# Patient Record
Sex: MALE | Race: WHITE | Employment: OTHER | URBAN - METROPOLITAN AREA
[De-identification: names, ages, dates, MRNs, and addresses within clinical notes are randomized per-mention and may not be internally consistent; named-entity substitution may affect disease eponyms.]

---

## 2020-08-03 ENCOUNTER — HOSPITAL ENCOUNTER (OUTPATIENT)
Dept: RADIATION ONCOLOGY | Age: 80
Discharge: HOME | End: 2020-08-03
Payer: OTHER GOVERNMENT

## 2020-08-03 VITALS
HEART RATE: 84 BPM | OXYGEN SATURATION: 91 % | DIASTOLIC BLOOD PRESSURE: 68 MMHG | RESPIRATION RATE: 18 BRPM | BODY MASS INDEX: 24.58 KG/M2 | TEMPERATURE: 98.3 F | SYSTOLIC BLOOD PRESSURE: 148 MMHG | WEIGHT: 162.2 LBS | HEIGHT: 68 IN

## 2020-08-03 PROCEDURE — 99204 OFFICE O/P NEW MOD 45 MIN: CPT | Performed by: RADIOLOGY

## 2020-08-03 PROCEDURE — 99202 OFFICE O/P NEW SF 15 MIN: CPT | Performed by: RADIOLOGY

## 2020-08-03 RX ORDER — LOSARTAN POTASSIUM 100 MG/1
100 TABLET ORAL DAILY
Status: ON HOLD | COMMUNITY
End: 2020-10-01 | Stop reason: HOSPADM

## 2020-08-03 RX ORDER — LIDOCAINE 50 MG/G
1 PATCH TOPICAL DAILY
COMMUNITY

## 2020-08-03 RX ORDER — SILDENAFIL 100 MG/1
100 TABLET, FILM COATED ORAL PRN
Status: ON HOLD | COMMUNITY
End: 2020-10-01 | Stop reason: HOSPADM

## 2020-08-03 RX ORDER — SPIRONOLACTONE 25 MG/1
25 TABLET ORAL DAILY
Status: ON HOLD | COMMUNITY
End: 2020-10-01 | Stop reason: HOSPADM

## 2020-08-03 RX ORDER — PRAVASTATIN SODIUM 40 MG
20 TABLET ORAL DAILY
Status: ON HOLD | COMMUNITY
End: 2020-10-01 | Stop reason: HOSPADM

## 2020-08-03 RX ORDER — AMLODIPINE BESYLATE 10 MG/1
10 TABLET ORAL DAILY
Status: ON HOLD | COMMUNITY
End: 2020-10-01 | Stop reason: HOSPADM

## 2020-08-03 RX ORDER — CARVEDILOL 6.25 MG/1
6.25 TABLET ORAL 2 TIMES DAILY WITH MEALS
Status: ON HOLD | COMMUNITY
End: 2020-10-01 | Stop reason: HOSPADM

## 2020-08-03 RX ORDER — METHOCARBAMOL 500 MG/1
1000 TABLET, FILM COATED ORAL 3 TIMES DAILY
Status: ON HOLD | COMMUNITY
End: 2020-10-01 | Stop reason: HOSPADM

## 2020-08-03 NOTE — PROGRESS NOTES
Grady Tien  8/3/2020    Chaperone: No    Advanced Directives     Power of [employer] Will    Not on File Not on File      Patient states he has [address]     Temp Readings from Last 2 Encounters:   08/03/20 98.3 °F (36.8 °C) (Infrared)     BP Readings from Last 2 Encounters:   08/03/20 (!) 148/68     Pulse Readings from Last 2 Encounters:   08/03/20 84        Wt Readings from Last 3 Encounters:   08/03/20 162 lb 3.2 oz (73.6 kg)         No results found for: CREATININE  No results found for: BUN    Mediport: no    Pacemaker/ICD: no    Previous XRT: no    Past Medical History:   Diagnosis Date    Arthritis     CAD (coronary artery disease)     [facility] [address] [facility]     COPD [facility]chronic obstructive [facility] disease) [facility] [address] [facility]     Hyperlipidemia     Hypertension      Past Surgical History:   Procedure Laterality Date    EYE SURGERY      Cataracts removed    JOINT REPLACEMENT      Hip    LUNG BIOPSY      VASCULAR SURGERY      Stents in leg       No Known Allergies       Current Outpatient Medications:     amLODIPine (NORVASC) 10 MG tablet, Take 10 mg by mouth daily, Disp: , Rfl:     apixaban (ELIQUIS) 5 MG TABS tablet, Take by mouth 2 times daily, Disp: , Rfl:     carvedilol (COREG) 6.25 MG tablet, Take 6.25 mg by mouth 2 times daily (with meals), Disp: , Rfl:     diclofenac sodium (VOLTAREN) 1 % GEL, Apply 4 g topically 4 times daily as needed for Pain, Disp: , Rfl:     lidocaine (LIDODERM) 5 %, Place 1 patch onto the skin daily 12 hours on, 12 hours off., Disp: , Rfl:     losartan (COZAAR) 100 MG tablet, Take 100 mg by mouth daily, Disp: , Rfl:     methocarbamol (ROBAXIN) 500 MG tablet, Take 1,000 mg by mouth 3 times daily, Disp: , Rfl:     Multiple Vitamins-Minerals (ICAPS AREDS 2 PO), Take 1 capsule by mouth 2 times daily, Disp: , Rfl:     pravastatin (PRAVACHOL) 40 MG tablet, Take 20 mg by mouth daily, Disp: , Rfl:     sildenafil (VIAGRA) 100 MG tablet, Take 100 mg by mouth as needed for Erectile Dysfunction, Disp: , Rfl:     spironolactone (ALDACTONE) 25 MG tablet, Take 25 mg by mouth daily, Disp: , Rfl:       ADDITIONAL COMMENTS: Patient here for consult.       Que Ramirez BSN, RN

## 2020-08-03 NOTE — PROGRESS NOTES
adjacent to the major fissure. However, there is mild increased uptake in the right upper lung posteriorly laterally though with a relatively low SUV of 1.1. There is mild uptake in a pretracheal lymph node and in the right hilum of 1.9 SUV. Biopsy was recommended, and CT guided biopsy was performed in July 2020. The more superior lesion returned positive for non-small cell carcinoma, favoring adenocarcinoma. The lower lesion showed chronic inflammation and fibrosis. Theresa José is considered a poor surgical candidate. He is being seen today 8/3/2024 evaluation and discussion regarding the role of stereotactic ablative radiation therapy in the management of his biopsy-proven right upper lung cancer. Past Medical History:   Diagnosis Date    Arthritis     CAD (coronary artery disease)     [facility] [address]     COPD (chronic obstructive pulmonary disease) ([facility] [address] [facility]     Hyperlipidemia     Hypertension         Past Surgical History:   Procedure Laterality Date    EYE SURGERY      Cataracts removed    JOINT REPLACEMENT      Hip    LUNG BIOPSY      VASCULAR SURGERY      Stents in leg       Family History   Problem Relation Age of Onset    Liver Disease Mother        Social History     Socioeconomic History    Marital status: Single     Spouse name: Not on file    Number of children: 0    Years of education: Not on file    Highest education level: Not on file   Occupational History    Not on file   Social Needs    Financial resource strain: Not on file    Food insecurity     Worry: Not on file     Inability: Not on file   [employer] needs     Medical: Not on file     Non-medical: Not on file   Tobacco Use    Smoking status: Current Every Day Smoker     Packs/day: 0.50     Years: 68.00     Pack years: 34.00     Types: Cigarettes     Start date: 1/1/1950    Smokeless tobacco: Never Used   Substance and Sexual Activity    Alcohol use:  Yes     Alcohol/week: 3.0 standard drinks     Types: 3 Cans of beer per week    Drug use: Never    Sexual activity: Not on file   Lifestyle    Physical activity     Days per week: Not on file     Minutes per session: Not on file    Stress: Not on file   Relationships    Social connections     Talks on phone: Not on file     Gets together: Not on file     Attends [employer] service: Not on file     Active member of club or organization: Not on file     Attends meetings of clubs or organizations: Not on file     Relationship status: Not on file    Intimate partner violence     Fear of current or ex partner: Not on file     Emotionally abused: Not on file     Physically abused: Not on file     Forced sexual activity: Not on file   Other Topics Concern    Not on file   Social History Narrative    Not on file     Exposure to Industrial/ environmental Carcinogens: uncertain: Cannot rule out toxic exposure during [employer]. ALLERGIES: No Known Allergies       Current Outpatient Medications   Medication Sig Dispense Refill    amLODIPine (NORVASC) 10 MG tablet Take 10 mg by mouth daily      apixaban (ELIQUIS) 5 MG TABS tablet Take by mouth 2 times daily      carvedilol (COREG) 6.25 MG tablet Take 6.25 mg by mouth 2 times daily (with meals)      diclofenac sodium (VOLTAREN) 1 % GEL Apply 4 g topically 4 times daily as needed for Pain      lidocaine (LIDODERM) 5 % Place 1 patch onto the skin daily 12 hours on, 12 hours off.  losartan (COZAAR) 100 MG tablet Take 100 mg by mouth daily      methocarbamol (ROBAXIN) 500 MG tablet Take 1,000 mg by mouth 3 times daily      Multiple Vitamins-Minerals (ICAPS AREDS 2 PO) Take 1 capsule by mouth 2 times daily      pravastatin (PRAVACHOL) 40 MG tablet Take 20 mg by mouth daily      sildenafil (VIAGRA) 100 MG tablet Take 100 mg by mouth as needed for Erectile Dysfunction      spironolactone (ALDACTONE) 25 MG tablet Take 25 mg by mouth daily       No current facility-administered medications for this encounter.       No outpatient medications have been marked as taking for the 8/3/20 encounter [facility] Encounter) with Yung Shell MD.          LABORATORY STUDIES:   No current for review       PATHOLOGY:   7/9/2020: Surgical pathology:  A. Right upper lobe of lung, apical, biopsy:  Non-small cell lung carcinoma, favor adenocarcinoma, poorly differentiated. COMMENT: For imaging of stains are obtained with appropriate controls. The tumor cells staining with TTF-1 and CK 7. Focally rare tumor cells stained with an. Tumor cells do not stain with P 40. Results are discussed with Dr. Javi FALL. Interdepartmental consultation is obtained. B.  Right mid lobe of lung, mass, biopsy:  Chronic inflammation and fibrosis. RADIOLOGIC STUDIES:   As described in history of present illness. 6/4/2020: CT chest lung nodule follow-up; apical nodule, biopsy proven non-small cell lung carcinoma. 6/4/2020: CT chest lung nodule follow-up; right mid lung nodule, biopsy showed chronic inflammation without malignancy. REVIEW OF SYSTEMS:    Constitutional: Denies fever, chills, unintentional weight change. H EENT: Denies new hearing or vision change. Denies dysphagia or odynophagia. Respiratory: Chronic dyspnea on exertion. Denies hemoptysis, coughing, wheezing or sputum production. Cardiovascular: Denies chest pain, palpitations or syncope. GI: Denies nausea, vomiting, hematemesis, rectal bleeding or change in bowel habits. : Some urinary hesitancy. Denies dysuria or hematuria. Musculoskeletal: DJD. Extremities: Complains of swelling of ankles. Metabolic/endocrine: Denies complaints. Neurological: Denies seizures, fainting or tremors. Integument: Denies rashes or ulcerations. PHYSICAL EXAMINATION:   VITAL SIGNS: Height: 5 feet 9 inches. Weight: 162 pounds 3.2 ounces. Temperature: 36.8°C.  Pulse: 84.  Respirations: 18.  Blood pressure: 148/68. Pulse oximetry: O2 saturation 91% on room air.   ECOG department for each treatment session. We then discussed the expected and potential short and long-term side effects and risks of the treatment. Short-term side effects mainly are limited to discomfort related to the treatment position and treatment table. Patients generally do not have acute side effects during the course of treatment. Long-term side effects could include costochondritis and/or rib fracture. We reviewed the expected development of intense inflammatory response and dense fibrosis in the treatment area after completion of therapy. I explained that these posttreatment findings are difficult for radiologists to interpret, and they often will report a possible interval progression because the area of density becomes larger due to the posttreatment response. It will be necessary to follow with serial CT scans to monitor the evolving appearance after completion of treatment. 4. Candance Harold had the opportunity to ask questions, and indicated that his questions were satisfactorily addressed. He also indicated that he understood the discussion, and wishes to proceed with the recommended treatment. PLAN:  1. Schedule CT simulation for stereotactic ablative radiation therapy with custom stereotactic body frame and 4-dimensional planning for respiratory motion management. 2. Schedule nursing teaching. 3. Initiate radiation therapy after completion of treatment planning. 4. Continue care with other physicians/providers. 5. Continue surveillance and basic/preventive/supportive health care in accordance with clinical practice guidelines. Thank you for allowing my assistance in [address] [place] [address]. Mayra Verdin MD    Radiation Oncology       ATTESTATION: 90 minutes spent actively reviewing patient data; 60 minutes face-to-face time,  >50% spent in counseling/discussion/education.     CC: Lenin Coburn; [id number] [facility], [address] [place] [address]: Tumor Registry: [address] [place] [address] Tramaine Sanz      This document was created using a voice-recognition program.  Computer generated transcription errors may be present.

## 2020-08-04 ENCOUNTER — HOSPITAL ENCOUNTER (OUTPATIENT)
Dept: CT IMAGING | Age: 80
Discharge: HOME | End: 2020-08-04
Payer: OTHER GOVERNMENT

## 2020-08-04 ENCOUNTER — HOSPITAL ENCOUNTER (OUTPATIENT)
Dept: RADIATION ONCOLOGY | Age: 80
Discharge: HOME | End: 2020-08-04
Attending: RADIOLOGY
Payer: OTHER GOVERNMENT

## 2020-08-04 PROCEDURE — 77334 RADIATION TREATMENT AID(S): CPT | Performed by: RADIOLOGY

## 2020-08-04 PROCEDURE — 77470 SPECIAL RADIATION TREATMENT: CPT | Performed by: RADIOLOGY

## 2020-08-04 PROCEDURE — 77332 RADIATION TREATMENT AID(S): CPT | Performed by: RADIOLOGY

## 2020-08-04 PROCEDURE — 3209999900 CT GUIDE RADIATION THERAPY NO CHARGE

## 2020-08-04 PROCEDURE — 77263 THER RADIOLOGY TX PLNG CPLX: CPT | Performed by: RADIOLOGY

## 2020-08-04 PROCEDURE — 77290 THER RAD SIMULAJ FIELD CPLX: CPT | Performed by: RADIOLOGY

## 2020-08-25 ENCOUNTER — HOSPITAL ENCOUNTER (OUTPATIENT)
Dept: GENERAL RADIOLOGY | Age: 80
Discharge: HOME | End: 2020-08-25
Payer: OTHER GOVERNMENT

## 2020-08-25 ENCOUNTER — HOSPITAL ENCOUNTER (OUTPATIENT)
Dept: ULTRASOUND IMAGING | Age: 80
Discharge: HOME | End: 2020-08-25
Payer: OTHER GOVERNMENT

## 2020-08-25 PROCEDURE — 32555 ASPIRATE PLEURA W/ IMAGING: CPT

## 2020-08-25 PROCEDURE — 71045 X-RAY EXAM CHEST 1 VIEW: CPT

## 2020-08-28 ENCOUNTER — HOSPITAL ENCOUNTER (OUTPATIENT)
Dept: RADIATION ONCOLOGY | Age: 80
Discharge: HOME | End: 2020-08-28
Attending: RADIOLOGY
Payer: OTHER GOVERNMENT

## 2020-08-28 ENCOUNTER — HOSPITAL ENCOUNTER (OUTPATIENT)
Dept: CT IMAGING | Age: 80
Discharge: HOME | End: 2020-08-28
Payer: OTHER GOVERNMENT

## 2020-08-28 PROCEDURE — 76380 CAT SCAN FOLLOW-UP STUDY: CPT | Performed by: RADIOLOGY

## 2020-08-28 PROCEDURE — 3209999900 CT GUIDE RADIATION THERAPY NO CHARGE

## 2020-09-10 PROCEDURE — 77300 RADIATION THERAPY DOSE PLAN: CPT | Performed by: RADIOLOGY

## 2020-09-10 PROCEDURE — 77293 RESPIRATOR MOTION MGMT SIMUL: CPT | Performed by: RADIOLOGY

## 2020-09-10 PROCEDURE — 77338 DESIGN MLC DEVICE FOR IMRT: CPT | Performed by: RADIOLOGY

## 2020-09-10 PROCEDURE — 77301 RADIOTHERAPY DOSE PLAN IMRT: CPT | Performed by: RADIOLOGY

## 2020-09-14 ENCOUNTER — APPOINTMENT (OUTPATIENT)
Dept: RADIATION ONCOLOGY | Age: 80
End: 2020-09-14
Attending: RADIOLOGY
Payer: OTHER GOVERNMENT

## 2020-09-15 ENCOUNTER — HOSPITAL ENCOUNTER (OUTPATIENT)
Dept: RADIATION ONCOLOGY | Age: 80
End: 2020-09-15
Attending: RADIOLOGY
Payer: OTHER GOVERNMENT

## 2020-09-16 ENCOUNTER — APPOINTMENT (OUTPATIENT)
Dept: GENERAL RADIOLOGY | Age: 80
DRG: 208 | End: 2020-09-16
Payer: OTHER GOVERNMENT

## 2020-09-16 ENCOUNTER — APPOINTMENT (OUTPATIENT)
Dept: CT IMAGING | Age: 80
DRG: 208 | End: 2020-09-16
Payer: OTHER GOVERNMENT

## 2020-09-16 ENCOUNTER — HOSPITAL ENCOUNTER (INPATIENT)
Age: 80
LOS: 15 days | Discharge: HOSPICE | DRG: 208 | End: 2020-10-01
Attending: EMERGENCY MEDICINE | Admitting: INTERNAL MEDICINE
Payer: OTHER GOVERNMENT

## 2020-09-16 PROBLEM — J90 PLEURAL EFFUSION: Status: ACTIVE | Noted: 2020-09-16

## 2020-09-16 LAB
ALBUMIN SERPL-MCNC: 3.6 G/DL (ref 3.5–5.1)
ALP BLD-CCNC: 201 U/L (ref 38–126)
ALT SERPL-CCNC: 150 U/L (ref 11–66)
AMPHETAMINE+METHAMPHETAMINE URINE SCREEN: NEGATIVE
ANION GAP SERPL CALCULATED.3IONS-SCNC: 13 MEQ/L (ref 8–16)
APTT: 28.6 SECONDS (ref 22–38)
AST SERPL-CCNC: 236 U/L (ref 5–40)
BACTERIA: ABNORMAL /HPF
BARBITURATE QUANTITATIVE URINE: NEGATIVE
BASOPHILS # BLD: 0 %
BASOPHILS ABSOLUTE: 0 THOU/MM3 (ref 0–0.1)
BENZODIAZEPINE QUANTITATIVE URINE: NEGATIVE
BILIRUB SERPL-MCNC: 1.1 MG/DL (ref 0.3–1.2)
BILIRUBIN DIRECT: 0.7 MG/DL (ref 0–0.3)
BILIRUBIN URINE: NEGATIVE
BLOOD, URINE: NEGATIVE
BUN BLDV-MCNC: 30 MG/DL (ref 7–22)
CALCIUM SERPL-MCNC: 9.3 MG/DL (ref 8.5–10.5)
CANNABINOID QUANTITATIVE URINE: NEGATIVE
CASTS 2: ABNORMAL /LPF
CASTS UA: ABNORMAL /LPF
CHARACTER, URINE: CLEAR
CHLORIDE BLD-SCNC: 100 MEQ/L (ref 98–111)
CO2: 28 MEQ/L (ref 23–33)
COCAINE METABOLITE QUANTITATIVE URINE: NEGATIVE
COLOR: ABNORMAL
CREAT SERPL-MCNC: 1.7 MG/DL (ref 0.4–1.2)
CRYSTALS, UA: ABNORMAL
EOSINOPHIL # BLD: 0 %
EOSINOPHILS ABSOLUTE: 0 THOU/MM3 (ref 0–0.4)
EPITHELIAL CELLS, UA: ABNORMAL /HPF
ERYTHROCYTE [DISTWIDTH] IN BLOOD BY AUTOMATED COUNT: 14.6 % (ref 11.5–14.5)
ERYTHROCYTE [DISTWIDTH] IN BLOOD BY AUTOMATED COUNT: 57.5 FL (ref 35–45)
ETHYL ALCOHOL, SERUM: < 0.01 %
GFR SERPL CREATININE-BSD FRML MDRD: 39 ML/MIN/1.73M2
GLUCOSE BLD-MCNC: 126 MG/DL (ref 70–108)
GLUCOSE URINE: NEGATIVE MG/DL
HCT VFR BLD CALC: 38.8 % (ref 42–52)
HEMOGLOBIN: 11.5 GM/DL (ref 14–18)
IMMATURE GRANS (ABS): 0.04 THOU/MM3 (ref 0–0.07)
IMMATURE GRANULOCYTES: 0.6 %
INR BLD: 1.22 (ref 0.85–1.13)
KETONES, URINE: NEGATIVE
LEUKOCYTE ESTERASE, URINE: NEGATIVE
LIPASE: 11.7 U/L (ref 5.6–51.3)
LYMPHOCYTES # BLD: 7.4 %
LYMPHOCYTES ABSOLUTE: 0.5 THOU/MM3 (ref 1–4.8)
MAGNESIUM: 2.1 MG/DL (ref 1.6–2.4)
MCH RBC QN AUTO: 31.6 PG (ref 26–33)
MCHC RBC AUTO-ENTMCNC: 29.6 GM/DL (ref 32.2–35.5)
MCV RBC AUTO: 106.6 FL (ref 80–94)
MISCELLANEOUS 2: ABNORMAL
MONOCYTES # BLD: 11.4 %
MONOCYTES ABSOLUTE: 0.8 THOU/MM3 (ref 0.4–1.3)
NITRITE, URINE: NEGATIVE
NUCLEATED RED BLOOD CELLS: 0 /100 WBC
OPIATES, URINE: NEGATIVE
OSMOLALITY CALCULATION: 289 MOSMOL/KG (ref 275–300)
OXYCODONE: NEGATIVE
PH UA: 5 (ref 5–9)
PHENCYCLIDINE QUANTITATIVE URINE: NEGATIVE
PLATELET # BLD: 262 THOU/MM3 (ref 130–400)
PMV BLD AUTO: 10.1 FL (ref 9.4–12.4)
POTASSIUM SERPL-SCNC: 4.9 MEQ/L (ref 3.5–5.2)
PROTEIN UA: 30
RBC # BLD: 3.64 MILL/MM3 (ref 4.7–6.1)
RBC URINE: ABNORMAL /HPF
RENAL EPITHELIAL, UA: ABNORMAL
SARS-COV-2, NAAT: NOT DETECTED
SEG NEUTROPHILS: 80.6 %
SEGMENTED NEUTROPHILS ABSOLUTE COUNT: 5.5 THOU/MM3 (ref 1.8–7.7)
SODIUM BLD-SCNC: 141 MEQ/L (ref 135–145)
SPECIFIC GRAVITY, URINE: 1.02 (ref 1–1.03)
TOTAL PROTEIN: 6.5 G/DL (ref 6.1–8)
TROPONIN T: 0.04 NG/ML
TSH SERPL DL<=0.05 MIU/L-ACNC: 0.84 UIU/ML (ref 0.4–4.2)
UROBILINOGEN, URINE: 1 EU/DL (ref 0–1)
WBC # BLD: 6.8 THOU/MM3 (ref 4.8–10.8)
WBC UA: ABNORMAL /HPF
YEAST: ABNORMAL

## 2020-09-16 PROCEDURE — 71045 X-RAY EXAM CHEST 1 VIEW: CPT

## 2020-09-16 PROCEDURE — 80307 DRUG TEST PRSMV CHEM ANLYZR: CPT

## 2020-09-16 PROCEDURE — 1200000003 HC TELEMETRY R&B

## 2020-09-16 PROCEDURE — 82248 BILIRUBIN DIRECT: CPT

## 2020-09-16 PROCEDURE — 83735 ASSAY OF MAGNESIUM: CPT

## 2020-09-16 PROCEDURE — 85610 PROTHROMBIN TIME: CPT

## 2020-09-16 PROCEDURE — 99285 EMERGENCY DEPT VISIT HI MDM: CPT

## 2020-09-16 PROCEDURE — 80053 COMPREHEN METABOLIC PANEL: CPT

## 2020-09-16 PROCEDURE — 84443 ASSAY THYROID STIM HORMONE: CPT

## 2020-09-16 PROCEDURE — U0002 COVID-19 LAB TEST NON-CDC: HCPCS

## 2020-09-16 PROCEDURE — 71250 CT THORAX DX C-: CPT

## 2020-09-16 PROCEDURE — 83690 ASSAY OF LIPASE: CPT

## 2020-09-16 PROCEDURE — 99284 EMERGENCY DEPT VISIT MOD MDM: CPT

## 2020-09-16 PROCEDURE — 36415 COLL VENOUS BLD VENIPUNCTURE: CPT

## 2020-09-16 PROCEDURE — 84484 ASSAY OF TROPONIN QUANT: CPT

## 2020-09-16 PROCEDURE — 85730 THROMBOPLASTIN TIME PARTIAL: CPT

## 2020-09-16 PROCEDURE — 81001 URINALYSIS AUTO W/SCOPE: CPT

## 2020-09-16 PROCEDURE — G0480 DRUG TEST DEF 1-7 CLASSES: HCPCS

## 2020-09-16 PROCEDURE — 74176 CT ABD & PELVIS W/O CONTRAST: CPT

## 2020-09-16 PROCEDURE — 93005 ELECTROCARDIOGRAM TRACING: CPT | Performed by: EMERGENCY MEDICINE

## 2020-09-16 PROCEDURE — 85025 COMPLETE CBC W/AUTO DIFF WBC: CPT

## 2020-09-16 RX ORDER — LIDOCAINE 4 G/G
1 PATCH TOPICAL DAILY
Status: DISCONTINUED | OUTPATIENT
Start: 2020-09-17 | End: 2020-10-01 | Stop reason: HOSPADM

## 2020-09-16 RX ORDER — AMLODIPINE BESYLATE 10 MG/1
10 TABLET ORAL DAILY
Status: DISCONTINUED | OUTPATIENT
Start: 2020-09-17 | End: 2020-09-19

## 2020-09-16 RX ORDER — LOSARTAN POTASSIUM 100 MG/1
100 TABLET ORAL DAILY
Status: DISCONTINUED | OUTPATIENT
Start: 2020-09-17 | End: 2020-09-24

## 2020-09-16 RX ORDER — METHOCARBAMOL 500 MG/1
1000 TABLET, FILM COATED ORAL 3 TIMES DAILY
Status: DISCONTINUED | OUTPATIENT
Start: 2020-09-17 | End: 2020-09-19

## 2020-09-16 RX ORDER — SPIRONOLACTONE 25 MG/1
25 TABLET ORAL DAILY
Status: DISCONTINUED | OUTPATIENT
Start: 2020-09-17 | End: 2020-09-19

## 2020-09-16 RX ORDER — PRAVASTATIN SODIUM 20 MG
20 TABLET ORAL DAILY
Status: DISCONTINUED | OUTPATIENT
Start: 2020-09-17 | End: 2020-09-24

## 2020-09-16 RX ORDER — CARVEDILOL 6.25 MG/1
6.25 TABLET ORAL 2 TIMES DAILY WITH MEALS
Status: DISCONTINUED | OUTPATIENT
Start: 2020-09-17 | End: 2020-09-19

## 2020-09-17 ENCOUNTER — APPOINTMENT (OUTPATIENT)
Dept: GENERAL RADIOLOGY | Age: 80
DRG: 208 | End: 2020-09-17
Payer: OTHER GOVERNMENT

## 2020-09-17 ENCOUNTER — APPOINTMENT (OUTPATIENT)
Dept: ULTRASOUND IMAGING | Age: 80
DRG: 208 | End: 2020-09-17
Payer: OTHER GOVERNMENT

## 2020-09-17 ENCOUNTER — APPOINTMENT (OUTPATIENT)
Dept: INTERVENTIONAL RADIOLOGY/VASCULAR | Age: 80
DRG: 208 | End: 2020-09-17
Payer: OTHER GOVERNMENT

## 2020-09-17 LAB
ALBUMIN SERPL-MCNC: 3.2 G/DL (ref 3.5–5.1)
ALP BLD-CCNC: 183 U/L (ref 38–126)
ALT SERPL-CCNC: 139 U/L (ref 11–66)
ANION GAP SERPL CALCULATED.3IONS-SCNC: 12 MEQ/L (ref 8–16)
AST SERPL-CCNC: 202 U/L (ref 5–40)
BASE EXCESS (CALCULATED): 6.6 MMOL/L (ref -2.5–2.5)
BILIRUB SERPL-MCNC: 0.9 MG/DL (ref 0.3–1.2)
BILIRUBIN DIRECT: 0.5 MG/DL (ref 0–0.3)
BUN BLDV-MCNC: 31 MG/DL (ref 7–22)
CALCIUM SERPL-MCNC: 9 MG/DL (ref 8.5–10.5)
CHLORIDE BLD-SCNC: 102 MEQ/L (ref 98–111)
CO2: 28 MEQ/L (ref 23–33)
COLLECTED BY:: ABNORMAL
CREAT SERPL-MCNC: 1.5 MG/DL (ref 0.4–1.2)
DEVICE: ABNORMAL
EKG ATRIAL RATE: 76 BPM
EKG ATRIAL RATE: 87 BPM
EKG ATRIAL RATE: 92 BPM
EKG P AXIS: -22 DEGREES
EKG P AXIS: 25 DEGREES
EKG P AXIS: 34 DEGREES
EKG P-R INTERVAL: 102 MS
EKG P-R INTERVAL: 148 MS
EKG P-R INTERVAL: 154 MS
EKG Q-T INTERVAL: 348 MS
EKG Q-T INTERVAL: 350 MS
EKG Q-T INTERVAL: 360 MS
EKG QRS DURATION: 86 MS
EKG QRS DURATION: 90 MS
EKG QRS DURATION: 92 MS
EKG QTC CALCULATION (BAZETT): 405 MS
EKG QTC CALCULATION (BAZETT): 418 MS
EKG QTC CALCULATION (BAZETT): 432 MS
EKG R AXIS: -36 DEGREES
EKG R AXIS: -56 DEGREES
EKG R AXIS: -57 DEGREES
EKG T AXIS: -44 DEGREES
EKG T AXIS: 148 DEGREES
EKG T AXIS: 168 DEGREES
EKG VENTRICULAR RATE: 76 BPM
EKG VENTRICULAR RATE: 87 BPM
EKG VENTRICULAR RATE: 92 BPM
ERYTHROCYTE [DISTWIDTH] IN BLOOD BY AUTOMATED COUNT: 14.6 % (ref 11.5–14.5)
ERYTHROCYTE [DISTWIDTH] IN BLOOD BY AUTOMATED COUNT: 56.4 FL (ref 35–45)
GFR SERPL CREATININE-BSD FRML MDRD: 45 ML/MIN/1.73M2
GLUCOSE BLD-MCNC: 96 MG/DL (ref 70–108)
GLUCOSE, FLUID: 87 MG/DL
HCO3: 33 MMOL/L (ref 23–28)
HCT VFR BLD CALC: 35.3 % (ref 42–52)
HEMOGLOBIN: 10.7 GM/DL (ref 14–18)
IFIO2: 6
MCH RBC QN AUTO: 31.8 PG (ref 26–33)
MCHC RBC AUTO-ENTMCNC: 30.3 GM/DL (ref 32.2–35.5)
MCV RBC AUTO: 104.7 FL (ref 80–94)
O2 SATURATION: 82 %
OSMOLALITY CALCULATION: 289.5 MOSMOL/KG (ref 275–300)
PCO2: 55 MMHG (ref 35–45)
PH BLOOD GAS: 7.39 (ref 7.35–7.45)
PLATELET # BLD: 215 THOU/MM3 (ref 130–400)
PMV BLD AUTO: 10.2 FL (ref 9.4–12.4)
PO2: 48 MMHG (ref 71–104)
POTASSIUM REFLEX MAGNESIUM: 4.6 MEQ/L (ref 3.5–5.2)
PROTEIN FLUID: 1.2 GM/DL
RBC # BLD: 3.37 MILL/MM3 (ref 4.7–6.1)
SODIUM BLD-SCNC: 142 MEQ/L (ref 135–145)
SOURCE, BLOOD GAS: ABNORMAL
TOTAL PROTEIN: 5.9 G/DL (ref 6.1–8)
TROPONIN T: 0.03 NG/ML
TROPONIN T: 0.04 NG/ML
TROPONIN T: 0.04 NG/ML
VANCOMYCIN RESISTANT ENTEROCOCCUS: NEGATIVE
WBC # BLD: 6.3 THOU/MM3 (ref 4.8–10.8)

## 2020-09-17 PROCEDURE — 84484 ASSAY OF TROPONIN QUANT: CPT

## 2020-09-17 PROCEDURE — 6370000000 HC RX 637 (ALT 250 FOR IP): Performed by: STUDENT IN AN ORGANIZED HEALTH CARE EDUCATION/TRAINING PROGRAM

## 2020-09-17 PROCEDURE — 80048 BASIC METABOLIC PNL TOTAL CA: CPT

## 2020-09-17 PROCEDURE — 0W993ZZ DRAINAGE OF RIGHT PLEURAL CAVITY, PERCUTANEOUS APPROACH: ICD-10-PCS | Performed by: RADIOLOGY

## 2020-09-17 PROCEDURE — 32555 ASPIRATE PLEURA W/ IMAGING: CPT

## 2020-09-17 PROCEDURE — 93010 ELECTROCARDIOGRAM REPORT: CPT | Performed by: NUCLEAR MEDICINE

## 2020-09-17 PROCEDURE — 82803 BLOOD GASES ANY COMBINATION: CPT

## 2020-09-17 PROCEDURE — 2060000000 HC ICU INTERMEDIATE R&B

## 2020-09-17 PROCEDURE — 84157 ASSAY OF PROTEIN OTHER: CPT

## 2020-09-17 PROCEDURE — 87500 VANOMYCIN DNA AMP PROBE: CPT

## 2020-09-17 PROCEDURE — 36415 COLL VENOUS BLD VENIPUNCTURE: CPT

## 2020-09-17 PROCEDURE — 2580000003 HC RX 258: Performed by: STUDENT IN AN ORGANIZED HEALTH CARE EDUCATION/TRAINING PROGRAM

## 2020-09-17 PROCEDURE — 88305 TISSUE EXAM BY PATHOLOGIST: CPT

## 2020-09-17 PROCEDURE — 89050 BODY FLUID CELL COUNT: CPT

## 2020-09-17 PROCEDURE — 85027 COMPLETE CBC AUTOMATED: CPT

## 2020-09-17 PROCEDURE — 93005 ELECTROCARDIOGRAM TRACING: CPT | Performed by: STUDENT IN AN ORGANIZED HEALTH CARE EDUCATION/TRAINING PROGRAM

## 2020-09-17 PROCEDURE — 87075 CULTR BACTERIA EXCEPT BLOOD: CPT

## 2020-09-17 PROCEDURE — 80076 HEPATIC FUNCTION PANEL: CPT

## 2020-09-17 PROCEDURE — 87081 CULTURE SCREEN ONLY: CPT

## 2020-09-17 PROCEDURE — 87205 SMEAR GRAM STAIN: CPT

## 2020-09-17 PROCEDURE — 94760 N-INVAS EAR/PLS OXIMETRY 1: CPT

## 2020-09-17 PROCEDURE — 87641 MR-STAPH DNA AMP PROBE: CPT

## 2020-09-17 PROCEDURE — 6360000002 HC RX W HCPCS: Performed by: STUDENT IN AN ORGANIZED HEALTH CARE EDUCATION/TRAINING PROGRAM

## 2020-09-17 PROCEDURE — 71045 X-RAY EXAM CHEST 1 VIEW: CPT

## 2020-09-17 PROCEDURE — 82945 GLUCOSE OTHER FLUID: CPT

## 2020-09-17 PROCEDURE — 99233 SBSQ HOSP IP/OBS HIGH 50: CPT | Performed by: FAMILY MEDICINE

## 2020-09-17 PROCEDURE — 87070 CULTURE OTHR SPECIMN AEROBIC: CPT

## 2020-09-17 PROCEDURE — 94640 AIRWAY INHALATION TREATMENT: CPT

## 2020-09-17 PROCEDURE — 88112 CYTOPATH CELL ENHANCE TECH: CPT

## 2020-09-17 PROCEDURE — 99222 1ST HOSP IP/OBS MODERATE 55: CPT | Performed by: FAMILY MEDICINE

## 2020-09-17 RX ORDER — ACETAMINOPHEN 650 MG/1
650 SUPPOSITORY RECTAL EVERY 6 HOURS PRN
Status: DISCONTINUED | OUTPATIENT
Start: 2020-09-16 | End: 2020-10-01 | Stop reason: HOSPADM

## 2020-09-17 RX ORDER — POLYETHYLENE GLYCOL 3350 17 G/17G
17 POWDER, FOR SOLUTION ORAL DAILY PRN
Status: DISCONTINUED | OUTPATIENT
Start: 2020-09-16 | End: 2020-10-01 | Stop reason: HOSPADM

## 2020-09-17 RX ORDER — ACETAMINOPHEN 325 MG/1
650 TABLET ORAL EVERY 6 HOURS PRN
Status: DISCONTINUED | OUTPATIENT
Start: 2020-09-16 | End: 2020-10-01 | Stop reason: HOSPADM

## 2020-09-17 RX ORDER — SODIUM CHLORIDE 0.9 % (FLUSH) 0.9 %
10 SYRINGE (ML) INJECTION EVERY 12 HOURS SCHEDULED
Status: DISCONTINUED | OUTPATIENT
Start: 2020-09-17 | End: 2020-09-24

## 2020-09-17 RX ORDER — SODIUM CHLORIDE 9 MG/ML
INJECTION, SOLUTION INTRAVENOUS CONTINUOUS
Status: DISCONTINUED | OUTPATIENT
Start: 2020-09-17 | End: 2020-09-17

## 2020-09-17 RX ORDER — PROMETHAZINE HYDROCHLORIDE 25 MG/1
12.5 TABLET ORAL EVERY 6 HOURS PRN
Status: DISCONTINUED | OUTPATIENT
Start: 2020-09-16 | End: 2020-10-01 | Stop reason: HOSPADM

## 2020-09-17 RX ORDER — SODIUM CHLORIDE 0.9 % (FLUSH) 0.9 %
10 SYRINGE (ML) INJECTION PRN
Status: DISCONTINUED | OUTPATIENT
Start: 2020-09-16 | End: 2020-09-24

## 2020-09-17 RX ORDER — FUROSEMIDE 10 MG/ML
40 INJECTION INTRAMUSCULAR; INTRAVENOUS ONCE
Status: COMPLETED | OUTPATIENT
Start: 2020-09-17 | End: 2020-09-17

## 2020-09-17 RX ORDER — FUROSEMIDE 10 MG/ML
20 INJECTION INTRAMUSCULAR; INTRAVENOUS ONCE
Status: COMPLETED | OUTPATIENT
Start: 2020-09-17 | End: 2020-09-17

## 2020-09-17 RX ORDER — ONDANSETRON 2 MG/ML
4 INJECTION INTRAMUSCULAR; INTRAVENOUS EVERY 6 HOURS PRN
Status: DISCONTINUED | OUTPATIENT
Start: 2020-09-16 | End: 2020-10-01 | Stop reason: HOSPADM

## 2020-09-17 RX ORDER — IPRATROPIUM BROMIDE AND ALBUTEROL SULFATE 2.5; .5 MG/3ML; MG/3ML
1 SOLUTION RESPIRATORY (INHALATION)
Status: DISCONTINUED | OUTPATIENT
Start: 2020-09-17 | End: 2020-09-19

## 2020-09-17 RX ADMIN — Medication 10 ML: at 11:17

## 2020-09-17 RX ADMIN — CARVEDILOL 6.25 MG: 6.25 TABLET, FILM COATED ORAL at 17:52

## 2020-09-17 RX ADMIN — CARVEDILOL 6.25 MG: 6.25 TABLET, FILM COATED ORAL at 11:17

## 2020-09-17 RX ADMIN — METHOCARBAMOL TABLETS 1000 MG: 500 TABLET, COATED ORAL at 11:17

## 2020-09-17 RX ADMIN — AMLODIPINE BESYLATE 10 MG: 10 TABLET ORAL at 11:17

## 2020-09-17 RX ADMIN — METHOCARBAMOL TABLETS 1000 MG: 500 TABLET, COATED ORAL at 22:29

## 2020-09-17 RX ADMIN — Medication 10 ML: at 22:30

## 2020-09-17 RX ADMIN — IPRATROPIUM BROMIDE AND ALBUTEROL SULFATE 1 AMPULE: .5; 3 SOLUTION RESPIRATORY (INHALATION) at 17:51

## 2020-09-17 RX ADMIN — IPRATROPIUM BROMIDE AND ALBUTEROL SULFATE 1 AMPULE: .5; 3 SOLUTION RESPIRATORY (INHALATION) at 12:06

## 2020-09-17 RX ADMIN — FUROSEMIDE 20 MG: 10 INJECTION, SOLUTION INTRAMUSCULAR; INTRAVENOUS at 05:03

## 2020-09-17 RX ADMIN — IPRATROPIUM BROMIDE AND ALBUTEROL SULFATE 1 AMPULE: .5; 3 SOLUTION RESPIRATORY (INHALATION) at 07:44

## 2020-09-17 RX ADMIN — METHOCARBAMOL TABLETS 1000 MG: 500 TABLET, COATED ORAL at 17:52

## 2020-09-17 RX ADMIN — FUROSEMIDE 40 MG: 10 INJECTION, SOLUTION INTRAMUSCULAR; INTRAVENOUS at 17:45

## 2020-09-17 RX ADMIN — SPIRONOLACTONE 25 MG: 25 TABLET ORAL at 11:17

## 2020-09-17 RX ADMIN — IPRATROPIUM BROMIDE AND ALBUTEROL SULFATE 1 AMPULE: .5; 3 SOLUTION RESPIRATORY (INHALATION) at 21:04

## 2020-09-17 ASSESSMENT — ENCOUNTER SYMPTOMS
EYE REDNESS: 0
CONSTIPATION: 0
SHORTNESS OF BREATH: 1
TROUBLE SWALLOWING: 0
EYE DISCHARGE: 0
SINUS PRESSURE: 0
COUGH: 1
ABDOMINAL DISTENTION: 0
VOMITING: 0
SORE THROAT: 0
PHOTOPHOBIA: 0
CHOKING: 0
BLOOD IN STOOL: 0
RHINORRHEA: 0
DIARRHEA: 0
NAUSEA: 0
EYE PAIN: 0
ABDOMINAL PAIN: 0
WHEEZING: 1
CHEST TIGHTNESS: 0
EYE ITCHING: 0
BACK PAIN: 0
VOICE CHANGE: 0

## 2020-09-17 ASSESSMENT — PAIN SCALES - GENERAL
PAINLEVEL_OUTOF10: 0

## 2020-09-17 ASSESSMENT — PAIN DESCRIPTION - FREQUENCY: FREQUENCY: OTHER (COMMENT)

## 2020-09-17 ASSESSMENT — PAIN DESCRIPTION - ONSET: ONSET: OTHER (COMMENT)

## 2020-09-17 ASSESSMENT — PAIN - FUNCTIONAL ASSESSMENT: PAIN_FUNCTIONAL_ASSESSMENT: ACTIVITIES ARE NOT PREVENTED

## 2020-09-17 ASSESSMENT — PAIN DESCRIPTION - LOCATION: LOCATION: OTHER (COMMENT)

## 2020-09-17 ASSESSMENT — PAIN DESCRIPTION - PAIN TYPE: TYPE: OTHER (COMMENT)

## 2020-09-17 ASSESSMENT — PAIN DESCRIPTION - PROGRESSION: CLINICAL_PROGRESSION: OTHER (COMMENT)

## 2020-09-17 ASSESSMENT — PAIN DESCRIPTION - DESCRIPTORS: DESCRIPTORS: OTHER (COMMENT)

## 2020-09-17 ASSESSMENT — PAIN DESCRIPTION - ORIENTATION: ORIENTATION: OTHER (COMMENT)

## 2020-09-17 NOTE — ED TRIAGE NOTES
Patient presents to ED with chief complaint of Fatigue. Patient arrives by private vehicle. Patient brought in by Ex-wife because she went to his home to check on him and he was unable to get up from the couch. Patient has a history of COPD and Stage 1 lung cancer. Ex-wife states that over the past couple of weeks patient has had a decreased appetite and has been unable to move around on his own. Ex-wife states that he has no family and she is the last person he has, so she felt the need to check up with him. Patient AOx4. Upon arrival, patient O2 saturation 90% on room air, but now 98% on 2L. Respirations unlabored, and after coughing and deep breathing, lungs sound a little better. Patient had fluid take on of right lung on 8/24/20. Now, patient is scheduled to have fluid taken off of both lungs 9/17/20 at 0930, and scheduled to have his first radiation treatment the same day. Call light in reach. Ex-wife at bedside.

## 2020-09-17 NOTE — H&P
Hospitalist - History & Physical      Patient: Dave Ray    Unit/Bed:06/006A  YOB: 1940  MRN: [id number]   Acct: [de-identified]   PCP: Jose Cortes DO    Date of Service: Pt seen/examined on 09/16/20  and Admitted to Inpatient with expected LOS greater than two midnights due to medical therapy. Chief Complaint: Generalized fatigue and weakness    Assessment and Plan:-  Bilateral pleural effusion  Unclear if related to malignancy or congestion from volume overload. Patient has no reported history of heart failure. No echo found per chart review. No dyspnea at rest at this time, on 3 L of nasal cannula. IR consulted for pleural effusion tap tomorrow, he was already scheduled with them as outpatient for 0930. Kept NPO past midnight. Will order pleural fluids analysis. FERNANDA stage II (Cr 1.7 on admission, baseline 0.5 on 6/29/2020 per care everywhere)   Likely prerenal in the setting of reduced p.o. intake and generalized weakness vs possible cardiorenal syndrome in the setting of clinical volume overload. Will give one-time dose of Lasix IV 20 mg for diuresis. Monitor I/O's. Trend BMP. Generalized weakness/Fatigue  Likely multifactorial in the setting of underlying lung cancer and reduced PO intake. Encourage PO intake after procedure is done. Transaminitis:  (46 on 6/29/2020), ,  (171 on 6/29/2020), Bilirubin 1, direct bili elevated at 0.7. Other LFTs WNL on 6/29/2020. Unclear etiology at this time. Trend hepatic panel. Statin held. May consider liver US. Stage I lung cancer: planned for radiation therapy today after thoracentesis of bilateral pleural effusion. Follows with Dr. Cline Husbands. Hx of COPD: supposed to be oxygen, has the home equipment but does not use, continues to smoke. Home meds did not include any treatment for COPD. No other signs of exacerbation at this time.  Started on Duonebs q4h while awake given mild expiratory wheezing noted on exam. Hx of HTN: uncontrolled. /77 on admission. On Norvasc and Losartan at home. Losartan held due to FERNANDA at this time. Hx of Afib: per chart review. currently sinus rhythm. On Eliquis but reports he is on it due to history of \"clots\". Unable to find history of DVT on chart review. On Coreg. Hx of HLD: on ASA and pravastatin (held at this time due to transaminitis)    Medical deconditioning: patient lives alone and has not been doing well on his own. Ex-wife lives 45 mins away from him. She expressed concern about his care, she is unable provide care for him at home. May consider social work consult. Tobacco use disorder: 2-3 packs per day for 70 years. Patient not willing to quit. DVT ppx: on Eliquis (held at this time for planned procedure tomorrow per IR)      History Of Present Illness:    68 y.o. with PMH of HTN, CAD, HLD, COPD, PAF, brought in by his ex-wife due to ongoing generalized fatigue for the past 2-3 weeks. Patient lives alone but his wife checked on him today, found patient unable to get up from the couch which prompted the ED visit. Patient was recently diagnosed with lung cancer stage I and was scheduled to initiate radiation therapy when he was found to have bilateral pleural effusions which lead to cancellation of his therapy. Patient is scheduled to get pleural effusions tapped at 0930 today (9/17/2020) and then is scheduled to get radiation therapy afterwards. ED: presented afebrile and hemodynamically stable, hypertensive to 144/77. Placed on 2L NC, saturating >95%. Labs revealed Cr 1.7 (baseline 0.70), elevated LFTs, elevated trop at 0.038, anemia to 11.5, UA negative for infection. CXR showed congestion with complete opacification of the LLL likely due to combination of consolidation and pleural fluid. CT chest showed bilateral pleural effusions, left upper and lower lobe consolidations, nodular densities in the right lung, cardiomegaly with small pericardial effusion.  CT A/P revealed small amount of ascites in the pelvis, hepatomegaly, cholelithiasis, and bilateral non-obstructive nephrolithiasis. COVID negative. Patient follows with Dr. Josh Waller, radiation oncology. He has an extensive smoking history, 2-3 ppd since he was 8years old. Patient is supposed to be on oxygen at home but does not use it given that he continues to smoke. Per patient and his ex-wife, he has been feeling increasingly fatigued, limiting his ability to move around. He reportedly had a fall out of his couch the morning prior to admission, he could not get up on his own so he had to call 911 who helped him get back up. He has not been able to eat or drink much in the past 2 to 3 weeks due to weakness. he reports having dyspnea at baseline which is recently progressively worsening. Patient reports having no chest pain, dizziness, diaphoresis, nausea vomiting, abdominal pain, dysuria, headaches, subjective fevers or chills. Per his ex-wife he always has some edema in his legs which she attributes to his venous problems. Past Medical History:        Diagnosis Date    Arthritis     CAD (coronary artery disease)     Cancer ([facility] [address]     COPD (chronic obstructive pulmonary disease) ([facility] [address]     Hyperlipidemia     Hypertension        Past Surgical History:        Procedure Laterality Date    EYE SURGERY      Cataracts removed    JOINT REPLACEMENT      Hip    LUNG BIOPSY      VASCULAR SURGERY      Stents in leg       Home Medications:   No current facility-administered medications on file prior to encounter.       Current Outpatient Medications on File Prior to Encounter   Medication Sig Dispense Refill    amLODIPine (NORVASC) 10 MG tablet Take 10 mg by mouth daily      apixaban (ELIQUIS) 5 MG TABS tablet Take by mouth 2 times daily      carvedilol (COREG) 6.25 MG tablet Take 6.25 mg by mouth 2 times daily (with meals)      diclofenac sodium (VOLTAREN) 1 % GEL Apply 4 g topically 4 times daily as needed for Pain      lidocaine (LIDODERM) 5 % Place 1 patch onto the skin daily 12 hours on, 12 hours off.  losartan (COZAAR) 100 MG tablet Take 100 mg by mouth daily      methocarbamol (ROBAXIN) 500 MG tablet Take 1,000 mg by mouth 3 times daily      Multiple Vitamins-Minerals (ICAPS AREDS 2 PO) Take 1 capsule by mouth 2 times daily      pravastatin (PRAVACHOL) 40 MG tablet Take 20 mg by mouth daily      sildenafil (VIAGRA) 100 MG tablet Take 100 mg by mouth as needed for Erectile Dysfunction      spironolactone (ALDACTONE) 25 MG tablet Take 25 mg by mouth daily         Allergies:    Patient has no known allergies. Social History:    reports that he has been smoking cigarettes. He started smoking about 70 years ago. He has a 34.00 pack-year smoking history. He has never used smokeless tobacco. He reports current alcohol use of about 3.0 standard drinks of alcohol per week. He reports that he does not use drugs. Family History:       Problem Relation Age of Onset    Liver Disease Mother        Diet:  No diet orders on file    Review of systems:   Pertinent positives as noted in the HPI. All other systems reviewed and negative. PHYSICAL EXAM:  /66   Pulse 81   Temp 97.3 °F (36.3 °C)   Resp 20   Ht 5' 11\" (1.803 m)   Wt 150 lb (68 kg)   SpO2 100%   BMI 20.92 kg/m²   General appearance: No apparent distress, appears stated age and cooperative. Disheveled. HEENT: Normal cephalic, atraumatic without obvious deformity. Pupils equal, round, and reactive to light. Extra ocular muscles intact. Conjunctivae/corneas clear. Neck: Supple, with full range of motion. No jugular venous distention. Trachea midline. Respiratory: Normal respiratory effort. Diminished lung sounds bilaterally, diffuse rales with mild expiratory wheezing noted, without Rhonchi. On 3 L of nasal cannula. Cardiovascular: Regular rate and rhythm with normal S1/S2 without murmurs, rubs or gallops.   Abdomen: Soft, technology. **      Final report electronically signed by Dr. Caridad Hess on 9/16/2020 11:16 PM      XR CHEST PORTABLE   Final Result   Congestive failure with complete opacification of the left lower lobe likely due to combination of consolidation and pleural fluid. Probable posteriorly layered right effusion versus developing infiltrate            **This report has been created using voice recognition software. It may contain minor errors which are inherent in voice recognition technology. **      Final report electronically signed by Dr. Caridad Hess on 9/16/2020 10:33 PM        Ct Abdomen Pelvis Wo Contrast Additional Contrast? None    Result Date: 9/16/2020  PROCEDURE: CT ABDOMEN PELVIS WO CONTRAST CLINICAL INFORMATION: Abdominal pain recently diagnosed with lung cancer. . COMPARISON: No prior study. TECHNIQUE: 2-D multiplanar noncontrast images of the abdomen and pelvis All CT scans at this facility use dose modulation, iterative reconstruction, and/or weight-based dosing when appropriate to reduce radiation dose to as low as reasonably achievable. FINDINGS: Limitations: Solid organ or hollow viscera evaluation limited without contrast. Lung bases Please see the same-day dedicated exam Abdomen pelvis Hepatomegaly. Distention of the IVC and hepatic veins may indicate right heart failure. Spleen is normal. Fullness of the left adrenal gland could be related to metastatic disease. Right adrenal is not definitively identified. Nonobstructing right intrarenal calculi. Nonobstructing left intrarenal calculi. Gallstones. Pancreas is atrophic. Aorta is atherosclerotic. Pelvis There is ascites within the pelvis. Urinary bladder is unremarkable. There is no bowel obstruction. Degradation of images by right hip replacement. Subcutaneous edema throughout the abdomen and pelvis which is greater on the left suggesting third spacing. No suspicious bone lesions     1. Small amount of ascites in the pelvis 2. Hepatomegaly 3. Cholelithiasis 4. Bilateral nonobstructive nephrolithiasis. **This report has been created using voice recognition software. It may contain minor errors which are inherent in voice recognition technology. ** Final report electronically signed by Dr. Tad Monae on 9/16/2020 11:16 PM    Ct Chest Wo Contrast    Result Date: 9/16/2020  PROCEDURE: CT CHEST WO CONTRAST CLINICAL INFORMATION: Hypoxia shortness of breath, recently diagnosed with bilateral pleural effusions and lung cancer. . COMPARISON: No prior study. TECHNIQUE: 2-D multiplanar noncontrast images of the chest All CT scans at this facility use dose modulation, iterative reconstruction, and/or weight-based dosing when appropriate to reduce radiation dose to as low as reasonably achievable. FINDINGS: Large bilateral pleural effusions left greater than right. Left lower lobe compressive atelectasis or consolidation. Left upper lobe consolidation at the major fissure. Centrilobular emphysematous changes. Spiculated nodular density posterior right upper lung image 18 series 2 measuring 10 mm. Nodule versus focal atelectasis posterior right upper lung 6 mm. Image 31 Left hilar masslike consolidation. A shallow adenopathy with a single 10 mm short axis pretracheal node and a 1.3 cm subcarinal node. Aorta is moderately atherosclerotic with calcific atherosclerosis. Heart size is enlarged. Minimal apical pericardial effusion 1 cm. No suspicious bone lesions. ABDOMEN: Please see the same-day dedicated exam     Bilateral pleural effusions. Left upper and lower lobe consolidations. Nodular densities right lung. Cardiomegaly with small pericardial effusion. **This report has been created using voice recognition software. It may contain minor errors which are inherent in voice recognition technology. ** Final report electronically signed by Dr. Tad Monae on 9/16/2020 11:13 PM    Xr Chest Portable    Result Date: 9/16/2020  PROCEDURE: XR CHEST PORTABLE CLINICAL INFORMATION: Fatigue cough shortness of breath . COMPARISON: 8/25/2020 TECHNIQUE: Portable upright FINDINGS: COMPLETE opacification of the left lower lobe airspace consolidation and pleural fluid. Cardiomegaly. Pulmonary vascular congestion. Haziness right lower lobe probably posterior pleural fluid. EKG leads overlie the chest.     Congestive failure with complete opacification of the left lower lobe likely due to combination of consolidation and pleural fluid. Probable posteriorly layered right effusion versus developing infiltrate **This report has been created using voice recognition software. It may contain minor errors which are inherent in voice recognition technology. ** Final report electronically signed by Dr. William Rios on 9/16/2020 10:33 PM        EKG:       Electronically signed by   Titi Feng MD   PGY1, Internal Medicine  9/16/2020 at 11:53 PM

## 2020-09-17 NOTE — PROGRESS NOTES
0230 - Patient arrived to unit from ED via cart into room 6K25. Patient admitted for pleural effusion. Patient confused. Vitals obtained. See flowsheets. Patient's IV access includes INT to LAC, flushed and patent. Assessment completed. Two nurse skin assessment completed by this RN and Zonia RN. Scattered bruising, scabbed areas, redness to buttock/groin area (blanchable). See flowsheets for assessment details. Generalized non-pitting edema, 2+ edema to left arm. Policies and procedures of  explained to patient at this time. Patient rights explained to patient, as able. Patien Declined to have physician notified of their admission. All questions posed by patient answered at this time. Home medication bottles were bought up to floor with the patient from ED. Reviewed, patient could not say when he last took any medications or if all the medications were brought. No family present. Patient unable to answer all admission questions. Bed alarm on and side rails up x3. Call light within reach.

## 2020-09-17 NOTE — ED NOTES
Patient resting in bed. Respirations easy and unlabored. No distress noted. Denies pain. Call light within reach.         Yossi Kirk RN  09/16/20 0330

## 2020-09-17 NOTE — PROGRESS NOTES
Transferring to 4K for high flow nasal cannula. Report called and given to Kaylee Plunkett RN on 4K prior to transfer.

## 2020-09-17 NOTE — FLOWSHEET NOTE
09/17/20 1802   Provider Notification   Reason for Communication New orders  (Oncology consult)   Provider Name Dr. Petty Vallecillo   Provider Notification Physician   Method of Communication Page   Notification Time [phone]: Message left with Dr. Mike Escobedo regarding new consult for stage 1 lung cancer. Patient does see Dr. Thierno Roberson.

## 2020-09-17 NOTE — ED NOTES
Patient's ex-wife states that she is very worried about him staying at home alone, since she is not able to be with him that often.      Eliane Capellan RN  09/16/20 9319

## 2020-09-17 NOTE — PROGRESS NOTES
PROGRESS NOTE      Patient:  Rajesh Wood      Unit/Bed:6K-25/025-A    YOB: 1940    MRN: [id number]       Acct: [de-identified]     PCP: Andria Tate DO    Date of Admission: 9/16/2020      Assessment/Plan:    Acute on chronic hypoxic respiratory failure  Secondary to right upper lobe lung cancer and potential COPD exacerbation  Initially presented to the ED requiring 2 L nasal cannula, has oxygen at home but does not use it-desaturation to 87 to 88% 9/17 requiring 6 L nasal cannula  Decreased breath sounds bilaterally on exam with crackles bilateral lower lung fields  CT scan of the chest with large bilateral pleural effusions left greater than right, centrilobular emphysematous changes, spiculated nodular density posterior right upper lung, left masslike consolidation  Followed by Dr. Luis Enrique Johnson, oncology  Plan for thoracocentesis and future radiation treatment  Continue to monitor wean O2 as tolerated    Bilateral pleural effusions  Likely due to lung cancer, however pulmonary congestion due to fluid overload cannot be excluded, no echo on file  Seen on CT scan of chest  IV fluids held, a dose of Lasix given  Thoracocentesis planned and ordered right today, left tomorrow  Pleural fluid analysis      Stage I lung cancer  States he has had it for about 2 weeks. Seen on CT.,  Followed by Dr. Luis Enrique Johnson, oncology  Was scheduled for thoracocentesis of the right lung, with potential radiation therapy following  Thoracocentesis ordered  Robaxin ordered     FERNANDA  Creatinine 1.7 on arrival trending down to 1.5 on 9/17  Baseline unclear     Transaminitis, improving  On arrival alk phos 201, , , bilirubin 1.1, direct bilirubin 0.7  09/17 improved with alk phos now 183, , , bilirubin 0.9, direct bilirubin 0.5  Potentially due to statin. Continue to hold.   Continue to trend    COPD Potential exacerbation  -required 2 L in the ED, desatted down to 87-88 overnight, transitioned from 4 L to 6 L  Supposed to be on home oxygen, but does not use, continues to smoke  Does not take anything for COPD per home med list  Duo nebs q. 4    Generalized weakness  Multifactorial: Lung cancer, decreased p.o. intake    Hypertension  /77 on admission. On Norvasc and losartan at home. Losartan held for FERNANDA    A. fib controlled  On Eliquis, held for thoracocentesis planned for today  Continue Coreg    HLD  On pravastatin, held for transaminitis    Medical deconditioning/malnutrition  patient lives alone and has not been doing well on his own. Ex-wife lives 45 mins away from him. She expressed concern about his care, she is unable provide care for him at home. Hypoalbuminemia at 3.2 and total protein of 5.9  Dietitian consulted  Social work consulted  PT OT consulted    Tobacco use disorder  Has smoked 2 to 3 packs/day for 70 years. Unwilling to quit      Chief Complaint:     Hospital Course:   Per HPI     \"72 y.o. with PMH of HTN, CAD, HLD, COPD, PAF, brought in by his ex-wife due to ongoing generalized fatigue for the past 2-3 weeks. Patient lives alone but his wife checked on him today, found patient unable to get up from the couch which prompted the ED visit. Patient was recently diagnosed with lung cancer stage I and was scheduled to initiate radiation therapy when he was found to have bilateral pleural effusions which lead to cancellation of his therapy. Patient is scheduled to get pleural effusions tapped at 0930 today (9/17/2020) and then is scheduled to get radiation therapy afterwards.     ED: presented afebrile and hemodynamically stable, hypertensive to 144/77. Placed on 2L NC, saturating >95%. Labs revealed Cr 1.7 (baseline 0.70), elevated LFTs, elevated trop at 0.038, anemia to 11.5, UA negative for infection. CXR showed congestion with complete opacification of the LLL likely due to combination of consolidation and pleural fluid.  CT chest showed bilateral pleural effusions, left upper and lower lobe consolidations, nodular densities in the right lung, cardiomegaly with small pericardial effusion. CT A/P revealed small amount of ascites in the pelvis, hepatomegaly, cholelithiasis, and bilateral non-obstructive nephrolithiasis. COVID negative.     Patient follows with Dr. Michelle Reynolds, radiation oncology. He has an extensive smoking history, 2-3 ppd since he was 8years old. Patient is supposed to be on oxygen at home but does not use it given that he continues to smoke.     Per patient and his ex-wife, he has been feeling increasingly fatigued, limiting his ability to move around. He reportedly had a fall out of his couch the morning prior to admission, he could not get up on his own so he had to call 911 who helped him get back up. He has not been able to eat or drink much in the past 2 to 3 weeks due to weakness. he reports having dyspnea at baseline which is recently progressively worsening. Patient reports having no chest pain, dizziness, diaphoresis, nausea vomiting, abdominal pain, dysuria, headaches, subjective fevers or chills. Per his ex-wife he always has some edema in his legs which she attributes to his venous problems. \"    Subjective: The patient was seen and evaluated this morning. He is still complaining of shortness of breath. He did require increase in his supplemental oxygen from 3 L to 6 L overnight. Currently on 6 L nasal cannula. Otherwise denies chest pain, nausea, vomiting, abdominal pain, dizziness. Currently n.p.o. for thoracocentesis.     Medications:  Reviewed    Infusion Medications   Scheduled Medications    sodium chloride flush  10 mL Intravenous 2 times per day    ipratropium-albuterol  1 ampule Inhalation Q4H WA    amLODIPine  10 mg Oral Daily    [Held by provider] apixaban  5 mg Oral BID    carvedilol  6.25 mg Oral BID WC    lidocaine  1 patch Transdermal Daily    [Held by provider] losartan  100 mg Oral Daily    methocarbamol  1,000 mg Oral TID    [Held by provider] pravastatin  20 mg Oral Daily    spironolactone  25 mg Oral Daily     PRN Meds: sodium chloride flush, acetaminophen **OR** acetaminophen, polyethylene glycol, promethazine **OR** ondansetron      Intake/Output Summary (Last 24 hours) at 9/17/2020 1054  Last data filed at 9/17/2020 0932  Gross per 24 hour   Intake 10 ml   Output --   Net 10 ml       Diet:  Diet NPO Effective Now    Exam:  BP (!) 147/68   Pulse 78   Temp 98.7 °F (37.1 °C) (Oral)   Resp 18   Ht 6' (1.829 m)   Wt 164 lb 14.5 oz (74.8 kg)   SpO2 90%   BMI 22.37 kg/m²     General appearance: Not in acute distress. Currently on [facility]. Appears disheveled. HEENT: Pupils equal, round, and reactive to light. Conjunctivae/corneas clear. Neck: Supple, with full range of motion. No jugular venous distention. Trachea midline. Respiratory:  Requiring 6L NC. Decreased breath sound bilaterally. Crackles in bilateral lung bases. No wheezing. Cardiovascular: Regular rate and rhythm with normal S1/S2 without murmurs, rubs or gallops. Abdomen: Soft, non-tender, non-distended with normal bowel sounds. Musculoskeletal: Bilateral lower extremity edema. passive and active ROM x 4 extremities. Skin: No rashes or lesions. Neurologic:  Neurovascularly intact without any focal sensory/motor deficits.  Cranial nerves: II-XII intact, grossly non-focal.  Psychiatric: Alert and oriented, thought content appropriate, normal insight  Capillary Refill: Brisk,< 3 seconds   Peripheral Pulses: +2 palpable, equal bilaterally       Labs:   Recent Labs     09/16/20 2130 09/17/20 0317   WBC 6.8 6.3   HGB 11.5* 10.7*   HCT 38.8* 35.3*    215     Recent Labs     09/16/20 2130 09/17/20 0317    142   K 4.9 4.6    102   CO2 28 28   BUN 30* 31*   CREATININE 1.7* 1.5*   CALCIUM 9.3 9.0     Recent Labs     09/16/20 2130 09/17/20 0317   * 202*   * 139*   BILIDIR 0.7* 0.5*   BILITOT 1.1 0.9 ALKPHOS 201* 183*     Recent Labs     09/16/20  2130   INR 1.22*     No results for input(s): Yg Rosario in the last 72 hours. Urinalysis:      Lab Results   Component Value Date    NITRU NEGATIVE 09/16/2020    WBCUA 2-4 09/16/2020    BACTERIA NONE SEEN 09/16/2020    RBCUA 3-5 09/16/2020    BLOODU NEGATIVE 09/16/2020    GLUCOSEU NEGATIVE 09/16/2020       Radiology:  CT CHEST WO CONTRAST   Final Result   Bilateral pleural effusions. Left upper and lower lobe consolidations. Nodular densities right lung. Cardiomegaly with small pericardial effusion. **This report has been created using voice recognition software. It may contain minor errors which are inherent in voice recognition technology. **      Final report electronically signed by Dr. Deborah Haney on 9/16/2020 11:13 PM      CT ABDOMEN PELVIS WO CONTRAST Additional Contrast? None   Final Result   1. Small amount of ascites in the pelvis   2. Hepatomegaly   3. Cholelithiasis   4. Bilateral nonobstructive nephrolithiasis. **This report has been created using voice recognition software. It may contain minor errors which are inherent in voice recognition technology. **      Final report electronically signed by Dr. Deborah Haney on 9/16/2020 11:16 PM      XR CHEST PORTABLE   Final Result   Congestive failure with complete opacification of the left lower lobe likely due to combination of consolidation and pleural fluid. Probable posteriorly layered right effusion versus developing infiltrate            **This report has been created using voice recognition software. It may contain minor errors which are inherent in voice recognition technology. **      Final report electronically signed by Dr. Deborah Haney on 9/16/2020 10:33 PM      US THORACENTESIS    (Results Pending)       Diet: Diet NPO Effective Now    DVT prophylaxis: [] Lovenox                                 [] SCDs                                 [] SQ Heparin [] Encourage ambulation           [x] Already on Anticoagulation     Disposition:    [] Home       [] TCU       [] Rehab       [] Psych       [] SNF       [] [place]       [x] Other- Pending     Code Status: Full Code    PT/OT Eval Status:       Electronically signed by Gabriela Rdz MD on 9/17/2020 at 10:54 AM

## 2020-09-17 NOTE — ED PROVIDER NOTES
abdominal pain, blood in stool, constipation, diarrhea, nausea and vomiting. Endocrine: Negative for polydipsia, polyphagia and polyuria. Genitourinary: Negative for decreased urine volume, difficulty urinating, dysuria, enuresis, frequency, hematuria, penile pain, penile swelling, scrotal swelling and urgency. Musculoskeletal: Negative for arthralgias, back pain, gait problem, myalgias, neck pain and neck stiffness. Skin: Negative for pallor and rash. Allergic/Immunologic: Negative for immunocompromised state. Neurological: Negative for dizziness, tremors, seizures, syncope, facial asymmetry, weakness, light-headedness, numbness and headaches. Hematological: Negative for adenopathy. Does not bruise/bleed easily. Psychiatric/Behavioral: Negative for agitation, confusion, hallucinations and suicidal ideas. The patient is not nervous/anxious. PAST MEDICAL HISTORY    has a past medical history of Arthritis, CAD (coronary artery disease), Cancer ([facility] [address] COPD (chronic obstructive pulmonary disease) ([facility] [address] Hyperlipidemia, and Hypertension. SURGICAL HISTORY      has a past surgical history that includes joint replacement; vascular surgery; eye surgery; and Lung biopsy.     CURRENT MEDICATIONS       Current Discharge Medication List      CONTINUE these medications which have NOT CHANGED    Details   amLODIPine (NORVASC) 10 MG tablet Take 10 mg by mouth daily      apixaban (ELIQUIS) 5 MG TABS tablet Take by mouth 2 times daily      carvedilol (COREG) 6.25 MG tablet Take 6.25 mg by mouth 2 times daily (with meals)      diclofenac sodium (VOLTAREN) 1 % GEL Apply 4 g topically 4 times daily as needed for Pain      lidocaine (LIDODERM) 5 % Place 1 patch onto the skin daily 12 hours on, 12 hours off.      losartan (COZAAR) 100 MG tablet Take 100 mg by mouth daily      methocarbamol (ROBAXIN) 500 MG tablet Take 1,000 mg by mouth 3 times daily      Multiple Vitamins-Minerals (ICAPS AREDS 2 PO) Take 1 lung cancer. DIAGNOSTIC RESULTS     EKG: All EKG's are interpreted by the Emergency Department Physician who either signs or Co-signs this chart in the absence of a cardiologist.  EKG was sinus rhythm, ventricular rate of 87, NV interval 154, QT interval 348, QRS duration of 86, QTC of 418, frequent PVCs. RADIOLOGY: non-plain film images(s) such as CT, Ultrasound and MRI are read by the radiologist.  CT CHEST WO CONTRAST   Final Result   Bilateral pleural effusions. Left upper and lower lobe consolidations. Nodular densities right lung. Cardiomegaly with small pericardial effusion. **This report has been created using voice recognition software. It may contain minor errors which are inherent in voice recognition technology. **      Final report electronically signed by Dr. Rigo Khalil on 9/16/2020 11:13 PM      CT ABDOMEN PELVIS WO CONTRAST Additional Contrast? None   Final Result   1. Small amount of ascites in the pelvis   2. Hepatomegaly   3. Cholelithiasis   4. Bilateral nonobstructive nephrolithiasis. **This report has been created using voice recognition software. It may contain minor errors which are inherent in voice recognition technology. **      Final report electronically signed by Dr. Rigo Khalil on 9/16/2020 11:16 PM      XR CHEST PORTABLE   Final Result   Congestive failure with complete opacification of the left lower lobe likely due to combination of consolidation and pleural fluid. Probable posteriorly layered right effusion versus developing infiltrate            **This report has been created using voice recognition software. It may contain minor errors which are inherent in voice recognition technology. **      Final report electronically signed by Dr. Rigo Khalil on 9/16/2020 10:33 PM      IR  [address]    (Results Pending)         LABS:   Labs Reviewed   CBC WITH AUTO DIFFERENTIAL - Abnormal; Notable for the following components:

## 2020-09-17 NOTE — ED NOTES
Patient resting in bed. Respirations easy and unlabored. No distress noted. Covid swab collected and sent to lab. Call light within reach.         Seda Fischer RN  09/16/20 7582

## 2020-09-17 NOTE — ED NOTES
ED to inpatient nurses report    Chief Complaint   Patient presents with    Fatigue      Present to ED from home  LOC: alert and orientated to name, place, date  Vital signs   Vitals:    09/16/20 2201 09/16/20 2207 09/16/20 2240 09/17/20 0008   BP:  (!) 142/70 126/66 (!) 140/68   Pulse: 85  81 83   Resp: 18  20 18   Temp:       SpO2: 95%  100% 99%   Weight:       Height:          Oxygen Baseline 0L    Current needs required 3L Bipap/Cpap No  LDAs:  20 gauge right AC  Mobility: Requires assistance * 2  Pending ED orders: none  Present condition: Stable    Electronically signed by Micaela Esquivel RN on 9/17/2020 at 12:10 AM       Micaela Esquivel RN  09/17/20 0011

## 2020-09-17 NOTE — PROGRESS NOTES
Pt is alert and oriented x 4. Pt resting with eyes closed. Respirations normal depth, symmetrical. Expiratory wheezing present bilaterally. Pt 88% SpO2 at 6 L with continuous O2. Notified Primary nurse about low SpO2. Pt denies any other concerns at this time. Pt transferred to Ultrasound for R sided thoracentesis.      [facility] Nursing Student

## 2020-09-18 ENCOUNTER — APPOINTMENT (OUTPATIENT)
Dept: GENERAL RADIOLOGY | Age: 80
DRG: 208 | End: 2020-09-18
Payer: OTHER GOVERNMENT

## 2020-09-18 ENCOUNTER — APPOINTMENT (OUTPATIENT)
Dept: ULTRASOUND IMAGING | Age: 80
DRG: 208 | End: 2020-09-18
Payer: OTHER GOVERNMENT

## 2020-09-18 PROBLEM — E44.0 MODERATE MALNUTRITION (HCC): Status: ACTIVE | Noted: 2020-09-18

## 2020-09-18 LAB
ABSOLUTE RETIC #: 102 THOU/MM3 (ref 20–115)
ALBUMIN SERPL-MCNC: 2.8 G/DL (ref 3.5–5.1)
ALLEN TEST: POSITIVE
ALP BLD-CCNC: 149 U/L (ref 38–126)
ALT SERPL-CCNC: 109 U/L (ref 11–66)
ANION GAP SERPL CALCULATED.3IONS-SCNC: 13 MEQ/L (ref 8–16)
AST SERPL-CCNC: 106 U/L (ref 5–40)
BASE EXCESS (CALCULATED): 4.9 MMOL/L (ref -2.5–2.5)
BILIRUB SERPL-MCNC: 0.6 MG/DL (ref 0.3–1.2)
BODY FLUID RBC: < 2000 /CUMM
BODY FLUID RBC: < 2000 /CUMM
BUN BLDV-MCNC: 31 MG/DL (ref 7–22)
CALCIUM SERPL-MCNC: 8.7 MG/DL (ref 8.5–10.5)
CHARACTER, BODY FLUID: CLEAR
CHARACTER, BODY FLUID: CLEAR
CHLORIDE BLD-SCNC: 103 MEQ/L (ref 98–111)
CO2: 30 MEQ/L (ref 23–33)
COLLECTED BY:: ABNORMAL
COLOR: NORMAL
COLOR: NORMAL
CREAT SERPL-MCNC: 1.2 MG/DL (ref 0.4–1.2)
DEVICE: ABNORMAL
ERYTHROCYTE [DISTWIDTH] IN BLOOD BY AUTOMATED COUNT: 14.6 % (ref 11.5–14.5)
ERYTHROCYTE [DISTWIDTH] IN BLOOD BY AUTOMATED COUNT: 55.6 FL (ref 35–45)
FOLATE: 2.6 NG/ML (ref 4.8–24.2)
GFR SERPL CREATININE-BSD FRML MDRD: 59 ML/MIN/1.73M2
GLUCOSE BLD-MCNC: 69 MG/DL (ref 70–108)
GLUCOSE, FLUID: 78 MG/DL
HCO3: 30 MMOL/L (ref 23–28)
HCT VFR BLD CALC: 34.3 % (ref 42–52)
HEMOGLOBIN: 10.6 GM/DL (ref 14–18)
IFIO2: 15
IMMATURE RETIC FRACT: 25.4 % (ref 2.3–13.4)
LD, FLUID: 54 U/L
LD: 199 U/L (ref 100–190)
MCH RBC QN AUTO: 32 PG (ref 26–33)
MCHC RBC AUTO-ENTMCNC: 30.9 GM/DL (ref 32.2–35.5)
MCV RBC AUTO: 103.6 FL (ref 80–94)
MONONUCLEAR CELLS BODY FLUID: 76.7 %
MONONUCLEAR CELLS BODY FLUID: 79.7 %
MRSA SCREEN RT-PCR: NEGATIVE
O2 SATURATION: 92 %
PATHOLOGIST REVIEW: NORMAL
PATHOLOGIST REVIEW: NORMAL
PCO2: 48 MMHG (ref 35–45)
PH BLOOD GAS: 7.41 (ref 7.35–7.45)
PHOSPHORUS: 3.4 MG/DL (ref 2.4–4.7)
PLATELET # BLD: 210 THOU/MM3 (ref 130–400)
PMV BLD AUTO: 10.3 FL (ref 9.4–12.4)
PO2: 64 MMHG (ref 71–104)
POLYMORPHONUCLEAR CELLS BODY FLUID: 20.3 %
POLYMORPHONUCLEAR CELLS BODY FLUID: 23.3 %
POTASSIUM SERPL-SCNC: 4.1 MEQ/L (ref 3.5–5.2)
PROTEIN FLUID: 1.4 GM/DL
RBC # BLD: 3.31 MILL/MM3 (ref 4.7–6.1)
RETIC HEMOGLOBIN: 29.7 PG (ref 28.2–35.7)
RETICULOCYTE ABSOLUTE COUNT: 3.1 % (ref 0.5–2)
SODIUM BLD-SCNC: 146 MEQ/L (ref 135–145)
SOURCE, BLOOD GAS: ABNORMAL
SPECIMEN: NORMAL
SPECIMEN: NORMAL
TOTAL NUCLEATED CELLS BODY FLUID: 108 /CUMM (ref 0–500)
TOTAL NUCLEATED CELLS BODY FLUID: 184 /CUMM (ref 0–500)
TOTAL PROTEIN: 5.2 G/DL (ref 6.1–8)
TOTAL VOLUME RECEIVED BODY FLUID: 70 ML
TOTAL VOLUME RECEIVED BODY FLUID: 70 ML
TROPONIN T: 0.04 NG/ML
VITAMIN B-12: 960 PG/ML (ref 211–911)
VITAMIN D 25-HYDROXY: 74 NG/ML (ref 30–100)
WBC # BLD: 7 THOU/MM3 (ref 4.8–10.8)

## 2020-09-18 PROCEDURE — 99233 SBSQ HOSP IP/OBS HIGH 50: CPT | Performed by: INTERNAL MEDICINE

## 2020-09-18 PROCEDURE — 36600 WITHDRAWAL OF ARTERIAL BLOOD: CPT

## 2020-09-18 PROCEDURE — 6370000000 HC RX 637 (ALT 250 FOR IP): Performed by: INTERNAL MEDICINE

## 2020-09-18 PROCEDURE — 94761 N-INVAS EAR/PLS OXIMETRY MLT: CPT

## 2020-09-18 PROCEDURE — 97110 THERAPEUTIC EXERCISES: CPT

## 2020-09-18 PROCEDURE — 82306 VITAMIN D 25 HYDROXY: CPT

## 2020-09-18 PROCEDURE — 84484 ASSAY OF TROPONIN QUANT: CPT

## 2020-09-18 PROCEDURE — 85027 COMPLETE CBC AUTOMATED: CPT

## 2020-09-18 PROCEDURE — 32555 ASPIRATE PLEURA W/ IMAGING: CPT

## 2020-09-18 PROCEDURE — 84238 ASSAY NONENDOCRINE RECEPTOR: CPT

## 2020-09-18 PROCEDURE — 80053 COMPREHEN METABOLIC PANEL: CPT

## 2020-09-18 PROCEDURE — 6370000000 HC RX 637 (ALT 250 FOR IP): Performed by: STUDENT IN AN ORGANIZED HEALTH CARE EDUCATION/TRAINING PROGRAM

## 2020-09-18 PROCEDURE — 89050 BODY FLUID CELL COUNT: CPT

## 2020-09-18 PROCEDURE — 97166 OT EVAL MOD COMPLEX 45 MIN: CPT

## 2020-09-18 PROCEDURE — 2060000000 HC ICU INTERMEDIATE R&B

## 2020-09-18 PROCEDURE — 82746 ASSAY OF FOLIC ACID SERUM: CPT

## 2020-09-18 PROCEDURE — 2580000003 HC RX 258: Performed by: STUDENT IN AN ORGANIZED HEALTH CARE EDUCATION/TRAINING PROGRAM

## 2020-09-18 PROCEDURE — 71045 X-RAY EXAM CHEST 1 VIEW: CPT

## 2020-09-18 PROCEDURE — 0W9B3ZZ DRAINAGE OF LEFT PLEURAL CAVITY, PERCUTANEOUS APPROACH: ICD-10-PCS | Performed by: RADIOLOGY

## 2020-09-18 PROCEDURE — 82945 GLUCOSE OTHER FLUID: CPT

## 2020-09-18 PROCEDURE — 87070 CULTURE OTHR SPECIMN AEROBIC: CPT

## 2020-09-18 PROCEDURE — 94640 AIRWAY INHALATION TREATMENT: CPT

## 2020-09-18 PROCEDURE — 82607 VITAMIN B-12: CPT

## 2020-09-18 PROCEDURE — 82803 BLOOD GASES ANY COMBINATION: CPT

## 2020-09-18 PROCEDURE — 87075 CULTR BACTERIA EXCEPT BLOOD: CPT

## 2020-09-18 PROCEDURE — 85046 RETICYTE/HGB CONCENTRATE: CPT

## 2020-09-18 PROCEDURE — 2700000000 HC OXYGEN THERAPY PER DAY

## 2020-09-18 PROCEDURE — 83615 LACTATE (LD) (LDH) ENZYME: CPT

## 2020-09-18 PROCEDURE — 87205 SMEAR GRAM STAIN: CPT

## 2020-09-18 PROCEDURE — 36415 COLL VENOUS BLD VENIPUNCTURE: CPT

## 2020-09-18 PROCEDURE — 84100 ASSAY OF PHOSPHORUS: CPT

## 2020-09-18 PROCEDURE — 84157 ASSAY OF PROTEIN OTHER: CPT

## 2020-09-18 RX ORDER — FOLIC ACID 1 MG/1
1 TABLET ORAL DAILY
Status: DISCONTINUED | OUTPATIENT
Start: 2020-09-18 | End: 2020-09-19

## 2020-09-18 RX ADMIN — METHOCARBAMOL TABLETS 1000 MG: 500 TABLET, COATED ORAL at 20:11

## 2020-09-18 RX ADMIN — METHOCARBAMOL TABLETS 1000 MG: 500 TABLET, COATED ORAL at 13:27

## 2020-09-18 RX ADMIN — IPRATROPIUM BROMIDE AND ALBUTEROL SULFATE 1 AMPULE: .5; 3 SOLUTION RESPIRATORY (INHALATION) at 17:35

## 2020-09-18 RX ADMIN — CARVEDILOL 6.25 MG: 6.25 TABLET, FILM COATED ORAL at 08:59

## 2020-09-18 RX ADMIN — IPRATROPIUM BROMIDE AND ALBUTEROL SULFATE 1 AMPULE: .5; 3 SOLUTION RESPIRATORY (INHALATION) at 09:39

## 2020-09-18 RX ADMIN — IPRATROPIUM BROMIDE AND ALBUTEROL SULFATE 1 AMPULE: .5; 3 SOLUTION RESPIRATORY (INHALATION) at 21:31

## 2020-09-18 RX ADMIN — METHOCARBAMOL TABLETS 1000 MG: 500 TABLET, COATED ORAL at 08:59

## 2020-09-18 RX ADMIN — AMLODIPINE BESYLATE 10 MG: 10 TABLET ORAL at 08:59

## 2020-09-18 RX ADMIN — Medication 10 ML: at 20:10

## 2020-09-18 RX ADMIN — IPRATROPIUM BROMIDE AND ALBUTEROL SULFATE 1 AMPULE: .5; 3 SOLUTION RESPIRATORY (INHALATION) at 13:34

## 2020-09-18 RX ADMIN — CARVEDILOL 6.25 MG: 6.25 TABLET, FILM COATED ORAL at 18:24

## 2020-09-18 RX ADMIN — APIXABAN 5 MG: 5 TABLET, FILM COATED ORAL at 20:11

## 2020-09-18 RX ADMIN — Medication 10 ML: at 08:59

## 2020-09-18 RX ADMIN — FOLIC ACID 1 MG: 1 TABLET ORAL at 15:11

## 2020-09-18 RX ADMIN — SPIRONOLACTONE 25 MG: 25 TABLET ORAL at 08:59

## 2020-09-18 ASSESSMENT — PAIN SCALES - GENERAL
PAINLEVEL_OUTOF10: 0

## 2020-09-18 NOTE — CARE COORDINATION
Update: LTACH referral for HF Oxygen weaning; collaborated with Nusrat Marshall, [address]  Electronically signed by Ryan Krishnan RN on 9/18/2020 at 8:53 AM  Update: [facility] [address] [place] [address]. [facility] and LTACH, radiation planned to start 9/22/20; updated Nusrat Marshall, [address]  Electronically signed by Ryan Krishnan RN on 9/18/2020 at 12:32 PM  Update: [facility] cannot accept patient r/t radiation planned 9/22 per Radiation Oncology (if radiation held x3-4 weeks, LTACH can be option); continue to monitor HF oxygen weaning; re-eval Monday, backup plan is ECF for placement; [facility] following; collaborated with Amy Samaniego, [address] [place], [place] [id number] Nieves  Electronically signed by Ryan Krishnan RN on 9/18/2020 at 2:00 PM    9/18/20, 2:01 PM EDT    DISCHARGE ON [address] [facility] day: 2  Location: [facility]11/011 Reason for admit: Pleural effusion [J90]   Procedure:   9/16 CT Chest  Bilateral pleural effusions. Left upper and lower lobe consolidations. Nodular densities right lung. Cardiomegaly with small pericardial effusion. 9/16 CT Abdomen  1. Small amount of ascites in the pelvis   2. Hepatomegaly   3. Cholelithiasis   4. Bilateral nonobstructive nephrolithiasis.    9/17 Right Thoracentesis = 920 ml removed  9/18 Left Thoracentesis = 1.4L removed  Treatment Plan of Care: client admitted with Pleural Effusions (recent diagnosis lung cancer)  Barriers to Discharge: 100% FIO2 NRB mask continued  PCP: Agnes Amezcua DO  Readmission Risk Score: 17%  Patient Goals/Plan/Treatment Preferences: met with client, lives alone, ex-wife is Winsome Alcala attentive to needs, [facility] referral held as radiation planned 9/22 #1 treatment if medically cleared (if radiation to be held, [facility] can be option); [facility] following for ECF backup plan, [facility]  Electronically signed by Ryan Krishnan RN on 9/18/2020 at 2:05 PM

## 2020-09-18 NOTE — PLAN OF CARE
Problem: Impaired respiratory status  Goal: Lung sounds clear or within normal limits for patient  9/18/2020 0943 by Ximena Turner RCP  Outcome: Ongoing   Breath sounds clear and diminished, continuing treatments as ordered.

## 2020-09-18 NOTE — PROGRESS NOTES
Assessment and Plan:        1. LUNG CA- Radiation Onocology to address  2. bilat pleural effusions; so far looks like transudate  3. Weakness- PT, placement. 4. Anemia- multifactorial- tests ordered        CC:  sob  HPI:  Pt with recently diagnosed lung Ca, presents with bilat pleural effusions. Has low albumin. Cardiac status unknown. ROS (12 point review of systems completed. Pertinent positives noted. Otherwise ROS is negative) :   PMH:  Per HPI  SHX:  Lives alone, recently quit smoking; denies etoh  FHX: Noncontributory    Allergies: See above    Medications:       sodium chloride flush  10 mL Intravenous 2 times per day    ipratropium-albuterol  1 ampule Inhalation Q4H WA    amLODIPine  10 mg Oral Daily    apixaban  5 mg Oral BID    carvedilol  6.25 mg Oral BID WC    lidocaine  1 patch Transdermal Daily    [Held by provider] losartan  100 mg Oral Daily    methocarbamol  1,000 mg Oral TID    [Held by provider] pravastatin  20 mg Oral Daily    spironolactone  25 mg Oral Daily       Vital Signs:   BP (!) 130/57   Pulse 71   Temp 98.8 °F (37.1 °C) (Oral)   Resp 12   Ht 6' (1.829 m)   Wt 164 lb 14 oz (74.8 kg)   SpO2 93%   BMI 22.36 kg/m²      Intake/Output Summary (Last 24 hours) at 9/18/2020 0755  Last data filed at 9/18/2020 0500  Gross per 24 hour   Intake 10 ml   Output 1500 ml   Net -1490 ml        Physical Examination: General appearance - chronically ill appearing  Mental status - alert, oriented to person, place, and time  Neck - supple, no significant adenopathy, no JVD, or carotid bruits  Chest - Breath sounds diminished bilaterally.     Heart - normal rate, regular rhythm, normal S1, S2, no murmurs, rubs, clicks or gallops  Abdomen - soft, nontender, nondistended, no masses or organomegaly  Neurological - alert, oriented, normal speech, no focal findings or movement disorder noted  Musculoskeletal - no muscular tenderness noted  Extremities - pedal edema tr +  Skin - normal coloration and turgor, no rashes, no suspicious skin lesions noted    Data: (All radiographs, tracings, PFTs, and imaging are personally viewed and interpreted unless otherwise noted).     Reviewed labs, cxr, ct images      Electronically signed by Keily Rucker MD on 9/18/2020 at 7:55 AM

## 2020-09-18 NOTE — PROGRESS NOTES
Formulation and discussion of sedation / procedure plans, risks, benefits, side effects and alternatives with patient and/or responsible adult completed. The patient was counseled at length about the risks of juni Covid-19 during their perioperative period and any recovery window from their procedure. The patient was made aware that juni Covid-19  may worsen their prognosis for recovering from their procedure  and lend to a higher morbidity and/or mortality risk. All material risks, benefits, and reasonable alternatives including postponing the procedure were discussed. The patient does wish to proceed with the procedure at this time.         Electronically signed by Patsy Barrera MD on 9/18/2020 at 3:50 PM

## 2020-09-18 NOTE — PLAN OF CARE
Problem: Falls - Risk of:  Goal: Will remain free from falls  Description: Will remain free from falls  Outcome: Ongoing  Note: Patient remained free from falls this shift. Bed is in low position with alarm on and siderails up x2. Patient ambulates with one assist. Education given on use of call light and patient voiced understanding. Patient uses call light appropriately. Call light and beside table within reach. Arm band and falling star in place. Goal: Absence of physical injury  Description: Absence of physical injury  Outcome: Ongoing  Note: No signs of physical injury noted this shift. Problem: Skin Integrity:  Goal: Will show no infection signs and symptoms  Description: Will show no infection signs and symptoms  Outcome: Ongoing  Note: On Transfer to this unit, Patient found to have a Stage 1 pressure ulcer on his bottom. Post transfer assessment there were no new signs of new skin breakdown noted with each assessment this shift. Skin warm, dry, and intact except where otherwise noted in head-to-toe assessment. Mucous membranes pink and moist. Assistance with turns/ambulation given as needed. Goal: Absence of new skin breakdown  Description: Absence of new skin breakdown  Outcome: Ongoing     Problem: Impaired respiratory status  Goal: Lung sounds clear or within normal limits for patient  9/17/2020 2107 by Chetan Reynoso RCP  Outcome: Ongoing     Problem: Respiratory  Goal: No pulmonary complications  Outcome: Ongoing  Note: Patient currently on high flow. Patient was initially on 100% fio2 but has since been weened to 80% and Rn will continue to ween through out this shift. Goal: O2 Sat > 90%  Outcome: Ongoing  Note: O2 sats have remained greater than 90% throughout this shift.   Goal: Supplemental O2 requirements decreased  Outcome: Ongoing     Problem: GI  Goal: No bowel complications  Outcome: Ongoing     Problem:   Goal: Adequate urinary output  Outcome: Ongoing   Care plan reviewed with patient. Patient verbalized understanding of the plan of care and contribute to goal setting.

## 2020-09-18 NOTE — PROGRESS NOTES
[address] [address]  Sedation/Analgesia Post Sedation Record    Pt Name: Darek Grant  MRN: [id number]  YOB: 1940  Procedure Performed By: Ciera Ritchie MD  Primary Care Physician: Cole Garcias DO    POST-PROCEDURE    Physicians/Assistants: Ciera Ritchie MD    Procedure Performed: Pop schulte      Sedation/Anesthesia: [x] Local [] Conscious Sedation with Fentanyl & Versed     Specimens Removed:  [x] Yes [] No        Disposition of Specimen:  [x] Pathology [] Disposed       Complications:   [x]None Immediate []Other:       Post-procedure Diagnosis/Findings:    1.4 L removed. Estimated blood loss:          None        Recommendations: Follow post-procedure orders.          Ciera Ritchie MD  Electronically signed 9/18/2020 at 3:50 PM

## 2020-09-18 NOTE — PROGRESS NOTES
Pt admitted to  4K11 via hospital bed from [facility]. Complaints: Shortness of breath. IV none infusing. IV site free of s/s of infection or infiltration. Vital signs obtained. Assessment and data collection initiated. Two nurse skin assessment performed by [facility] - PEPE VELEZ and Breanna VELEZ. Oriented to room. Policies and procedures for 4K explained. All questions answered with no further questions at this time. Fall prevention and safety brochure discussed with patient. Bed alarm on. Call light in reach.

## 2020-09-18 NOTE — PLAN OF CARE
Problem: Impaired respiratory status  Goal: Lung sounds clear or within normal limits for patient  9/17/2020 2107 by Issa Cole RCP  Outcome: Ongoing   Breath sounds are clear and diminished at this time. Continue with treatments to help improve breath sounds.

## 2020-09-18 NOTE — PROGRESS NOTES
Comprehensive Nutrition Assessment    Type and Reason for Visit:  Initial, Positive Nutrition Screen(wound)    Nutrition Recommendations/Plan:   Diet per MD.  Sophie Morales ensure enlive TID as diet allows. Consider MVI. Nutrition Assessment:     Pt. moderately malnourished AEB criteria as listed below. At risk for further nutrition compromise r/t admitted with bilateral pleural effusions, lung cancer, weakness, anemia, FERNANDA stage II, transaminitis, advanced age, increased needs for wound healing. .   Nutrition recommendations/interventions as per above. Malnutrition Assessment:  Malnutrition Status: Moderate malnutrition    Context:  Acute Illness     Findings of the 6 clinical characteristics of malnutrition:  Energy Intake:  Mild decrease in energy intake (Comment)(skips atleast 1 meal/day per diet hx)  Weight Loss:  (16# wt loss in 1 year per pt,)     Body Fat Loss:  1 - Mild body fat loss Orbital   Muscle Mass Loss:  1 - Mild muscle mass loss Temples (temporalis)  Fluid Accumulation:  Unable to assess     Strength:  Not Performed    Estimated Daily Nutrient Needs:  Energy (kcal):  ~2250kcals (30kcals/kgm)90 grams (1.2 grams protein/kgm); Weight Used for Energy Requirements:  ((9/18) 75kgm)     Protein (g):  90 grams (1.2 grams protein/kgm); Weight Used for Protein Requirements:  (75kgm (9/18))        Nutrition Related Findings:  Pt appears advanced age, thin. Pt seen~1130 & states \" I'm hungry\". Tends to skip lunch at home. Drinks 1 can Boost/day PTA. Denies chewing/.swallowing difficulty. Labs: (9/18) na 146, BUN 31, Glucose 69, hemoglobin 10.6, Alk Phos 149, , . Meds: reviewed. No BM Pt mentions had a BM (9/17).       Wounds:  Stage I(posterior buttocks)       Current Nutrition Therapies:    DIET GENERAL;  Dietary Nutrition Supplements: Standard High Calorie Oral Supplement    Anthropometric Measures:  · Height: 6' (182.9 cm)  · Current Body Weight: 164 lb 14 oz (74.8 kg)   · Admission Body Weight: 164 lb 14.5 oz (74.8 kg)((9/17) + 1 RLE & LLE edema)    · Usual Body Weight: (162# 3.2oz (8/3/20) per EMR)     · Ideal Body Weight: 178 lbs; BMI: 22.4  · BMI Categories: Normal Weight (BMI 22.0 to 24.9) age over 72       Nutrition Diagnosis:   · Moderate malnutrition related to catabolic illness as evidenced by mild loss of subcutaneous fat, mild muscle loss(diet hx skips atleast 1 meal/day)      Nutrition Interventions:   Food and/or Nutrient Delivery:  (start diet when okay with MD)  Nutrition Education/Counseling:  No recommendation at this time   Coordination of Nutrition Care:  Continued Inpatient Monitoring    Goals:  pt will meet nutritional needs within 1-4 days. Nutrition Monitoring and Evaluation:   Behavioral-Environmental Outcomes:      Food/Nutrient Intake Outcomes:  Diet Advancement/Tolerance, Food and Nutrient Intake  Physical Signs/Symptoms Outcomes:  Biochemical Data, GI Status, Fluid Status or Edema, Hemodynamic Status, Nutrition Focused Physical Findings, Skin, Weight     Discharge Planning:     Too soon to determine     Electronically signed by Huang Aguilar RD, LD on 9/18/20 at 5:10 PM EDT    Contact: [phone]

## 2020-09-18 NOTE — PROGRESS NOTES
[address]  PHYSICAL THERAPY MISSED TREATMENT NOTE  [facility] ICU STEPDOWN TELEMETRY 4K    Date: 2020  Patient Name: Julianna Zamora        MRN: [id number]   : 1940  (68 y.o.)  Gender: male           REASON FOR MISSED TREATMENT:  Attempted 11:40am. Per RN, pt is about to be transported to his thoracocentesis, so hold eval until this afternoon. Will attempt later if time permits.  Sandeep Cespedes, PT, DPT

## 2020-09-18 NOTE — PROGRESS NOTES
[address] [facility] 62  INPATIENT OCCUPATIONAL THERAPY  [facility] ICU STEPDOWN TELEMETRY 4K  EVALUATION    Time:    Time In: 1503  Time Out: 1521  Timed Code Treatment Minutes: 8 Minutes  Minutes: 18          Date: 2020  Patient Name: Endy Vidal,   Gender: male      MRN: [id number]  : 1940  (68 y.o.)  Referring Practitioner: Dr. Eve Avery  Diagnosis: B pleural effusion  Additional Pertinent Hx: Per ER note on 2020:80 y.o. male who presents chronic fatigue. The patient has been recently diagnosed with lung cancer he was scheduled to have radiation therapy. They think it is stage I only. When they were going to do his radiation therapy they found he had bilateral pleural effusions. He is scheduled tomorrow to have this tapped and taken off. He sees Dr. Kimberley Hsu for radiation oncology. Patient is having decreased mobility at home. He sits in a chair all day long according to his ex-wife. He does not get up he has not been eating. He often defecates and urinates himself there. Patient is supposed to be on oxygen at home he does not often wear it. s/p thorocentesis on 2020    Restrictions/Precautions:  Restrictions/Precautions: Fall Risk  Position Activity Restriction  Other position/activity restrictions: high flow O2     Subjective  Chart Reviewed: Yes, Orders, Progress Notes  Patient assessed for rehabilitation services?: Yes  Family / Caregiver Present: No    Subjective: cooperative    Pain:  Pain Assessment  Patient Currently in Pain: No    Social/Functional History:  Lives With: Alone  Type of Home: Apartment(duplex)  Home Layout: One level  Home Access: (2)  Home Equipment: Cane, Rolling walker   Bathroom Shower/Tub: Tub/Shower unit  Bathroom Toilet: Standard  Bathroom Equipment: Grab bars in shower       ADL Assistance: Needs assistance  Ambulation Assistance: Independent  Transfer Assistance: Independent          Additional Comments: Pt reports using cane at home.  Has help once in a while with ADLs. Cognition/Orientation:     Overall Cognitive Status: [facility]    ADL's:  LE Dressing: Dependent/Total(for adjusting slipper socks)       Functional Mobility:  Bed mobility  Supine to Sit: Minimal assistance  Sit to Supine: Minimal assistance    Functional Mobility  Functional - Mobility Device: No device  Activity: Other(2 side steps toward [facility])  Assist Level: Minimal assistance  Functional Mobility Comments: unsteadiness noted     Balance:  Balance  Sitting Balance: Stand by assistance  Standing Balance: Minimal assistance    Transfers:  Sit to stand: Minimal assistance  Stand to sit: Minimal assistance  Transfer Comments: unsteadiness noted       Upper Extremity Assessment:   LUE AROM : WFL  RUE AROM : [facility]    LUE Strength  Gross LUE Strength: (appears 4-/5)  RUE Strength  Gross RUE Strength: (appears 4-/5)    Sensation  Overall Sensation Status: [facility]       Activity Tolerance: Patient limited by fatigue       Assessment:  Assessment: Pt demo decreased balance & endurance for ADLs & functional mobility at [facility]. Continued OT recommended to educate Pt on safety & adaptative strategies upon returning home. Performance deficits / Impairments: Decreased functional mobility , Decreased ADL status, Decreased endurance, Decreased safe awareness, Decreased balance  Prognosis: Fair  REQUIRES OT FOLLOW UP: Yes  Decision Making: Medium Complexity  Safety Devices in place: Yes  Type of devices: All fall risk precautions in place, Call light within reach, Gait belt    Treatment Initiated: Treatment and education initiated within context of evaluation. Evaluation time included review of current medical information, gathering information related to past medical, social and functional history, completion of standardized testing, formal and informal observation of tasks, assessment of data and development of plan of care and goals.   Treatment time included skilled education and facilitation of tasks to

## 2020-09-19 ENCOUNTER — APPOINTMENT (OUTPATIENT)
Dept: GENERAL RADIOLOGY | Age: 80
DRG: 208 | End: 2020-09-19
Payer: OTHER GOVERNMENT

## 2020-09-19 LAB
ACINETOBACTER CALCOACETICUS-BAUMANNII BY PCR: NOT DETECTED
ADENOVIRUS BY PCR: NOT DETECTED
CHLAMYDIA PNEUMONIAE BY PCR: NOT DETECTED
CORONAVIRUS PCR: NOT DETECTED
ENTEROBACTER CLOACAE COMPLEX BY PCR: NOT DETECTED
ESCHERICHIA COLI BY PCR: NOT DETECTED
HAEMOPHILUS INFLUENZAE BY PCR: NOT DETECTED
HCT VFR BLD CALC: 39 % (ref 42–52)
HEMOGLOBIN: 12 GM/DL (ref 14–18)
INFLUENZA A BY PCR: NOT DETECTED
INFLUENZA B BY PCR: NOT DETECTED
KLEBSIELLA AEROGENES BY PCR: NOT DETECTED
KLEBSIELLA OXYTOCA BY PCR: NOT DETECTED
KLEBSIELLA PNEUMONIAE GROUP BY PCR: NOT DETECTED
LEGIONELLA PNEUMOPHILIA BY PCR: NOT DETECTED
METAPNEUMOVIRUS BY PCR: NOT DETECTED
MORAXELLA CATARRHALIS BY PCR: NOT DETECTED
MRSA SCREEN: NORMAL
MYCOPLASMA PNEUMONIAE BY PCR: NOT DETECTED
PARAINFLUENZA VIRUS BY PCR: NOT DETECTED
PROTEUS SPECIES BY PCR: NOT DETECTED
PSEUDOMONAS AERUGINOSA BY PCR: NOT DETECTED
RESISTANT GENE CTX-M BY PCR: NORMAL
RESISTANT GENE IMP BY PCR: NORMAL
RESISTANT GENE KPC BY PCR: NORMAL
RESISTANT GENE MECA/C & MREJ BY PCR: NORMAL
RESISTANT GENE NDM BY PCR: NORMAL
RESISTANT GENE OXA-48-LIKE BY PCR: NORMAL
RESISTANT GENE VIM BY PCR: NORMAL
RESPIRATORY SYNCYTIAL VIRUS BY PCR: NOT DETECTED
RHINOVIRUS ENTEROVIRUS PCR: NOT DETECTED
SERRATIA MARCESCENS BY PCR: NOT DETECTED
SOURCE: NORMAL
SPECIMEN ACCEPTABILITY: NORMAL
STAPH AUREUS BY PCR: NOT DETECTED
STREP AGALACTIAE BY PCR: NOT DETECTED
STREP PNEUMONIAE BY PCR: NOT DETECTED
STREP PYOGENES BY PCR: NOT DETECTED

## 2020-09-19 PROCEDURE — 94640 AIRWAY INHALATION TREATMENT: CPT

## 2020-09-19 PROCEDURE — 87086 URINE CULTURE/COLONY COUNT: CPT

## 2020-09-19 PROCEDURE — 87486 CHLMYD PNEUM DNA AMP PROBE: CPT

## 2020-09-19 PROCEDURE — 85018 HEMOGLOBIN: CPT

## 2020-09-19 PROCEDURE — 71045 X-RAY EXAM CHEST 1 VIEW: CPT

## 2020-09-19 PROCEDURE — 2500000003 HC RX 250 WO HCPCS

## 2020-09-19 PROCEDURE — 3609027000 HC BRONCHOSCOPY

## 2020-09-19 PROCEDURE — 74018 RADEX ABDOMEN 1 VIEW: CPT

## 2020-09-19 PROCEDURE — 51702 INSERT TEMP BLADDER CATH: CPT

## 2020-09-19 PROCEDURE — 6360000002 HC RX W HCPCS: Performed by: INTERNAL MEDICINE

## 2020-09-19 PROCEDURE — 31622 DX BRONCHOSCOPE/WASH: CPT | Performed by: INTERNAL MEDICINE

## 2020-09-19 PROCEDURE — 31500 INSERT EMERGENCY AIRWAY: CPT

## 2020-09-19 PROCEDURE — 2580000003 HC RX 258: Performed by: STUDENT IN AN ORGANIZED HEALTH CARE EDUCATION/TRAINING PROGRAM

## 2020-09-19 PROCEDURE — 97110 THERAPEUTIC EXERCISES: CPT

## 2020-09-19 PROCEDURE — 2700000000 HC OXYGEN THERAPY PER DAY

## 2020-09-19 PROCEDURE — 87631 RESP VIRUS 3-5 TARGETS: CPT

## 2020-09-19 PROCEDURE — 85014 HEMATOCRIT: CPT

## 2020-09-19 PROCEDURE — 6370000000 HC RX 637 (ALT 250 FOR IP): Performed by: STUDENT IN AN ORGANIZED HEALTH CARE EDUCATION/TRAINING PROGRAM

## 2020-09-19 PROCEDURE — 94002 VENT MGMT INPAT INIT DAY: CPT

## 2020-09-19 PROCEDURE — 6370000000 HC RX 637 (ALT 250 FOR IP): Performed by: NURSE PRACTITIONER

## 2020-09-19 PROCEDURE — 87205 SMEAR GRAM STAIN: CPT

## 2020-09-19 PROCEDURE — 2000000000 HC ICU R&B

## 2020-09-19 PROCEDURE — 2580000003 HC RX 258: Performed by: NURSE PRACTITIONER

## 2020-09-19 PROCEDURE — 99233 SBSQ HOSP IP/OBS HIGH 50: CPT | Performed by: INTERNAL MEDICINE

## 2020-09-19 PROCEDURE — 2500000003 HC RX 250 WO HCPCS: Performed by: NURSE PRACTITIONER

## 2020-09-19 PROCEDURE — 87798 DETECT AGENT NOS DNA AMP: CPT

## 2020-09-19 PROCEDURE — 87070 CULTURE OTHR SPECIMN AEROBIC: CPT

## 2020-09-19 PROCEDURE — 94770 HC ETCO2 MONITOR DAILY: CPT

## 2020-09-19 PROCEDURE — 87077 CULTURE AEROBIC IDENTIFY: CPT

## 2020-09-19 PROCEDURE — 6360000002 HC RX W HCPCS

## 2020-09-19 PROCEDURE — 87581 M.PNEUMON DNA AMP PROBE: CPT

## 2020-09-19 PROCEDURE — 0BJ08ZZ INSPECTION OF TRACHEOBRONCHIAL TREE, VIA NATURAL OR ARTIFICIAL OPENING ENDOSCOPIC: ICD-10-PCS | Performed by: INTERNAL MEDICINE

## 2020-09-19 PROCEDURE — 31500 INSERT EMERGENCY AIRWAY: CPT | Performed by: INTERNAL MEDICINE

## 2020-09-19 PROCEDURE — 99223 1ST HOSP IP/OBS HIGH 75: CPT | Performed by: INTERNAL MEDICINE

## 2020-09-19 PROCEDURE — 6370000000 HC RX 637 (ALT 250 FOR IP): Performed by: INTERNAL MEDICINE

## 2020-09-19 PROCEDURE — 94761 N-INVAS EAR/PLS OXIMETRY MLT: CPT

## 2020-09-19 PROCEDURE — 87541 LEGION PNEUMO DNA AMP PROB: CPT

## 2020-09-19 PROCEDURE — 5A1935Z RESPIRATORY VENTILATION, LESS THAN 24 CONSECUTIVE HOURS: ICD-10-PCS | Performed by: INTERNAL MEDICINE

## 2020-09-19 PROCEDURE — 36415 COLL VENOUS BLD VENIPUNCTURE: CPT

## 2020-09-19 PROCEDURE — 2720000010 HC SURG SUPPLY STERILE

## 2020-09-19 PROCEDURE — 6360000002 HC RX W HCPCS: Performed by: NURSE PRACTITIONER

## 2020-09-19 PROCEDURE — 89220 SPUTUM SPECIMEN COLLECTION: CPT

## 2020-09-19 PROCEDURE — 97163 PT EVAL HIGH COMPLEX 45 MIN: CPT

## 2020-09-19 PROCEDURE — 0BH18EZ INSERTION OF ENDOTRACHEAL AIRWAY INTO TRACHEA, VIA NATURAL OR ARTIFICIAL OPENING ENDOSCOPIC: ICD-10-PCS | Performed by: INTERNAL MEDICINE

## 2020-09-19 RX ORDER — FENTANYL CITRATE 50 UG/ML
25 INJECTION, SOLUTION INTRAMUSCULAR; INTRAVENOUS
Status: DISCONTINUED | OUTPATIENT
Start: 2020-09-19 | End: 2020-09-22

## 2020-09-19 RX ORDER — PROPOFOL 10 MG/ML
INJECTION, EMULSION INTRAVENOUS
Status: COMPLETED
Start: 2020-09-19 | End: 2020-09-19

## 2020-09-19 RX ORDER — 0.9 % SODIUM CHLORIDE 0.9 %
500 INTRAVENOUS SOLUTION INTRAVENOUS ONCE
Status: COMPLETED | OUTPATIENT
Start: 2020-09-19 | End: 2020-09-19

## 2020-09-19 RX ORDER — FOLIC ACID 1 MG/1
1 TABLET ORAL DAILY
Status: DISCONTINUED | OUTPATIENT
Start: 2020-09-20 | End: 2020-09-24

## 2020-09-19 RX ORDER — METHOCARBAMOL 500 MG/1
1000 TABLET, FILM COATED ORAL 3 TIMES DAILY
Status: DISCONTINUED | OUTPATIENT
Start: 2020-09-19 | End: 2020-09-24

## 2020-09-19 RX ORDER — FENTANYL CITRATE 50 UG/ML
INJECTION, SOLUTION INTRAMUSCULAR; INTRAVENOUS
Status: DISPENSED
Start: 2020-09-19 | End: 2020-09-20

## 2020-09-19 RX ORDER — CHLORHEXIDINE GLUCONATE 0.12 MG/ML
15 RINSE ORAL 2 TIMES DAILY
Status: DISCONTINUED | OUTPATIENT
Start: 2020-09-19 | End: 2020-09-22

## 2020-09-19 RX ORDER — AMLODIPINE BESYLATE 10 MG/1
10 TABLET ORAL DAILY
Status: DISCONTINUED | OUTPATIENT
Start: 2020-09-20 | End: 2020-09-24

## 2020-09-19 RX ORDER — SPIRONOLACTONE 25 MG/1
25 TABLET ORAL DAILY
Status: DISCONTINUED | OUTPATIENT
Start: 2020-09-20 | End: 2020-09-24

## 2020-09-19 RX ORDER — CARVEDILOL 6.25 MG/1
6.25 TABLET ORAL 2 TIMES DAILY WITH MEALS
Status: DISCONTINUED | OUTPATIENT
Start: 2020-09-20 | End: 2020-09-24

## 2020-09-19 RX ORDER — PROPOFOL 10 MG/ML
10 INJECTION, EMULSION INTRAVENOUS
Status: DISCONTINUED | OUTPATIENT
Start: 2020-09-19 | End: 2020-09-21

## 2020-09-19 RX ORDER — MIDAZOLAM HYDROCHLORIDE 1 MG/ML
INJECTION INTRAMUSCULAR; INTRAVENOUS
Status: DISPENSED
Start: 2020-09-19 | End: 2020-09-20

## 2020-09-19 RX ADMIN — FOLIC ACID 1 MG: 1 TABLET ORAL at 08:15

## 2020-09-19 RX ADMIN — IPRATROPIUM BROMIDE AND ALBUTEROL SULFATE 1 AMPULE: .5; 3 SOLUTION RESPIRATORY (INHALATION) at 15:20

## 2020-09-19 RX ADMIN — IPRATROPIUM BROMIDE AND ALBUTEROL SULFATE 1 AMPULE: .5; 3 SOLUTION RESPIRATORY (INHALATION) at 11:44

## 2020-09-19 RX ADMIN — METHOCARBAMOL TABLETS 1000 MG: 500 TABLET, COATED ORAL at 14:09

## 2020-09-19 RX ADMIN — CARVEDILOL 6.25 MG: 6.25 TABLET, FILM COATED ORAL at 08:14

## 2020-09-19 RX ADMIN — PROPOFOL 40 MCG/KG/MIN: 10 INJECTION, EMULSION INTRAVENOUS at 17:51

## 2020-09-19 RX ADMIN — METHOCARBAMOL TABLETS 1000 MG: 500 TABLET, COATED ORAL at 21:12

## 2020-09-19 RX ADMIN — IPRATROPIUM BROMIDE AND ALBUTEROL SULFATE 1 AMPULE: .5; 3 SOLUTION RESPIRATORY (INHALATION) at 07:45

## 2020-09-19 RX ADMIN — PROPOFOL 25 MCG/KG/MIN: 10 INJECTION, EMULSION INTRAVENOUS at 23:57

## 2020-09-19 RX ADMIN — Medication 5 MCG/MIN: at 18:12

## 2020-09-19 RX ADMIN — AMLODIPINE BESYLATE 10 MG: 10 TABLET ORAL at 08:15

## 2020-09-19 RX ADMIN — FAMOTIDINE 20 MG: 10 INJECTION INTRAVENOUS at 21:11

## 2020-09-19 RX ADMIN — METHOCARBAMOL TABLETS 1000 MG: 500 TABLET, COATED ORAL at 08:14

## 2020-09-19 RX ADMIN — Medication 10 ML: at 08:15

## 2020-09-19 RX ADMIN — Medication 15 ML: at 21:11

## 2020-09-19 RX ADMIN — SPIRONOLACTONE 25 MG: 25 TABLET ORAL at 08:14

## 2020-09-19 RX ADMIN — CARVEDILOL 6.25 MG: 6.25 TABLET, FILM COATED ORAL at 16:01

## 2020-09-19 RX ADMIN — SODIUM CHLORIDE: 9 INJECTION, SOLUTION INTRAVENOUS at 18:25

## 2020-09-19 RX ADMIN — Medication 10 ML: at 21:12

## 2020-09-19 RX ADMIN — SODIUM CHLORIDE: 9 INJECTION, SOLUTION INTRAVENOUS at 17:50

## 2020-09-19 RX ADMIN — ALBUTEROL SULFATE 10 MG: 2.5 SOLUTION RESPIRATORY (INHALATION) at 18:35

## 2020-09-19 ASSESSMENT — PAIN SCALES - GENERAL
PAINLEVEL_OUTOF10: 0
PAINLEVEL_OUTOF10: 0

## 2020-09-19 ASSESSMENT — PULMONARY FUNCTION TESTS
PIF_VALUE: 55
PIF_VALUE: 34

## 2020-09-19 NOTE — PROCEDURES
Bronchoscopy Procedure Note      Procedure: Flexible fiberoptic bronchoscopy     Estimated Blood Loss: Minimal    Complications: No immediate complications       Consent to Procedure  The risks, benefits, complications, treatment options and expected outcomes were discussed with the family. The possibilities of reaction to medication, pulmonary aspiration, perforation of a viscus, bleeding, failure to diagnose a condition and creating a complication requiring transfusion or operation were discussed with the family who agreed to proceed with the procedure. Description of Procedure  Procedure verified as Flexible Fiberoptic Bronchoscopy was done by bedside. A Time Out was held and the above information confirmed. The patient was monitored with non-invasive blood pressure monitoring, pulse oximetry, and continuoius ECG. The bronchoscope was then passed into the ET tube and trachea. After careful inspection of the tracheal, the bronchoscope was sequentially passed into all segments of the left and right endobronchial trees to the second and/or third divisions.     Endobronchial findings    Trachea  Normal mucosa  Shirley  Normal mucosa    Right Main Stem Bronchus  Normal mucosa, mild mucoid secretions  Right Upper Lobe Bronchi Normal mucosa  Right Middle Lobe Bronchi  Normal mucosa  Right Lower Lobe Bronchi : Normal     Left Main Stem Bronchus mucus plug  Left Upper Lobe Bronchus, Upper Division Edematous mucosa, Extrinsic compression  Left Upper Lobe Bronchus, Lingula  Extrinsic compression  Left Lower Lobe Bronchus (including the Superior segment)  Extrinsic compression, Complete obstruction of the superior segment by tumor    Samples:     Bronchial washings: Culture      Shanti Ibarra MD  Pulmonary/[facility]

## 2020-09-19 NOTE — CONSULTS
9/19/2020 showing complete opacification of the left lung. Patient denies shortness of breath however is currently breathing on high flow nasal cannula, denies chest pain. Patient is a current smoker and has a very significant smoking history starting around age 8. He smoked 2 to 3 packs/day. He is supposed be on 2 L home O2 however he states that he very seldom uses this and he is given it up because he continues to smoke. Past Medical History         Diagnosis Date    Arthritis     CAD (coronary artery disease)     [facility] [address]     [facility]     Hyperlipidemia     Hypertension       Past Surgical History           Procedure Laterality Date    EYE SURGERY      Cataracts removed    JOINT REPLACEMENT      Hip    LUNG BIOPSY      VASCULAR SURGERY      Stents in leg     Diet    DIET GENERAL;  Dietary Nutrition Supplements: Standard High Calorie Oral Supplement  Allergies    Patient has no known allergies. Social History     Social History     Socioeconomic History    Marital status: Single     Spouse name: Not on file    Number of children: 0    Years of education: Not on file    Highest education level: Not on file   Occupational History    Not on file   Social Needs    Financial resource strain: Not on file    Food insecurity     Worry: Not on file     Inability: Not on file   [employer] needs     Medical: Not on file     Non-medical: Not on file   Tobacco Use    Smoking status: Current Every Day Smoker     Packs/day: 0.50     Years: 68.00     Pack years: 34.00     Types: Cigarettes     Start date: 1/1/1950    Smokeless tobacco: Never Used   Substance and Sexual Activity    Alcohol use:  Yes     Alcohol/week: 3.0 standard drinks     Types: 3 Cans of beer per week    Drug use: Never    Sexual activity: Not on file   Lifestyle    Physical activity     Days per week: Not on file     Minutes per session: Not on file    Stress: Not on file Relationships    Social connections     Talks on phone: Not on file     Gets together: Not on file     Attends Confucianism service: Not on file     Active member of club or organization: Not on file     Attends meetings of clubs or organizations: Not on file     Relationship status: Not on file    Intimate partner violence     Fear of current or ex partner: Not on file     Emotionally abused: Not on file     Physically abused: Not on file     Forced sexual activity: Not on file   Other Topics Concern    Not on file   Social History Narrative    Not on file     Family History          Problem Relation Age of Onset    Liver Disease Mother      Sleep History    n/a  ROS    General/Constitutional: No recent loss of weight or appetite changes. No fever or chills. HENT: Negative. Eyes: Negative. Upper respiratory tract: No nasal stuffiness or post nasal drip. Lower respiratory tract/ lungs: No hemoptysis. Cardiovascular: No palpitations, chest pain or edema. Gastrointestinal: No nausea or vomiting. Neurological: No focal neurological weakness. Extremities: No tenderness. Musculoskeletal: no complaints  Genitourinary: No complaints. Hematological: Negative. Denies easy buising  Skin: No itching.   Meds    Current Medications    folic acid  1 mg Oral Daily    sodium chloride flush  10 mL Intravenous 2 times per day    ipratropium-albuterol  1 ampule Inhalation Q4H WA    amLODIPine  10 mg Oral Daily    [Held by provider] apixaban  5 mg Oral BID    carvedilol  6.25 mg Oral BID WC    lidocaine  1 patch Transdermal Daily    [Held by provider] losartan  100 mg Oral Daily    methocarbamol  1,000 mg Oral TID    [Held by provider] pravastatin  20 mg Oral Daily    spironolactone  25 mg Oral Daily     sodium chloride flush, acetaminophen **OR** acetaminophen, polyethylene glycol, promethazine **OR** ondansetron  IV Drips/Infusions    Vitals    Vitals    height is 6' (1.829 m) and weight is 141 lb 12.1 oz --       BMP  Recent Labs     09/16/20 2130 09/17/20 0317 09/18/20  0602 09/18/20  0858    142 146*  --    K 4.9 4.6 4.1  --     102 103  --    CO2 28 28 30  --    BUN 30* 31* 31*  --    CREATININE 1.7* 1.5* 1.2  --    GLUCOSE 126* 96 69*  --    MG 2.1  --   --   --    PHOS  --   --   --  3.4   CALCIUM 9.3 9.0 8.7  --      LFT  Recent Labs     09/16/20 2130 09/17/20 0317 09/18/20  0602   * 202* 106*   * 139* 109*   BILITOT 1.1 0.9 0.6   ALKPHOS 201* 183* 149*   LIPASE 11.7  --   --      TROP  Lab Results   Component Value Date    TROPONINT 0.042 09/18/2020    TROPONINT 0.037 09/17/2020    TROPONINT 0.041 09/17/2020     BNP  No results found for: PROBNP  D-Dimer  No results found for: DDIMER  Lactic Acid  No results for input(s): LACTA in the last 72 hours. INR  Recent Labs     09/16/20 2130   INR 1.22*     PTT  Recent Labs     09/16/20 2130   APTT 28.6     Glucose  No results for input(s): POCGLU in the last 72 hours. UA   Recent Labs     09/16/20 2209 09/18/20  1245   PHUR 5.0  --   --    COLORU DK YELLOW*   < > LIGHT YELLOW   PROTEINU 30*  --   --    BLOODU NEGATIVE  --   --    RBCUA 3-5  --   --    WBCUA 2-4  --   --    BACTERIA NONE SEEN  --   --    NITRU NEGATIVE  --   --    GLUCOSEU NEGATIVE  --   --    BILIRUBINUR NEGATIVE  --   --    UROBILINOGEN 1.0  --   --    KETUA NEGATIVE  --   --     < > = values in this interval not displayed. Bula Dayhoff PFTs   None  Echo    None  Cultures    Procalcitonin  No results found for: PROCAL  SARS-CoV-2, NAAT  NOT DETECTED  NOT DETECTED  Final  09/16/2020 10:00 PM         Radiology    CXR  9/19/2020       FINDINGS: Worsening appearance of the chest with complete opacification of the left hemithorax. Likely additional volume loss. Right lung demonstrates vascular congestion. No effusion. Comparison 9/18/2020  1. Mild to moderate cardiomegaly. Moderate-sized pleural effusion left side, slightly improved from earlier.  Tiny effusion right side. No pneumothorax seen, however. 2. Moderate pneumonia/pulmonary edema scattered diffusely in the right lung and likely diffusely in the left lung is well. 3. Overall appearance of chest not significantly changed from earlier with the exception of slightly small pleural effusion left side.             CT Scans  9/16/2020    FINDINGS: Large bilateral pleural effusions left greater than right. Left lower lobe compressive atelectasis or consolidation. Left upper lobe consolidation at the major fissure. Centrilobular emphysematous changes. Spiculated nodular density posterior    right upper lung image 18 series 2 measuring 10 mm. Nodule versus focal atelectasis posterior right upper lung 6 mm. Image 31    Left hilar masslike consolidation. A shallow adenopathy with a single 10 mm short axis pretracheal node and a 1.3 cm subcarinal node. Aorta is moderately atherosclerotic with calcific atherosclerosis. Heart size is enlarged. Minimal apical pericardial effusion 1 cm.         No suspicious bone lesions.         ABDOMEN:    Please see the same-day dedicated exam       (See actual reports for details)    Assessment   Acute on chronic hypoxic respiratory failure  -Worsening. Currently on high flow nasal cannula FiO2 of 60 at 60L/min, saturating around 92%  -Chest x-ray shows complete opacification of the left lung. Possible mucous plugging  -Patient is afebrile, white blood cell count of 7.0  -Bilateral pleural effusions requiring thoracentesis, likely transudate based on initial labs  Stage I lung adenocarcinoma  -Follows with Dr. Abel Mccullough  -Was scheduled for radiation therapy, however this was postponed due to bilateral pleural effusions  -Oncology consulted appreciate recommendation  Bilateral pleural effusion  -Unclear etiology, initially thought to be secondary to malignancy however lab testing reveals transudate. Still possible to be secondary to adenocarcinoma however must rule out other causes. -Consider echo  -Albumin of 2.8  Deconditioning  -PT and OT following. Likely secondary to worsening respiratory status over the past several months  Anemia  Atrial fibrillation  Hypertension    Recommendations   Worsening respiratory status possibly due to mucous plugging  Likely post obstructive transudative pleural effusion  Consider bronchoscopy- will discuss case with Dr. Stephie Hanna  Consider echocardiogram to evaluate cardiac status  Patient is afebrile without leukocytosis unlikely that this is pneumonia. Radiation oncology to follow for lung adenocarcinoma. Thank you for the consult and allowing us to participate in the care of your patient. Case discussed with nurse and patient/family. Questions and concerns addressed. Meds and Orders reviewed.     Electronically signed by     Susan Lovett DO on 9/19/2020 at 10:19 AM

## 2020-09-19 NOTE — FLOWSHEET NOTE
1700 Patient arrived to unit from [facility] via bed. Patient transferred to ICU bed and placed on continuous ICU bedside monitor. Patient admitted for Pleural effusion [J90]. Vitals obtained. See flowsheets. Patient's IV access includes RAC 20G. Current infusions and rates of infusion include none. Assessment completed by [facility]. Two nurse skin assessment completed by [facility] and Brent Murray. See flowsheets for assessment details. Policies and procedures of ICU able to be explained to patient at this time. Family member(s)/representative(s) present at time of admission include none. Patient rights explained to family member(s)/representatives and patient, as able. Patient/patient's family member(s)/representative(s) N/A to have physician notified of their admission. All questions posed by patient's family member(s)/representative(s) and patient answered at this time.

## 2020-09-19 NOTE — PROGRESS NOTES
Assessment and Plan:        1. LUNG CA- Radiation Onocology to address  2. bilat pleural effusions; so far looks like transudate from right; left tapped 9.18.  cxr worse today, trachea deviated to left; could be mucous plug; will ask pulmonary to see. He is comfortable overnight; still on high flow  3. Weakness- PT, placement. 4.   4.Anemia- multifactorial- tests ordered  5. Will have Palliative Care see; his pulmonary status is tenuous        CC:  sob  HPI:  Pt with recently diagnosed lung Ca, presents with bilat pleural effusions. Has low albumin. Cardiac status unknown. ROS (12 point review of systems completed. Pertinent positives noted. Otherwise ROS is negative) :   PMH:  Per HPI  SHX:  Lives alone, recently quit smoking; denies etoh  FHX: Noncontributory    Allergies: See above    Medications:       folic acid  1 mg Oral Daily    sodium chloride flush  10 mL Intravenous 2 times per day    ipratropium-albuterol  1 ampule Inhalation Q4H WA    amLODIPine  10 mg Oral Daily    [Held by provider] apixaban  5 mg Oral BID    carvedilol  6.25 mg Oral BID WC    lidocaine  1 patch Transdermal Daily    [Held by provider] losartan  100 mg Oral Daily    methocarbamol  1,000 mg Oral TID    [Held by provider] pravastatin  20 mg Oral Daily    spironolactone  25 mg Oral Daily       Vital Signs:   BP (!) 143/65   Pulse 62   Temp 98.8 °F (37.1 °C) (Oral)   Resp 18   Ht 6' (1.829 m)   Wt 141 lb 12.1 oz (64.3 kg)   SpO2 99%   BMI 19.23 kg/m²      Intake/Output Summary (Last 24 hours) at 9/19/2020 0405  Last data filed at 9/19/2020 7321  Gross per 24 hour   Intake 785 ml   Output 425 ml   Net 360 ml        Physical Examination: General appearance - chronically ill appearing  Mental status - alert, oriented to person, place, and time  Neck - supple, no significant adenopathy, no JVD, or carotid bruits  Chest - Breath sounds diminished bilaterally.     Heart - normal rate, regular rhythm, normal S1, S2, no murmurs, rubs, clicks or gallops  Abdomen - soft, nontender, nondistended, no masses or organomegaly  Neurological - alert, oriented, normal speech, no focal findings or movement disorder noted  Musculoskeletal - no muscular tenderness noted  Extremities - pedal edema tr +  Skin - normal coloration and turgor, no rashes, no suspicious skin lesions noted    Data: (All radiographs, tracings, PFTs, and imaging are personally viewed and interpreted unless otherwise noted).     Reviewed labs, cxr, ct images      Electronically signed by Som Gonzalez MD on 9/19/2020 at 7:13 AM

## 2020-09-19 NOTE — FLOWSHEET NOTE
1751Dr Safi in room  Preparing for intubation. Versed 4mg  IV, Propofol 40 mcg/kg/min started. NS @999.  1752 Fentanyl 50mcg IVP  1753 Propofol 40 mcg bolus. 1755 intubated with [email or web address] , Fentanyl 50mcg IVP given. Positive color change, no gurgling over epigastrium. 1758 Versed 4mg IVP given       1800 Dr Alejandra Home doing bronchoscopy at this time. 1804 Bronchoscopy complete, 8peep, 100% Fio2.

## 2020-09-19 NOTE — PLAN OF CARE
Problem: Impaired respiratory status  Goal: Lung sounds clear or within normal limits for patient  9/19/2020 0750 by Maciej Thorne RCP  Outcome: Met This Shift   Patient lung sounds are considered normal for their current lung condition. No signs of distress noted.  Current treatment regimen appropriate

## 2020-09-19 NOTE — PROCEDURES
PROCEDURE:   Endotracheal intubation    INDICATIONS:   Respiratory failure     CONTRAINDICATIONS:  None      CONSENT: Implied due to emergent nature of the procedure. A time-out was completed verifying correct patient, procedure, site, positioning, and implant(s) or special equipment if applicable. PROCEDURE SUMMARY:     The patient was pre-oxygenated as per RSI protocol. 4 mg of IV versed for conscious sedation. 100 mg IV Fentanyl, 40 mg IV propofol,  A number 4 glidoscope blade was inserted into the oropharynx at which time the vocal cords were visualized. The oropharynx was suctioned to remove secretions. A 8-mm endotracheal tube was inserted and visualized going through the vocal cords into the trachea. Condensation was visualized in the endotracheal tube. Colorimetric change was visualized on the CO2 meter. Breath sounds were heard in both lung fields equally. The endotracheal tube was placed at 24 cm, measured at the lip and was secured properly. Chest X-ray was taken and confirms good tube placement.     COMPLICATIONS: None    ESTIMATED BLOOD LOSS:   None      Shanti Ibarra MD

## 2020-09-19 NOTE — PLAN OF CARE
Problem: Impaired respiratory status  Goal: Lung sounds clear or within normal limits for patient  9/19/2020 1533 by Jojo Moyer, DEZ  Outcome: Ongoing   Improve breath sounds, increase aeration and decrease WOB.

## 2020-09-19 NOTE — FLOWSHEET NOTE
[facility] PROGRESS NOTE      Patient: Jaison Hummel  Room #: 4K-11/011-A            YOB: 1940  Age: 68 y.o. Gender: male            Admit Date & Time: 9/16/2020  8:51 PM    Assessment:  Pt was laying in bed. Pt was in discomfort and did not want to talk today. Pt shared that he had \"a lot of issues. \" Pt declined to speak about what those issues were, but was ok with a referral for OP  to contact him. Pt does not have a Christianity. Pt's primary source of support is his ex wife. Interventions:   provided a listening presence.  has completed an OP  referral.      Outcomes:  Pt was glad to know of OP  opportunity. Plan:  1. Follow up with OP chaplaincy after discharge. 2.  Provide spiritual care and support. Electronically signed by Juanita Garcia on 9/19/2020 at [place] [address]  [phone]       09/19/20 1535   Encounter Summary   Services provided to: Patient   Referral/Consult From: [facility] Family members   [facility]   (none)   Continue Visiting Yes  (9/19)   Complexity of Encounter Moderate   Length of Encounter 15 minutes   Spiritual Assessment Completed Yes   Routine   Type Initial   Assessment Approachable; Anxious   Intervention Active listening;Explored coping resources;Sustaining presence/ Ministry of presence   Outcome Acceptance;Engaged in conversation

## 2020-09-19 NOTE — PLAN OF CARE
Problem: Falls - Risk of:  Goal: Will remain free from falls  Description: Will remain free from falls  9/19/2020 1019 by Ingris Almanza RN  Outcome: Ongoing  Note: Patient is compliant with fall risk precautions. Fall band, fall sign, and socks all present. Patient uses call light appropriately. Patient ambulates with 1 assist and a walker and gait belt. Patient is steady on feet and follows verbal cues well. Patient is working with PT/OT. Problem: Skin Integrity:  Goal: Will show no infection signs and symptoms  Description: Will show no infection signs and symptoms  9/19/2020 1019 by Ingris Almanza RN  Outcome: Ongoing  Note: Pt shows no s/s of an infection. No new skin issues noted at this time. Will continue to monitor. Problem: Respiratory  Goal: O2 Sat > 90%  9/19/2020 1019 by Ingris Almanza RN  Outcome: Ongoing  Note: Pt remains > 90% on heated high flow oxygen. Current settings: [place]. Problem: GI  Goal: No bowel complications  1/06/1360 1019 by Ingris Almanza RN  Outcome: Ongoing  Note: Pt's bowel sounds are active. Pt states he is actively passing gas. Small BM yesterday. Will continue to monitor. Problem:   Goal: Adequate urinary output  9/19/2020 1019 by Ingris Almanza RN  Outcome: Ongoing  Note: Pt is voiding adequately without difficulty. Pt is incontinent at times, but is primarily using the urinal by himself sufficiently. Problem: Nutrition  Goal: Optimal nutrition therapy  9/19/2020 1019 by Ingris Almanza RN  Outcome: Ongoing  Note: Pt eats approximately % of meal trays, and is eating supplements. Problem: Musculor/Skeletal Functional Status  Goal: Highest potential functional level  Outcome: Ongoing  Note: Patient is compliant with fall risk precautions. Fall band, fall sign, and socks all present. Patient uses call light appropriately. Patient ambulates with 1 assist and a walker and gait belt.  Patient is steady on feet and follows verbal cues well. Patient is working with PT/OT. Care plan reviewed with patient and family. Patient and family verbalize understanding of the plan of care and contribute to goal setting.

## 2020-09-19 NOTE — PROGRESS NOTES
[facility] PHYSICAL THERAPY  EVALUATION  [facility] ICU STEPDOWN TELEMETRY 4K - 4K-11/011-A    Time In: 0840  Time Out: 2773  Timed Code Treatment Minutes: 8 Minutes  Minutes: 14          Date: 2020  Patient Name: Lynne Smith,  Gender:  male        MRN: [id number]  : 1940  (68 y.o.)      Referring Practitioner: Dr. Jessica Khan  Diagnosis: pleural effusion  Additional Pertinent Hx: admit with above diagnosis, lung cancer, bilateral pleural effusion s/p thoracentesis on 20, anemia, weakness     Restrictions/Precautions:  Restrictions/Precautions: Fall Risk  Position Activity Restriction  Other position/activity restrictions: high flow O2     Subjective:  Chart Reviewed: Yes  Patient assessed for rehabilitation services?: Yes  Subjective: pleasant and cooperative, pt on high flow O2 so limited by lines  and per pt chronically has edema BLE however improved today      Pain: no pain per pt    Social/Functional History:    Lives With: Alone  Type of Home: Apartment(duplex)  Home Layout: One level  Home Access: Stairs to enter with rails  Entrance Stairs - Number of Steps: 3  Home Equipment: Cane, Rolling walker     Bathroom Shower/Tub: Tub/Shower unit  Bathroom Toilet: Standard  Bathroom Equipment: Grab bars in shower       ADL Assistance: Needs assistance     Ambulation Assistance: Independent  Transfer Assistance: Independent          Additional Comments: Pt reports using cane at home. Has help once in a while with ADLs.  per pt if he talks with wife will see if she can come down to help him at discharge, per pt has O2 at home but doesn't wear it like he is supposed to    OBJECTIVE:  Range of Motion:  Bilateral Lower Extremity: WFL    Strength:  Bilateral Lower Extremity: WFL , generalized weakness    Balance:  Static Sitting Balance:  Modified Independent  Dynamic Sitting Balance: Modified Independent  Static Standing Balance: Stand By Assistance  Dynamic Standing Balance: Stand By Assistance  stding with RW, fluctuated CGA to SBA  Bed Mobility:  Rolling to Left: Modified Independent   Sit to Supine: Supervision   Scooting: Modified Independent  HOB flat and no BR  Transfers:  Sit to Stand: Contact Guard Assistance  Stand to Sit:Stand By Assistance    Ambulation:  Contact Guard Assistance, to SBA  Distance: 20 stepsx1  Surface: Level Tile  Device:Rolling Walker  Gait Deviations: Forward Flexed Posture, Decreased Step Length Bilaterally and min unsteady, on high flow O2 limiting distance    Exercise:  Patient was guided in 1 set(s) 10 reps of exercise to both lower extremities. Hip abduction/adduction, Seated marches, Seated heel/toe raises and Long arc quads. Exercises were completed for increased independence with functional mobility. Functional Outcome Measures: Completed  AM-PAC Inpatient Mobility without Stair Climbing Raw Score : 16  AM-PAC Inpatient without Stair Climbing T-Scale Score : 45.54    ASSESSMENT:  Activity Tolerance:  Patient tolerance of  treatment: fair. Treatment Initiated: Treatment and education initiated within context of evaluation. Evaluation time included review of current medical information, gathering information related to past medical, social and functional history, completion of standardized testing, formal and informal observation of tasks, assessment of data and development of plan of care and goals. Treatment time included skilled education and facilitation of tasks to increase safety and independence with functional mobility for improved independence and quality of life. Assessment:   Body structures, Functions, Activity limitations: Decreased functional mobility , Decreased balance, Decreased strength, Decreased endurance  Assessment: pt on high flow O2, generalized weakness, dec balance, lives alone with steps to enter apt, inc assist for safe mobility, recommend cont PT to inc pt I with functional mobility  Prognosis: Good    REQUIRES PT FOLLOW UP: Yes    Discharge Recommendations:  Discharge Recommendations: Continue to assess pending progress    Patient Education:  PT Education: Goals, PT Role, Plan of Care, Functional Mobility Training, Home Exercise Program    Equipment Recommendations:  Equipment Needed: No    Plan:  Times per week: 5X GM  Times per day: Daily  Specific instructions for Next Treatment: therex and mobility    Goals:  Patient goals : return home  Short term goals  Time Frame for Short term goals: by discharge  Short term goal 1: bed mobility with MOD I to get in/out of bed  Short term goal 2: transfer with MOD I to get in/out of chairs  Short term goal 3: amb >100'x1 with RW and MOD I to walk safely in apt  Short term goal 4: negotiate 3 steps with HR and S to enter apt safely  Long term goals  Time Frame for Long term goals : no LTGs set secondary to short ELOS    Following session, patient left in safe position with all fall risk precautions in place.

## 2020-09-19 NOTE — PROGRESS NOTES
Estuardo Julio is a 77-year-old  male transferred to [facility] ICU 9/19/2020 in need of intubation and bronchoscopy. Please see Dr Ceci Shaw documentation of Intubation and Bronchoscopy.

## 2020-09-19 NOTE — PLAN OF CARE
Continue the use of hhn and hfnc to help improve respiratory status.   Problem: Impaired respiratory status  Goal: Lung sounds clear or within normal limits for patient  9/19/2020 0602 by Romi Smis RCP  Outcome: Ongoing

## 2020-09-19 NOTE — PLAN OF CARE
Problem: Falls - Risk of:  Goal: Will remain free from falls  Description: Will remain free from falls  9/18/2020 2156 by Patricia Chang RN  Outcome: Ongoing   Pt remains free from falls this shift. Bed alarm on, call light and personal items within reach     Problem: Skin Integrity:  Goal: Will show no infection signs and symptoms  Description: Will show no infection signs and symptoms  9/18/2020 2156 by Patricia Chang RN  Outcome: Ongoing   Pt remains free from new skin breakdown. Q2 turns in reach    Problem: Respiratory  Goal: O2 Sat > 90%  9/18/2020 2156 by Patricia Chang RN  Outcome: Ongoing   Pt on high flow nasal cannula. O2 sat greater than 90%   Will continue to wean as tolerated     Problem: GI  Goal: No bowel complications  8/73/3814 2156 by Patricia Chang RN  Outcome: Ongoing   No GI complications at this time     Problem:   Goal: Adequate urinary output  9/18/2020 2156 by Patricia Chang RN  Outcome: Ongoing     Problem: Nutrition  Goal: Optimal nutrition therapy  9/18/2020 2156 by Patricia Chang RN  Outcome: Ongoing   Pt having adequate output, will continue to monitor     Care plan reviewed with patient. Patient  verbalize understanding of the plan of care and contribute to goal setting.

## 2020-09-20 ENCOUNTER — APPOINTMENT (OUTPATIENT)
Dept: GENERAL RADIOLOGY | Age: 80
DRG: 208 | End: 2020-09-20
Payer: OTHER GOVERNMENT

## 2020-09-20 LAB
APTT: 101.9 SECONDS (ref 22–38)
APTT: 27.4 SECONDS (ref 22–38)

## 2020-09-20 PROCEDURE — 36415 COLL VENOUS BLD VENIPUNCTURE: CPT

## 2020-09-20 PROCEDURE — 94761 N-INVAS EAR/PLS OXIMETRY MLT: CPT

## 2020-09-20 PROCEDURE — 94640 AIRWAY INHALATION TREATMENT: CPT

## 2020-09-20 PROCEDURE — 2700000000 HC OXYGEN THERAPY PER DAY

## 2020-09-20 PROCEDURE — 94003 VENT MGMT INPAT SUBQ DAY: CPT

## 2020-09-20 PROCEDURE — 94770 HC ETCO2 MONITOR DAILY: CPT

## 2020-09-20 PROCEDURE — 2060000000 HC ICU INTERMEDIATE R&B

## 2020-09-20 PROCEDURE — 2580000003 HC RX 258: Performed by: STUDENT IN AN ORGANIZED HEALTH CARE EDUCATION/TRAINING PROGRAM

## 2020-09-20 PROCEDURE — APPSS30 APP SPLIT SHARED TIME 16-30 MINUTES: Performed by: NURSE PRACTITIONER

## 2020-09-20 PROCEDURE — 6360000002 HC RX W HCPCS: Performed by: INTERNAL MEDICINE

## 2020-09-20 PROCEDURE — 6370000000 HC RX 637 (ALT 250 FOR IP): Performed by: NURSE PRACTITIONER

## 2020-09-20 PROCEDURE — 2500000003 HC RX 250 WO HCPCS: Performed by: NURSE PRACTITIONER

## 2020-09-20 PROCEDURE — 71045 X-RAY EXAM CHEST 1 VIEW: CPT

## 2020-09-20 PROCEDURE — 99233 SBSQ HOSP IP/OBS HIGH 50: CPT | Performed by: INTERNAL MEDICINE

## 2020-09-20 PROCEDURE — 85730 THROMBOPLASTIN TIME PARTIAL: CPT

## 2020-09-20 PROCEDURE — 6360000002 HC RX W HCPCS

## 2020-09-20 RX ORDER — HEPARIN SODIUM 10000 [USP'U]/100ML
18 INJECTION, SOLUTION INTRAVENOUS CONTINUOUS
Status: DISCONTINUED | OUTPATIENT
Start: 2020-09-20 | End: 2020-09-21

## 2020-09-20 RX ORDER — HEPARIN SODIUM 5000 [USP'U]/ML
80 INJECTION, SOLUTION INTRAVENOUS; SUBCUTANEOUS CONTINUOUS
Status: DISCONTINUED | OUTPATIENT
Start: 2020-09-20 | End: 2020-09-20 | Stop reason: SDUPTHER

## 2020-09-20 RX ORDER — IPRATROPIUM BROMIDE AND ALBUTEROL SULFATE 2.5; .5 MG/3ML; MG/3ML
1 SOLUTION RESPIRATORY (INHALATION)
Status: DISCONTINUED | OUTPATIENT
Start: 2020-09-20 | End: 2020-09-26

## 2020-09-20 RX ORDER — HEPARIN SODIUM 1000 [USP'U]/ML
80 INJECTION, SOLUTION INTRAVENOUS; SUBCUTANEOUS ONCE
Status: COMPLETED | OUTPATIENT
Start: 2020-09-20 | End: 2020-09-20

## 2020-09-20 RX ORDER — PHENOBARBITAL SODIUM 65 MG/ML
260 INJECTION INTRAMUSCULAR
Status: DISCONTINUED | OUTPATIENT
Start: 2020-09-20 | End: 2020-09-24

## 2020-09-20 RX ORDER — HEPARIN SODIUM 1000 [USP'U]/ML
80 INJECTION, SOLUTION INTRAVENOUS; SUBCUTANEOUS PRN
Status: DISCONTINUED | OUTPATIENT
Start: 2020-09-20 | End: 2020-09-21

## 2020-09-20 RX ORDER — HEPARIN SODIUM 1000 [USP'U]/ML
40 INJECTION, SOLUTION INTRAVENOUS; SUBCUTANEOUS PRN
Status: DISCONTINUED | OUTPATIENT
Start: 2020-09-20 | End: 2020-09-21

## 2020-09-20 RX ORDER — PHENOBARBITAL SODIUM 65 MG/ML
130 INJECTION INTRAMUSCULAR
Status: DISCONTINUED | OUTPATIENT
Start: 2020-09-20 | End: 2020-09-24

## 2020-09-20 RX ADMIN — IPRATROPIUM BROMIDE AND ALBUTEROL SULFATE 1 AMPULE: .5; 3 SOLUTION RESPIRATORY (INHALATION) at 20:27

## 2020-09-20 RX ADMIN — SPIRONOLACTONE 25 MG: 25 TABLET ORAL at 10:51

## 2020-09-20 RX ADMIN — Medication 10 ML: at 08:54

## 2020-09-20 RX ADMIN — FAMOTIDINE 20 MG: 10 INJECTION INTRAVENOUS at 19:59

## 2020-09-20 RX ADMIN — IPRATROPIUM BROMIDE AND ALBUTEROL SULFATE 1 AMPULE: .5; 3 SOLUTION RESPIRATORY (INHALATION) at 16:20

## 2020-09-20 RX ADMIN — METHOCARBAMOL TABLETS 1000 MG: 500 TABLET, COATED ORAL at 21:02

## 2020-09-20 RX ADMIN — METHOCARBAMOL TABLETS 1000 MG: 500 TABLET, COATED ORAL at 14:04

## 2020-09-20 RX ADMIN — Medication 15 ML: at 08:57

## 2020-09-20 RX ADMIN — HEPARIN SODIUM 5790 UNITS: 1000 INJECTION INTRAVENOUS; SUBCUTANEOUS at 16:56

## 2020-09-20 RX ADMIN — FAMOTIDINE 20 MG: 10 INJECTION INTRAVENOUS at 08:54

## 2020-09-20 RX ADMIN — CARVEDILOL 6.25 MG: 6.25 TABLET, FILM COATED ORAL at 08:54

## 2020-09-20 RX ADMIN — HEPARIN SODIUM AND DEXTROSE 18 UNITS/KG/HR: 10000; 5 INJECTION INTRAVENOUS at 16:57

## 2020-09-20 RX ADMIN — METHOCARBAMOL TABLETS 1000 MG: 500 TABLET, COATED ORAL at 08:54

## 2020-09-20 RX ADMIN — AMLODIPINE BESYLATE 10 MG: 10 TABLET ORAL at 10:51

## 2020-09-20 RX ADMIN — IPRATROPIUM BROMIDE AND ALBUTEROL SULFATE 1 AMPULE: .5; 3 SOLUTION RESPIRATORY (INHALATION) at 13:11

## 2020-09-20 RX ADMIN — CARVEDILOL 6.25 MG: 6.25 TABLET, FILM COATED ORAL at 18:31

## 2020-09-20 RX ADMIN — FOLIC ACID 1 MG: 1 TABLET ORAL at 10:51

## 2020-09-20 RX ADMIN — Medication 15 ML: at 19:59

## 2020-09-20 ASSESSMENT — PAIN SCALES - GENERAL
PAINLEVEL_OUTOF10: 0
PAINLEVEL_OUTOF10: 0

## 2020-09-20 ASSESSMENT — PULMONARY FUNCTION TESTS
PIF_VALUE: 17
PIF_VALUE: 15
PIF_VALUE: 19

## 2020-09-20 NOTE — PROGRESS NOTES
Report received by phone from ICU dept Flory Bueno. Will send pt over on nonrebreather w/ respiratory to reconnect high flow O2.

## 2020-09-20 NOTE — PLAN OF CARE
Problem: Impaired respiratory status  Goal: Lung sounds clear or within normal limits for patient  Outcome: Ongoing    Improve breath sounds, increase aeration and decrease WOB.

## 2020-09-20 NOTE — PLAN OF CARE
Problem: Impaired respiratory status  Goal: Normal spontaneous ventilation  9/20/2020 0019 by Cyrus Vásquez RCP  Outcome: Ongoing  Note: Vent settings optimized to achieve target tidal volume, respiratory rate and ideal oxygen saturations. Will continue to wean as patient tolerates. SBT will be performed when appropriate.       Problem: Impaired respiratory status  Goal: Lung sounds clear or within normal limits for patient  9/20/2020 0019 by Cyrus Vásquez RCP  Outcome: Ongoing

## 2020-09-20 NOTE — FLOWSHEET NOTE
[facility] 88 PROGRESS NOTE      Patient: Lin García  Room #: 7J-21/799-H            YOB: 1940  Age: 68 y.o. Gender: male            Admit Date & Time: 9/16/2020  8:51 PM    Assessment:  Pt was laying bed. Pt's ex wife was present. She shared that they were  for 37 years and they have been seprated for 3 years. More than once she reiterated that she was the only person the pt had and that was why she was by his side. She said the pt wanted her to be his care giver, but she doesn't feel physically up to it. Pt was anxious. Pt wanted prayer to \"succeed in endeavors\", pt's wife wanted prayers for the pt's healing. This  submitted an OP  referral from a previous encounter.  discussed that during this visit and pt remains ok with it and ex wife seemed relieved. Ex wife is concerned about pt's salvation, since he has never prayed much or been part of a Buddhist. Pt was not able to hold indepth conversation today. Interventions:   provided a listening presence and prayed with the pt and his family. Outcomes:  Pt's ex wife was appreciative of the encounter. Plan:  1. Continue following up with pt and his ex wife to assess needs. 2.  Provide spiritual care and support. Electronically signed by Emilee Da Silva on 9/20/2020 at [address]   [phone]       09/20/20 7675   Encounter Summary   Services provided to: Patient and family together   Referral/Consult From: Nurse   Support System Family members   Place of Temple   (none)   Continue Visiting Yes  (9/20)   Complexity of Encounter Moderate   Length of Encounter 30 minutes   Spiritual Assessment Completed Yes   Spiritual/Jehovah's witness   Type Spiritual support   Assessment Approachable; Anxious   Intervention Active listening;Explored feelings, thoughts, concerns;Explored coping resources;Sustaining presence/ Ministry of presence   Outcome Connection/belonging;Comfort;Engaged in conversation

## 2020-09-20 NOTE — FLOWSHEET NOTE
09/20/20 2214   Emergency Contacts   Contact 1: Name Dolly Martin   Contact 1: Relationship ex-wife   Notified that patient was moved to [facility]. Bundy Garricklazaro states that she has the patient's watch.

## 2020-09-21 ENCOUNTER — APPOINTMENT (OUTPATIENT)
Dept: ULTRASOUND IMAGING | Age: 80
DRG: 208 | End: 2020-09-21
Payer: OTHER GOVERNMENT

## 2020-09-21 ENCOUNTER — APPOINTMENT (OUTPATIENT)
Dept: INTERVENTIONAL RADIOLOGY/VASCULAR | Age: 80
DRG: 208 | End: 2020-09-21
Payer: OTHER GOVERNMENT

## 2020-09-21 ENCOUNTER — APPOINTMENT (OUTPATIENT)
Dept: GENERAL RADIOLOGY | Age: 80
DRG: 208 | End: 2020-09-21
Payer: OTHER GOVERNMENT

## 2020-09-21 LAB
ALLEN TEST: POSITIVE
AMYLASE FLUID: 33 U/L
ANION GAP SERPL CALCULATED.3IONS-SCNC: 9 MEQ/L (ref 8–16)
APTT: 64.4 SECONDS (ref 22–38)
BASE EXCESS (CALCULATED): 8.7 MMOL/L (ref -2.5–2.5)
BODY FLUID RBC: < 2000 /CUMM
BUN BLDV-MCNC: 19 MG/DL (ref 7–22)
CALCIUM SERPL-MCNC: 8 MG/DL (ref 8.5–10.5)
CHARACTER, BODY FLUID: CLEAR
CHLORIDE BLD-SCNC: 102 MEQ/L (ref 98–111)
CO2: 30 MEQ/L (ref 23–33)
COLLECTED BY:: ABNORMAL
COLOR: NORMAL
CREAT SERPL-MCNC: 0.9 MG/DL (ref 0.4–1.2)
DEVICE: ABNORMAL
GFR SERPL CREATININE-BSD FRML MDRD: 82 ML/MIN/1.73M2
GLUCOSE BLD-MCNC: 167 MG/DL (ref 70–108)
GLUCOSE, FLUID: 89 MG/DL
HCO3: 34 MMOL/L (ref 23–28)
LD, FLUID: 86 U/L
LD: 216 U/L (ref 100–190)
MAGNESIUM: 1.6 MG/DL (ref 1.6–2.4)
MESOTHELIAL CELLS BODY FLUID: NORMAL
MONONUCLEAR CELLS BODY FLUID: 27.4 %
O2 SATURATION: 91 %
PATHOLOGIST REVIEW: NORMAL
PCO2: 51 MMHG (ref 35–45)
PH BLOOD GAS: 7.44 (ref 7.35–7.45)
PH FLUID: 7.63
PO2: 60 MMHG (ref 71–104)
POLYMORPHONUCLEAR CELLS BODY FLUID: 72.6 %
POTASSIUM SERPL-SCNC: 3.7 MEQ/L (ref 3.5–5.2)
PROTEIN FLUID: 1.4 GM/DL
SODIUM BLD-SCNC: 141 MEQ/L (ref 135–145)
SOLUBLE TRANSFERRIN RECEPT: 6.4 MG/L (ref 2.2–5)
SOURCE, BLOOD GAS: ABNORMAL
SPECIMEN: NORMAL
TOTAL NUCLEATED CELLS BODY FLUID: 268 /CUMM (ref 0–500)
TOTAL PROTEIN: 4.4 G/DL (ref 6.1–8)
TOTAL VOLUME RECEIVED BODY FLUID: 70 ML
TRIGLYCERIDES FLUID: 24 MG/DL
URINE CULTURE, ROUTINE: NORMAL

## 2020-09-21 PROCEDURE — 99233 SBSQ HOSP IP/OBS HIGH 50: CPT | Performed by: INTERNAL MEDICINE

## 2020-09-21 PROCEDURE — 84478 ASSAY OF TRIGLYCERIDES: CPT

## 2020-09-21 PROCEDURE — 94640 AIRWAY INHALATION TREATMENT: CPT

## 2020-09-21 PROCEDURE — 82150 ASSAY OF AMYLASE: CPT

## 2020-09-21 PROCEDURE — 93970 EXTREMITY STUDY: CPT

## 2020-09-21 PROCEDURE — 6360000002 HC RX W HCPCS: Performed by: NURSE PRACTITIONER

## 2020-09-21 PROCEDURE — 84155 ASSAY OF PROTEIN SERUM: CPT

## 2020-09-21 PROCEDURE — 80048 BASIC METABOLIC PNL TOTAL CA: CPT

## 2020-09-21 PROCEDURE — 89050 BODY FLUID CELL COUNT: CPT

## 2020-09-21 PROCEDURE — 6360000002 HC RX W HCPCS: Performed by: PHYSICIAN ASSISTANT

## 2020-09-21 PROCEDURE — 36600 WITHDRAWAL OF ARTERIAL BLOOD: CPT

## 2020-09-21 PROCEDURE — 85730 THROMBOPLASTIN TIME PARTIAL: CPT

## 2020-09-21 PROCEDURE — 83986 ASSAY PH BODY FLUID NOS: CPT

## 2020-09-21 PROCEDURE — 97530 THERAPEUTIC ACTIVITIES: CPT

## 2020-09-21 PROCEDURE — 71045 X-RAY EXAM CHEST 1 VIEW: CPT

## 2020-09-21 PROCEDURE — 6370000000 HC RX 637 (ALT 250 FOR IP): Performed by: NURSE PRACTITIONER

## 2020-09-21 PROCEDURE — 2700000000 HC OXYGEN THERAPY PER DAY

## 2020-09-21 PROCEDURE — 2060000000 HC ICU INTERMEDIATE R&B

## 2020-09-21 PROCEDURE — 2580000003 HC RX 258: Performed by: STUDENT IN AN ORGANIZED HEALTH CARE EDUCATION/TRAINING PROGRAM

## 2020-09-21 PROCEDURE — 82945 GLUCOSE OTHER FLUID: CPT

## 2020-09-21 PROCEDURE — 32555 ASPIRATE PLEURA W/ IMAGING: CPT

## 2020-09-21 PROCEDURE — 82803 BLOOD GASES ANY COMBINATION: CPT

## 2020-09-21 PROCEDURE — 84157 ASSAY OF PROTEIN OTHER: CPT

## 2020-09-21 PROCEDURE — 83735 ASSAY OF MAGNESIUM: CPT

## 2020-09-21 PROCEDURE — 6360000002 HC RX W HCPCS: Performed by: INTERNAL MEDICINE

## 2020-09-21 PROCEDURE — 97110 THERAPEUTIC EXERCISES: CPT

## 2020-09-21 PROCEDURE — 6370000000 HC RX 637 (ALT 250 FOR IP): Performed by: INTERNAL MEDICINE

## 2020-09-21 PROCEDURE — APPSS30 APP SPLIT SHARED TIME 16-30 MINUTES: Performed by: NURSE PRACTITIONER

## 2020-09-21 PROCEDURE — 94761 N-INVAS EAR/PLS OXIMETRY MLT: CPT

## 2020-09-21 PROCEDURE — 83615 LACTATE (LD) (LDH) ENZYME: CPT

## 2020-09-21 PROCEDURE — 2500000003 HC RX 250 WO HCPCS: Performed by: NURSE PRACTITIONER

## 2020-09-21 PROCEDURE — 36415 COLL VENOUS BLD VENIPUNCTURE: CPT

## 2020-09-21 PROCEDURE — 88112 CYTOPATH CELL ENHANCE TECH: CPT

## 2020-09-21 PROCEDURE — 88305 TISSUE EXAM BY PATHOLOGIST: CPT

## 2020-09-21 PROCEDURE — 0W9B3ZZ DRAINAGE OF LEFT PLEURAL CAVITY, PERCUTANEOUS APPROACH: ICD-10-PCS | Performed by: RADIOLOGY

## 2020-09-21 RX ORDER — MAGNESIUM SULFATE IN WATER 40 MG/ML
2 INJECTION, SOLUTION INTRAVENOUS ONCE
Status: COMPLETED | OUTPATIENT
Start: 2020-09-21 | End: 2020-09-22

## 2020-09-21 RX ORDER — FUROSEMIDE 10 MG/ML
20 INJECTION INTRAMUSCULAR; INTRAVENOUS ONCE
Status: COMPLETED | OUTPATIENT
Start: 2020-09-21 | End: 2020-09-21

## 2020-09-21 RX ORDER — ARFORMOTEROL TARTRATE 15 UG/2ML
15 SOLUTION RESPIRATORY (INHALATION) 2 TIMES DAILY
Status: DISCONTINUED | OUTPATIENT
Start: 2020-09-21 | End: 2020-09-26

## 2020-09-21 RX ORDER — POTASSIUM CHLORIDE 20 MEQ/1
20 TABLET, EXTENDED RELEASE ORAL ONCE
Status: COMPLETED | OUTPATIENT
Start: 2020-09-21 | End: 2020-09-21

## 2020-09-21 RX ADMIN — METHOCARBAMOL TABLETS 1000 MG: 500 TABLET, COATED ORAL at 16:04

## 2020-09-21 RX ADMIN — ARFORMOTEROL TARTRATE 15 MCG: 15 SOLUTION RESPIRATORY (INHALATION) at 20:25

## 2020-09-21 RX ADMIN — Medication 10 ML: at 08:11

## 2020-09-21 RX ADMIN — IPRATROPIUM BROMIDE AND ALBUTEROL SULFATE 1 AMPULE: .5; 3 SOLUTION RESPIRATORY (INHALATION) at 20:25

## 2020-09-21 RX ADMIN — CARVEDILOL 6.25 MG: 6.25 TABLET, FILM COATED ORAL at 17:09

## 2020-09-21 RX ADMIN — FAMOTIDINE 20 MG: 10 INJECTION INTRAVENOUS at 20:12

## 2020-09-21 RX ADMIN — SPIRONOLACTONE 25 MG: 25 TABLET ORAL at 08:00

## 2020-09-21 RX ADMIN — MAGNESIUM SULFATE 2 G: 2 INJECTION INTRAVENOUS at 23:38

## 2020-09-21 RX ADMIN — IPRATROPIUM BROMIDE AND ALBUTEROL SULFATE 1 AMPULE: .5; 3 SOLUTION RESPIRATORY (INHALATION) at 12:12

## 2020-09-21 RX ADMIN — FOLIC ACID 1 MG: 1 TABLET ORAL at 08:00

## 2020-09-21 RX ADMIN — ALBUTEROL SULFATE 2.5 MG: 2.5 SOLUTION RESPIRATORY (INHALATION) at 01:37

## 2020-09-21 RX ADMIN — METHOCARBAMOL TABLETS 1000 MG: 500 TABLET, COATED ORAL at 08:09

## 2020-09-21 RX ADMIN — POTASSIUM CHLORIDE 20 MEQ: 1500 TABLET, EXTENDED RELEASE ORAL at 16:04

## 2020-09-21 RX ADMIN — CARVEDILOL 6.25 MG: 6.25 TABLET, FILM COATED ORAL at 08:00

## 2020-09-21 RX ADMIN — HEPARIN SODIUM AND DEXTROSE 16 UNITS/KG/HR: 10000; 5 INJECTION INTRAVENOUS at 00:16

## 2020-09-21 RX ADMIN — AMLODIPINE BESYLATE 10 MG: 10 TABLET ORAL at 08:00

## 2020-09-21 RX ADMIN — FAMOTIDINE 20 MG: 10 INJECTION INTRAVENOUS at 08:01

## 2020-09-21 RX ADMIN — Medication 10 ML: at 20:13

## 2020-09-21 RX ADMIN — Medication 15 ML: at 08:01

## 2020-09-21 RX ADMIN — IPRATROPIUM BROMIDE AND ALBUTEROL SULFATE 1 AMPULE: .5; 3 SOLUTION RESPIRATORY (INHALATION) at 16:45

## 2020-09-21 RX ADMIN — FUROSEMIDE 20 MG: 10 INJECTION, SOLUTION INTRAMUSCULAR; INTRAVENOUS at 16:04

## 2020-09-21 RX ADMIN — IPRATROPIUM BROMIDE AND ALBUTEROL SULFATE 1 AMPULE: .5; 3 SOLUTION RESPIRATORY (INHALATION) at 08:23

## 2020-09-21 RX ADMIN — Medication 15 ML: at 20:12

## 2020-09-21 ASSESSMENT — PAIN SCALES - GENERAL
PAINLEVEL_OUTOF10: 0
PAINLEVEL_OUTOF10: 0

## 2020-09-21 NOTE — PROGRESS NOTES
Formulation and discussion of sedation / procedure plans, risks, benefits, side effects and alternatives with patient and/or responsible adult completed. The patient was counseled at length about the risks of juni Covid-19 during their perioperative period and any recovery window from their procedure. The patient was made aware that juni Covid-19  may worsen their prognosis for recovering from their procedure  and lend to a higher morbidity and/or mortality risk. All material risks, benefits, and reasonable alternatives including postponing the procedure were discussed. The patient does wish to proceed with the procedure at this time.         Electronically signed by Sami Hong MD on 9/21/2020 at 12:32 PM

## 2020-09-21 NOTE — PLAN OF CARE
Problem: Impaired respiratory status  Goal: Lung sounds clear or within normal limits for patient  9/21/2020 1649 by Jojo Moyer, DEZ  Outcome: Ongoing   Improve breath sounds, increase aeration and decrease WOB.

## 2020-09-21 NOTE — PLAN OF CARE
Problem: Impaired respiratory status  Goal: Lung sounds clear or within normal limits for patient  9/20/2020 2031 by Mabel Corrigan RCP  Outcome: Met This Shift  Note:  Patient lung sounds are considered normal for their current lung condition. No signs of distress noted. Current treatment regimen appropriate   Patient remains on HFNC 60L 85%, will continue to wean as patient tolerates.

## 2020-09-21 NOTE — PLAN OF CARE
Problem: Impaired respiratory status  Goal: Lung sounds clear or within normal limits for patient  9/21/2020 6926 by Maddy Yost RCP  Outcome: Ongoing  Continue therapy as ordered to improve breath sounds/aeration.

## 2020-09-21 NOTE — PROGRESS NOTES
[address] [place] [address]  INPATIENT PHYSICAL THERAPY  DAILY NOTE  [facility] ICU STEPDOWN TELEMETRY 4K - 4K-13/013-A    Time In: 1000  Time Out: 1032  Timed Code Treatment Minutes: 28 Minutes  Minutes: 32          Date: 2020  Patient Name: Latisha Dalton,  Gender:  male        MRN: [id number]  : 1940  (68 y.o.)     Referring Practitioner: Dr. Maldonado All  Diagnosis: pleural effusion  Additional Pertinent Hx: admit with above diagnosis, lung cancer, bilateral pleural effusion s/p thoracentesis on 20, anemia, weakness     Prior Level of Function:  Lives With: Alone  Type of Home: Apartment(duplex)  Home Layout: One level  Home Access: Stairs to enter with rails  Entrance Stairs - Number of Steps: 3  Home Equipment: Cane, Rolling walker   Bathroom Shower/Tub: Tub/Shower unit  Bathroom Toilet: Standard  Bathroom Equipment: Grab bars in shower    ADL Assistance: Needs assistance  Ambulation Assistance: Independent  Transfer Assistance: Independent  Additional Comments: Pt reports using cane at home. Has help once in a while with ADLs. per pt if he talks with wife will see if she can come down to help him at discharge, per pt has O2 at home but doesn't wear it like he is supposed to    Restrictions/Precautions:  Restrictions/Precautions: Fall Risk  Position Activity Restriction  Other position/activity restrictions: high flow O2     SUBJECTIVE: RN approved session. Pt in bed upon arrival and agrees to therapy. Pt pleasant and cooperative. PAIN: no c/o pain    SpO2 ranged from 84-93% during session, pt on cont monitor, mult freq rest breaks needed due to SOB and hypoxia.  Pt needing cues for deep breathing through nose    OBJECTIVE:  Bed Mobility:  Rolling to Left: Stand By Assistance   Supine to Sit: Stand By Assistance  Sit to Supine: Stand By Assistance   Scooting: Stand By Assistance    Transfers:  Not Tested  Due to SOB and low O2    Ambulation:  Not Tested    Balance:  Static Sitting Balance:

## 2020-09-21 NOTE — PROGRESS NOTES
Assessment and Plan:        1. Mucous plug left lung- taken to icu 9.19 and had bronch; mucous plug removed. Has extrinsic compression bronchus.  today's cxr suggests recurrence or large effusion. German Dsouza to re evaluate. On high flow, comfortable. May need a diuretic. 2. Lung ca- to begin xrt 9.21 if stable  3. Need palliative care. 4. PT/OT      CC:  Weak, sob   HPI: pt with recent dx lung ca through [facility], scheduled for xrt; adm with bilateral pleural effusions, both sides tapped. Had bronch with removal mucous plug 9.19. Extubated 9.20. ROS (12 point review of systems completed. Pertinent positives noted.  Otherwise ROS is negative) :   PMH:  Per HPI  SHX:  Single, exsmoker  FHX: Noncontributory    Allergies: See above    Medications:       ipratropium-albuterol  1 ampule Inhalation Q4H WA    amLODIPine  10 mg Per NG tube Daily    carvedilol  6.25 mg Per NG tube BID WC    folic acid  1 mg Per NG tube Daily    methocarbamol  1,000 mg Per NG tube TID    spironolactone  25 mg Per NG tube Daily    chlorhexidine  15 mL Mouth/Throat BID    famotidine (PEPCID) injection  20 mg Intravenous BID    sodium chloride flush  10 mL Intravenous 2 times per day    [Held by provider] apixaban  5 mg Oral BID    lidocaine  1 patch Transdermal Daily    [Held by provider] losartan  100 mg Oral Daily    [Held by provider] pravastatin  20 mg Oral Daily       Vital Signs:   BP (!) 117/59   Pulse 58   Temp 98.8 °F (37.1 °C) (Oral)   Resp 20   Ht 6' (1.829 m)   Wt 159 lb 9.8 oz (72.4 kg)   SpO2 92%   BMI 21.65 kg/m²      Intake/Output Summary (Last 24 hours) at 9/21/2020 6489  Last data filed at 9/21/2020 0315  Gross per 24 hour   Intake 788.12 ml   Output 775 ml   Net 13.12 ml        Physical Examination: General appearance - chronically ill appearing  Mental status - alert, oriented to person, place, and time  Neck - supple, no significant adenopathy, no JVD, or carotid bruits  Chest - Breath sounds diminished bilaterally. Heart - normal rate, regular rhythm, normal S1, S2, no murmurs, rubs, clicks or gallops  Abdomen - soft, nontender, nondistended, no masses or organomegaly  Neurological - alert, oriented, normal speech, no focal findings or movement disorder noted  Musculoskeletal - no muscular tenderness noted  Extremities - no pedal edema noted  Skin - normal coloration and turgor, no rashes, no suspicious skin lesions noted    Data: (All radiographs, tracings, PFTs, and imaging are personally viewed and interpreted unless otherwise noted).     Reviewed labs, cxrs      Electronically signed by Candida Whiteside MD on 9/21/2020 at 7:07 AM

## 2020-09-21 NOTE — CARE COORDINATION
[address] [facility] day: 5  Location: [facility]13/013-A Reason for admit: Pleural effusion [J90]   Procedure:   9/16 CT Chest  Bilateral pleural effusions. Left upper and lower lobe consolidations. Nodular densities right lung. Cardiomegaly with small pericardial effusion. 9/16 CT Abdomen  1. Small amount of ascites in the pelvis   2. Hepatomegaly   3. Cholelithiasis   4. Bilateral nonobstructive nephrolithiasis. 9/17 Right Thoracentesis = 920 ml removed  9/18 Left Thoracentesis = 1.4L removed  9/19 Bronchoscopy in ICU; mucus plug removed  9/21 CXR  complete opacification of the left hemithorax which may be due to a large pleural effusion. Appearance of the chest is worse   9/21 Left Thoracentesis = 1.4L removed  Treatment Plan of Care: client admitted with Pleural Effusions (recent diagnosis lung cancer); Palliative Care/Pulmonary/Radiation Oncology following  Barriers to Discharge: 51 CO2; monitor.  Airvo HF Oxygen 100% FIO2/60L continued;   PCP: Juliette Wilcox DO  Readmission Risk Score: 16%  Patient Goals/Plan/Treatment Preferences: met with client, has cane, lives alone, ex-wife is Cely Rich attentive to needs and is only support person, [facility], [facility] referral held as radiation planned 9/22 #1 treatment if medically cleared (if radiation to be held, [facility] can be option); SW following for ECF backup plan, has [facility] home oxygen 2L thru [address] [facility]; monitor for home oxygen eval if not weaned to baseline; will ask physician; await therapy input when stable, monitor for possible Left Pleuryx cather

## 2020-09-21 NOTE — FLOWSHEET NOTE
09/21/20 0325   Provider Notification   Reason for Communication Evaluate   Provider Name Dr. Matilda Goldmann   Provider Notification Physician   Method of Communication Secure Message   Response See orders   Notification Time [phone]- This RN sent a secure message to Dr. Matilda Goldmann to evaluate patient admitted for bilateral pleural effusion. Patient is currently at 100% on 60L on hi flow. O2 sats 86-89%    0328- Dr. Matilda Goldmann placed new orders.  See new orders

## 2020-09-21 NOTE — CARE COORDINATION
9/21/20, 10:25 AM EDT    DISCHARGE PLANNING EVALUATION      SW received an order for \"medical deconditioning, needs placement upon discharge\". Patient is very ill and being considered for [facility]. At this time [facility]ERYL will not follow, please re-consult if needed.

## 2020-09-21 NOTE — PROGRESS NOTES
Comprehensive Nutrition Assessment    Type and Reason for Visit:  Reassess    Nutrition Recommendations/Plan: Will continue Ensure Enlive TID. Recommend MVI. Encouraged po, ONS intake at best efforts. Nutrition Assessment:    Pt improving from a nutritional standpoint AEB tolerating po diet, acceptance of ONS. Remains at risk for further nutritional compromise r/t moderate malnutrition, increased nutrient needs for wound heailng and underlying medical condition (hx lung cancer, COPD). Nutrition recommendations/interventions as per above. Malnutrition Assessment:  Malnutrition Status: Moderate malnutrition    Context:  Acute Illness     Findings of the 6 clinical characteristics of malnutrition:  Energy Intake:  Mild decrease in energy intake (Comment)(skips atleast 1 meal/day per diet hx)  Weight Loss:  (16# wt loss in 1 year per pt,)     Body Fat Loss:  1 - Mild body fat loss Orbital   Muscle Mass Loss:  1 - Mild muscle mass loss Temples (temporalis)  Fluid Accumulation:  Unable to assess     Strength:  Not Performed    Estimated Daily Nutrient Needs:  Energy (kcal):  ~2250 kcals (30/kgm); Weight Used for Energy Requirements:  ((9/18) 75kgm)     Protein (g):  90 grams (1.2 grams protein/kgm);  Weight Used for Protein Requirements:  (75kgm (9/18))          Nutrition Related Findings:  Pt. seen - reports good appetite; reports enjoying Ensure Enlive; 1 BM noted past 48 hours; currently NPO for bronch; 9/18: Sodium 146, Glucose 69, BUN 31, Cr 1.2, Vitamin D 74; Rx includes Folvite, Zofran, Glycolax      Wounds:  Stage I(posterior buttocks)       Current Nutrition Therapies:    DIET DENTAL SOFT;  Dietary Nutrition Supplements: Standard High Calorie Oral Supplement    Anthropometric Measures:  · Height: 6' (182.9 cm)  · Current Body Weight: 159 lb 9.8 oz (72.4 kg)(9/20, bedscale, trace, +2 edema)   · Admission Body Weight: 164 lb 14.5 oz (74.8 kg)((9/17) + 1 RLE & LLE edema)    · Usual Body Weight: (162# 3.2oz (8/3/20) per EMR)     · Ideal Body Weight: 178 lbs;   · BMI: 21.6  · BMI Categories: Underweight (BMI less than 22) age over 72       Nutrition Diagnosis:   · Moderate malnutrition related to catabolic illness as evidenced by mild loss of subcutaneous fat, mild muscle loss(diet hx skips atleast 1 meal/day)      Nutrition Interventions:   Food and/or Nutrient Delivery:  Continue Oral Nutrition Supplement  Nutrition Education/Counseling:  Education initiated(9/21 Encouraged po, ONS intake at best efforts.)   Coordination of Nutrition Care:  Continued Inpatient Monitoring    Goals:  Pt. will tolerate and consume 75% or more of meals to promote wound healing during LOS. Nutrition Monitoring and Evaluation:   Food/Nutrient Intake Outcomes:  Diet Advancement/Tolerance, Food and Nutrient Intake, Supplement Intake  Physical Signs/Symptoms Outcomes:  Biochemical Data, Chewing or Swallowing, GI Status, Fluid Status or Edema, Nutrition Focused Physical Findings, Skin, Weight     Discharge Planning:     Too soon to determine     Electronically signed by Brenden Orellana RD, LD on 9/21/20 at 9:20 AM EDT    Contact: [phone]

## 2020-09-21 NOTE — PROGRESS NOTES
[facility]  Sedation/Analgesia Post Sedation Record    Pt Name: Coby Powell  MRN: [id number]  YOB: 1940  Procedure Performed By: Brandon Hayes MD  Primary Care Physician: Raynell Gottron, DO    POST-PROCEDURE    Physicians/Assistants: Brandon Hayes MD    Procedure Performed: Cheri schulte      Sedation/Anesthesia: [x] Local [] Conscious Sedation with Fentanyl & Versed     Specimens Removed:  [x] Yes [] No        Disposition of Specimen:  [x] Pathology [] Disposed       Complications:   [x]None Immediate []Other:       Post-procedure Diagnosis/Findings:    1.4 L removed. Estimated blood loss:          None        Recommendations: Follow post-procedure orders.          Brandon Hayes MD  Electronically signed 9/21/2020 at 12:33 PM

## 2020-09-21 NOTE — PROGRESS NOTES
[facility]    Patient - Karol Henry   MRN -  [id number]   [facility]   - 1940      Date of Admission -  2020  8:51 PM  Date of evaluation -  2020  Room - -13/013-A   Hospital Day - 5  Consulting - Josy Velásquez MD Primary Care Physician - Grazyna Izaguirre DO     Problem List      Active Hospital Problems    Diagnosis Date Noted    Mucus plugging of bronchi [J98.09]     Moderate malnutrition ([address] [E44.0] 2020    Malignant neoplasm of upper lobe of lung ([address] [C34.10]     Acute on chronic respiratory failure with hypoxia (HCC) [J96.21]     Status post thoracentesis [Z98.890]     Bilateral pleural effusion [J90] 2020     Chief complaint   Shortness of breath and abnormal chest Xray  HPI    68 y.o. with PMH of HTN, CAD, HLD, COPD, PAF, brought in by his ex-wife due to ongoing generalized fatigue for the past 2-3 weeks. Patient lives alone but his ex-wife checked on him today, found patient unable to get up from the couch which prompted the ED visit. Patient was recently diagnosed with lung cancer stage I and was scheduled to initiate radiation therapy when he was found to have bilateral pleural effusions which lead to cancellation of his therapy. Patient is scheduled to get pleural effusions tapped at 0930 today (2020) and then is scheduled to get radiation therapy afterwards.     ED: presented afebrile and hemodynamically stable, hypertensive to 144/77. Placed on 2L NC, saturating >95%. Labs revealed Cr 1.7 (baseline 0.70), elevated LFTs, elevated trop at 0.038, anemia to 11.5, UA negative for infection. CXR showed congestion with complete opacification of the LLL likely due to combination of consolidation and pleural fluid. CT chest showed bilateral pleural effusions, left upper and lower lobe consolidations, nodular densities in the right lung, cardiomegaly with small pericardial effusion.  CT A/P revealed small amount of ascites in the pelvis, hepatomegaly, cholelithiasis, and bilateral non-obstructive nephrolithiasis. COVID negative.     Patient follows with Dr. Natividad Nuñez, radiation oncology. He has an extensive smoking history, 2-3 ppd since he was 8years old. Patient is supposed to be on oxygen at home but does not use it given that he continues to smoke. Patient reports that he drink alcohol daily with 3-4 beers daily and Julius Beam up to a pint/day.      Per patient and his ex-wife, he has been feeling increasingly fatigued, limiting his ability to move around. He reportedly had a fall out of his couch the morning prior to admission, he could not get up on his own so he had to call 911 who helped him get back up. He has not been able to eat or drink much in the past 2 to 3 weeks due to weakness. he reports having dyspnea at baseline which is recently progressively worsening. Patient reports having no chest pain, dizziness, diaphoresis, nausea vomiting, abdominal pain, dysuria, headaches, subjective fevers or chills. Per his ex-wife he always has some edema in his legs which she attributes to his venous problems. 9/19- Bronchoscopy with intubation moved to ICU  9/20- extubated and moved out of ICU  Events in the last 24hours   - Left opacification of left side chest  - Continues on HFNC 60% 100LPM  - Tmax 99.2  - Denies any SOB or chest discomfort   -All systems reviewed.      PMHx   Past Medical History      Diagnosis Date    Arthritis     Atrial fibrillation ([address]     CAD (coronary artery disease)     Cancer ([address]     COPD (chronic obstructive pulmonary disease) ([address]     Hyperlipidemia     Hypertension       Past Surgical History        Procedure Laterality Date    EYE SURGERY      Cataracts removed    JOINT REPLACEMENT      Hip    LUNG BIOPSY      VASCULAR SURGERY      Stents in leg     Meds    Current Medications    ipratropium-albuterol  1 ampule Inhalation Q4H WA    amLODIPine  10 mg Per NG tube Daily    carvedilol  6.25 mg Per NG tube BID WC    folic acid  1 mg Per NG tube Daily    methocarbamol  1,000 mg Per NG tube TID    spironolactone  25 mg Per NG tube Daily    chlorhexidine  15 mL Mouth/Throat BID    famotidine (PEPCID) injection  20 mg Intravenous BID    sodium chloride flush  10 mL Intravenous 2 times per day    [Held by provider] apixaban  5 mg Oral BID    lidocaine  1 patch Transdermal Daily    [Held by provider] losartan  100 mg Oral Daily    [Held by provider] pravastatin  20 mg Oral Daily     PHENobarbital **OR** PHENobarbital **OR** PHENobarbital IVPB, albuterol, fentanNYL, sodium chloride flush, acetaminophen **OR** acetaminophen, polyethylene glycol, promethazine **OR** ondansetron  IV Drips/Infusions    Home Medications  Medications Prior to Admission: vitamin D (CHOLECALCIFEROL) 25 MCG (1000 UT) TABS tablet, Take 2,000 Units by mouth daily  amLODIPine (NORVASC) 10 MG tablet, Take 10 mg by mouth daily  apixaban (ELIQUIS) 5 MG TABS tablet, Take by mouth 2 times daily  carvedilol (COREG) 6.25 MG tablet, Take 6.25 mg by mouth 2 times daily (with meals)  losartan (COZAAR) 100 MG tablet, Take 100 mg by mouth daily  methocarbamol (ROBAXIN) 500 MG tablet, Take 1,000 mg by mouth 3 times daily  Multiple Vitamins-Minerals (ICAPS AREDS 2 PO), Take 1 capsule by mouth 2 times daily  pravastatin (PRAVACHOL) 40 MG tablet, Take 20 mg by mouth daily  diclofenac sodium (VOLTAREN) 1 % GEL, Apply 4 g topically 4 times daily as needed for Pain  lidocaine (LIDODERM) 5 %, Place 1 patch onto the skin daily 12 hours on, 12 hours off.  sildenafil (VIAGRA) 100 MG tablet, Take 100 mg by mouth as needed for Erectile Dysfunction  spironolactone (ALDACTONE) 25 MG tablet, Take 25 mg by mouth daily  Diet    DIET DENTAL SOFT;  Dietary Nutrition Supplements: Standard High Calorie Oral Supplement  Allergies    Patient has no known allergies.   Social History     Social History     Socioeconomic History    Marital filed at 9/21/2020 0315  Gross per 24 hour   Intake 788.12 ml   Output 775 ml   Net 13.12 ml     I/O last 3 completed shifts: In: 788.1 [P.O.:400; I.V.:388.1]  Out: 775 [Urine:775]   Patient Vitals for the past 96 hrs (Last 3 readings):   Weight   09/20/20 0340 159 lb 9.8 oz (72.4 kg)   09/19/20 1703 157 lb 13.6 oz (71.6 kg)   09/19/20 0307 141 lb 12.1 oz (64.3 kg)       Exam   Physical Exam   Constitutional: No distress on O2 per HFNC. Patient appears frail and elderly   Mouth/Throat: Oropharynx is clear and moist.  No oral thrush. Eyes: Conjunctivae are normal. Pupils are equal, round. No scleral icterus. Neck: Neck supple. No tracheal deviation present. Cardiovascular: Afib. S1 and S2 with no murmur. No peripheral edema  Pulmonary/Chest: Normal effort with bilateral air entry, Diminished breath sounds throughout L>R. No stridor. No respiratory distress. Patient exhibits no tenderness. No wheezing   Abdominal: Soft. Bowel sounds audible. No distension or tenderness to palp. Musculoskeletal: Moves all extremities; no cyanosis  Neurological: Patient is alert and oriented to person, place, and time. Skin: Warm and dry. Pale    Labs  - Old records and notes have been reviewed in [facility]   ABG  Lab Results   Component Value Date    PH 7.44 09/21/2020    PO2 60 09/21/2020    PCO2 51 09/21/2020    HCO3 34 09/21/2020    O2SAT 91 09/21/2020     Lab Results   Component Value Date    IFIO2 15 09/18/2020     CBC  Recent Labs     09/19/20  0759   HGB 12.0*   HCT 39.0*      BMP  No results for input(s): NA, K, CL, CO2, BUN, CREATININE, GLUCOSE, MG, PHOS, CALCIUM, IONCA, MG in the last 72 hours. LFT  No results for input(s): AST, ALT, ALB, BILITOT, ALKPHOS, LIPASE in the last 72 hours. Invalid input(s): AMYLASE  TROP  Lab Results   Component Value Date    TROPONINT 0.042 09/18/2020    TROPONINT 0.037 09/17/2020    TROPONINT 0.041 09/17/2020     BNP  No results for input(s): BNP in the last 72 hours.   Lactic Acid  No results for input(s): LACTA in the last 72 hours. INR  No results for input(s): INR, PROTIME in the last 72 hours. PTT  Recent Labs     09/20/20  1626 09/20/20  2304 09/21/20  0541   APTT 27.4 101.9* 64.4*     Glucose  No results for input(s): POCGLU in the last 72 hours. UA   Recent Labs     09/18/20  1245   COLORU LIGHT YELLOW   . Thoracentesis fluid analysis results. Performed on: 9/17/2020  Side: right side of chest .  By IR: Yes  Amount of pleural fluid drained: 0.92L    Lab Results   Component Value Date    COLORU LIGHT YELLOW 09/18/2020    COLORU DK YELLOW 09/16/2020    LDFL 54 09/18/2020    PROTEINFL 1.4 09/18/2020     Lab Results   Component Value Date    PROT 5.2 09/18/2020     CYTOLOGY: pending     Serum LDH: None  LDH ratio i.e Pleural fluid LDH/ Serum LDH: no serum LDH  Total protein ratio i.e Pleural fluid Total protein/ Serum Total protein: 1.2/5.2=0.23    Pleural fluid cultures were negative so far. Gram stain of pleural fluid is negative for any organisms. Thoracentesis fluid analysis results. Performed on: 9/18/20  Side: left side of chest .  By IR: Yes  Amount of pleural fluid drained: 1.4L    Lab Results   Component Value Date    COLORU LIGHT YELLOW 09/18/2020    COLORU DK YELLOW 09/16/2020    LDFL 54 09/18/2020    PROTEINFL 1.4 09/18/2020     Lab Results   Component Value Date    PROT 5.2 09/18/2020     CYTOLOGY: pending     Serum LDH: 199  LDH ratio i.e Pleural fluid LDH/ Serum LDH: 54/199 = 0.271  Total protein ratio i.e Pleural fluid Total protein/ Serum Total protein: 1.4/5.2= 0.26    Pleural fluid cultures were negative so far. Gram stain of pleural fluid is negative for any organisms.     Culture, Respiratory [[id number]]   Collected: 09/19/20 1800    Order Status: Completed  Specimen: Esophageal Brush Specimen Quantitative Count  Updated: 09/21/20 0711     Respiratory Culture  Endotracheal tube cultures do not correlate well with chest x-ray results and are a poor predictor of true pulmonary infections. Normal edgar- preliminary     Gram Stain Result  No segmented neutrophils observed. Rare epithelial cells observed. Rare gram negative bacilli. Rare gram positive cocci in pairs. Cultures      Pneumonia Panel, Molecular [[id number]]   Collected: 09/19/20 1830    Order Status: Completed  Specimen: BAL- Bronch.  Lavage  Updated: 09/19/20 2232     Source  ET aspirates     Specimen Acceptability  see below     Comment:  Acceptable, >=25 WBCs/LPF        Acinetobacter calcoaceticus-baumannii by PCR  Not Detected     Enterobacter cloacae complex by PCR  Not Detected     Escherichia coli by PCR  Not Detected     Haemophilus Influenzae by PCR  Not Detected     Klebsiella aerogenes by PCR  Not Detected     Klebsiella oxytoca by PCR  Not Detected     Klebsiella pneumoniae by PCR  Not Detected     Moraxella catarrhalis by PCR  Not Detected     Proteus species by PCR  Not Detected     Pseudomonas aeruginosa by PCR  Not Detected     Serratia marcescens by PCR  Not Detected     Staph aureus by PCR  Not Detected     Strep agalactiae by PCR  Not Detected     Strep pneumoniae by PCR  Not Detected     Strep pyogenes by PCR  Not Detected     Resistant gene ctx-m by PCR  NA     Resistant gene imp by PCR  NA     Resistant gene kpc by PCR  NA     Resistant gene meca/c & mrej by PCR  NA     Resistant gene ndm by PCR  NA     Resistant gene oxa-48-like by pcr  NA     Resistant gene vim by PCR  NA     Chlamydia pneumoniae By PCR  Not Detected     Legionella Pneumophilia By PCR  Not Detected     Mycoplasma pneumoniae by PCR  Not Detected     Adenovirus by PCR  Not Detected     CORONAVIRUS PCR  Not Detected     Metapneumovirus by PCR  Not Detected     Rhinovirus Enterovirus PCR  Not Detected     Influenza A by PCR  Not Detected     Influenza B by PCR  Not Detected     Parainfluenza virus by PCR  Not Detected     Respiratory Syncytial Virus by PCR  Not Detected      (-) MRSA  (-) VRE    Culture, Body Fluid [[id number]]   Collected: 09/18/20 1245    Order Status: Completed  Specimen: Body Fluid from Pleural Fluid  Updated: 09/20/20 0835     Body Fluid Culture, Sterile  No growth-preliminary No growth     Gram Stain Result  Few segmented neutrophils observed. No bacteria seen. performed on cytospun specimen      Culture, Urine [[id number]]   Collected: 09/19/20 1730    Order Status: Completed  Specimen: Urine  Updated: 09/20/20 0846     Urine Culture, Routine  No growth-preliminary      SARS-CoV-2, NAAT  NOT DETECTED  NOT DETECTED  Final  09/16/2020 10:00 PM        Echocardiogram   None to review     Radiology    9/21/2020   XR CHEST PORTABLE   There is complete opacification of the left hemithorax which may be due to a large pleural effusion. Appearance of the chest is worse when compared to the previous exam.         9/20/2020   XR CHEST PORTABLE   Cardiomegaly and pulmonary vascular congestion with small bilateral pleural effusions. The appearance of the chest has slightly improved when compared to the previous study. Assessment   Stage I lung Adenocarcinoma:  radiation oncology following. Current tobacco smoker 1PPD  - Follow with Dr. Justen Fisher MD    Acute on chronic hypoxic respiratory failure: 2/2 hx of lung cancer and mucous plugging   -9/19: Bronchoscopy Dr. Anselmo Franco MD- complete opacification of left lung   -9/20: Extubated this AM doing well on HFNC 60% 60LPM    Bilateral Pleural effusions:  Right thoracentesis done 9/17. Left thoracentesis done 9/18- CXR worse 9/19 with left deviation of trachea ? Mucous plugging- Pulmonary consulted with transfer to ICU and Bronchoscopy with Dr. Anselmo Franco MD 9/19  -9/20: cytology pending. CXR slightly improved - very small bilateral pleural effusions.  -9/21: Left thoracentesis     Weakness: PT/OT- ECF placement at DC- likely r/t worsening pulmonary status over last several months     Anemia: multifactorial    Palliative Care to see when stable- currently Full Code. Hypotension:   -9/20: Levophed gtt DC; BP remains stable    ETOH Abuse: patient admits to drinking daily  9/20: Initiate Phenobarbital alcohol withdrawal protocol     Full Code: Palliative to see      Plan   -Will schedule patient for ultrasound guided thoracentesis on left side of chest by interventional radiology service with left pigtail placement discussed this with Dr. Lori Cornell and Dr. Latisha Kaur feel pigtail is best choice at this time and can be changed if need be to pleurex   -Will send pleural fluid to routine analysis including PH, total protein, LDH, triglycerides, Amylase, cell count, Cytology with cell block, Gram stain and cultures and  Fungal cultures. Please send simultaneous serum total protein and LDH for comparison.  -Karol Henry was educated and informed about planned thoracentesis procedure and its associated compliacations including but not limited to bleeding, infection, development of pneumothorax with requirement of chest tube placement and prolonged hospital stay. He agreed to take risk. He verbalizes understading of complications.  -Wean HF per protocol   -CXR portable AM  -Will follow cultures/cytology- still pending cytologies. Grams stain from previous thoracentesis's have been (-)  -Duonebs every 4 hours w/a  -Brovana neb BID  -Palliative Care to see regarding CODE status and dx of lung CA  -If patients condition deteriorates may need re-intubated and transferred back to ICU and possible bronchoscopy procedure repeated. -Phenobarbital ETOH withdrawal protocol in place  -[place] evaluation in place but was told our Oncology cannot treat over there- primary aware as well   -Smoking cessation     Case discussed with nurse and patient/family. Questions and concerns addressed. Meds and orders reviewed. Electronically signed by DANIEL Rivera - CNP on 9/21/2020 at 10:53 AM    Addendum by Dr. Aakash Abarca MD:  I have seen and examined the patient independently.  Face to face evaluation and examination was performed. The above evaluation and note has been reviewed. Labs and radiographs were reviewed. I Have discussed with Ms. DANIEL Tompkins- MENDEL about this patient in detail. The above assessment and plan has been reviewed. Please see my modifications mentioned below. My modifications:  He remain on Hi flow at 60LPM with 92%. He told me that he is on chronic anticoagulation with Xarelto due to DVT diagnosed for the first time in his left lower extremity 3years back at [facility]- No old records available. Will do repeat duplex scan of both lower extremities to decide about the need of anticoagulation. Sep 21, 2020   PROCEDURE: Bilateral lower extremity venous duplex examination    1. No sonographic evidence of lower extremity DVT. Chest Xray:  Sep 21, 2020   PROCEDURE: XR CHEST PA INSPIRATION 1 VW   No pneumothorax post left thoracentesis. Thoracentesis fluid analysis results. Performed on: 9/21/2020   Side: left side of chest .  By IR: Yes  Amount of pleural fluid drained:  1.4 L of fluid drained    Lab Results   Component Value Date    PHFL 7.63 09/21/2020    COLORU LIGHT YELLOW 09/21/2020    COLORU DK YELLOW 09/16/2020    LDFL 86 09/21/2020    PROTEINFL 1.4 09/21/2020     Lab Results   Component Value Date    PROT 4.4 09/21/2020     CYTOLOGY: Pending    Serum LDH: 216  LDH ratio i.e Pleural fluid LDH/ Serum LDH:  86/216=0.39  Total protein ratio i.e Pleural fluid Total protein/ Serum Total protein: 1.4/4.4=0.31    Pleural fluid cultures were negative so far. Gram stain of pleural fluid is negative for any organisms. Assessment:  Recurrent transudate left side effusion with negative cultures- ? Etiology. Plan;  Stop Eliquis. He had DVT in left leg few years back. No other indication for long term anticoagulation at this time  Lovenox 40mg SQ daily for DVT prophylaxis- hold for planned pigtail cath. Follow pleural fluid cytology.   Will do Echo to

## 2020-09-21 NOTE — PROGRESS NOTES
[address] THERAPY MISSED TREATMENT NOTE  [facility] ICU STEPDOWN TELEMETRY 4K  4K-13/013-A      Date: 2020  Patient Name: Latisha Dalton        CSN: [id number]   : 1940  (68 y.o.)  Gender: male   Referring Practitioner: Dr. Kelli Alcazar  Diagnosis: B pleural effusion         REASON FOR MISSED TREATMENT: Hold Treatment per Nursing. Pt currently on high flow at 100% Fi02 and 60 LPM, scheduled for bedside thoracentesis in few minutes. Additionally, RT reported desaturation with rolling in bed, not appropriate for therapy this date.  Will check back as able

## 2020-09-21 NOTE — CARE COORDINATION
9/21/20, 3:36 PM EDT    DISCHARGE PLANNING EVALUATION      SW left message for patient's contacts listed on his face sheet to assist in his plan. Await return call and will then speak to patient.

## 2020-09-21 NOTE — PROGRESS NOTES
Patient asleep at time of visit, did not disturb. Chart reviewed and discussed with primary RN [facility]. Awaiting records from [place] in regards to cancer diagnosis/treatment. Thoracentesis done today. Palliative care will follow up with patient tomorrow.

## 2020-09-22 ENCOUNTER — APPOINTMENT (OUTPATIENT)
Dept: RADIATION ONCOLOGY | Age: 80
End: 2020-09-22
Attending: RADIOLOGY
Payer: OTHER GOVERNMENT

## 2020-09-22 ENCOUNTER — APPOINTMENT (OUTPATIENT)
Dept: GENERAL RADIOLOGY | Age: 80
DRG: 208 | End: 2020-09-22
Payer: OTHER GOVERNMENT

## 2020-09-22 ENCOUNTER — APPOINTMENT (OUTPATIENT)
Dept: ULTRASOUND IMAGING | Age: 80
DRG: 208 | End: 2020-09-22
Payer: OTHER GOVERNMENT

## 2020-09-22 LAB
ALBUMIN SERPL-MCNC: 2.3 G/DL (ref 3.5–5.1)
ALP BLD-CCNC: 99 U/L (ref 38–126)
ALT SERPL-CCNC: 31 U/L (ref 11–66)
ANAEROBIC CULTURE: NORMAL
ANION GAP SERPL CALCULATED.3IONS-SCNC: 8 MEQ/L (ref 8–16)
AST SERPL-CCNC: 15 U/L (ref 5–40)
BILIRUB SERPL-MCNC: 0.5 MG/DL (ref 0.3–1.2)
BODY FLUID CULTURE, STERILE: NORMAL
BUN BLDV-MCNC: 21 MG/DL (ref 7–22)
CALCIUM SERPL-MCNC: 8.2 MG/DL (ref 8.5–10.5)
CHLORIDE BLD-SCNC: 104 MEQ/L (ref 98–111)
CO2: 30 MEQ/L (ref 23–33)
CREAT SERPL-MCNC: 0.8 MG/DL (ref 0.4–1.2)
ERYTHROCYTE [DISTWIDTH] IN BLOOD BY AUTOMATED COUNT: 14.5 % (ref 11.5–14.5)
ERYTHROCYTE [DISTWIDTH] IN BLOOD BY AUTOMATED COUNT: 53.2 FL (ref 35–45)
GFR SERPL CREATININE-BSD FRML MDRD: > 90 ML/MIN/1.73M2
GLUCOSE BLD-MCNC: 88 MG/DL (ref 70–108)
GRAM STAIN RESULT: NORMAL
GRAM STAIN RESULT: NORMAL
HCT VFR BLD CALC: 30.1 % (ref 42–52)
HEMOGLOBIN: 9.4 GM/DL (ref 14–18)
LV EF: 50 %
LVEF MODALITY: NORMAL
MAGNESIUM: 1.9 MG/DL (ref 1.6–2.4)
MCH RBC QN AUTO: 31.2 PG (ref 26–33)
MCHC RBC AUTO-ENTMCNC: 31.2 GM/DL (ref 32.2–35.5)
MCV RBC AUTO: 100 FL (ref 80–94)
PLATELET # BLD: 166 THOU/MM3 (ref 130–400)
PMV BLD AUTO: 10.8 FL (ref 9.4–12.4)
POTASSIUM SERPL-SCNC: 4.2 MEQ/L (ref 3.5–5.2)
PRO-BNP: 5058 PG/ML (ref 0–1800)
RBC # BLD: 3.01 MILL/MM3 (ref 4.7–6.1)
RESPIRATORY CULTURE: NORMAL
SODIUM BLD-SCNC: 142 MEQ/L (ref 135–145)
TOTAL PROTEIN: 4.7 G/DL (ref 6.1–8)
WBC # BLD: 8.1 THOU/MM3 (ref 4.8–10.8)

## 2020-09-22 PROCEDURE — C1769 GUIDE WIRE: HCPCS

## 2020-09-22 PROCEDURE — 32557 INSERT CATH PLEURA W/ IMAGE: CPT

## 2020-09-22 PROCEDURE — 93306 TTE W/DOPPLER COMPLETE: CPT

## 2020-09-22 PROCEDURE — 83735 ASSAY OF MAGNESIUM: CPT

## 2020-09-22 PROCEDURE — 71045 X-RAY EXAM CHEST 1 VIEW: CPT

## 2020-09-22 PROCEDURE — 88305 TISSUE EXAM BY PATHOLOGIST: CPT

## 2020-09-22 PROCEDURE — C1729 CATH, DRAINAGE: HCPCS

## 2020-09-22 PROCEDURE — 94669 MECHANICAL CHEST WALL OSCILL: CPT

## 2020-09-22 PROCEDURE — 6370000000 HC RX 637 (ALT 250 FOR IP): Performed by: NURSE PRACTITIONER

## 2020-09-22 PROCEDURE — 2709999900 HC NON-CHARGEABLE SUPPLY

## 2020-09-22 PROCEDURE — 97110 THERAPEUTIC EXERCISES: CPT

## 2020-09-22 PROCEDURE — 36415 COLL VENOUS BLD VENIPUNCTURE: CPT

## 2020-09-22 PROCEDURE — 2700000000 HC OXYGEN THERAPY PER DAY

## 2020-09-22 PROCEDURE — 94640 AIRWAY INHALATION TREATMENT: CPT

## 2020-09-22 PROCEDURE — 80053 COMPREHEN METABOLIC PANEL: CPT

## 2020-09-22 PROCEDURE — 99233 SBSQ HOSP IP/OBS HIGH 50: CPT | Performed by: INTERNAL MEDICINE

## 2020-09-22 PROCEDURE — 0W9B3ZZ DRAINAGE OF LEFT PLEURAL CAVITY, PERCUTANEOUS APPROACH: ICD-10-PCS | Performed by: RADIOLOGY

## 2020-09-22 PROCEDURE — 2500000003 HC RX 250 WO HCPCS: Performed by: NURSE PRACTITIONER

## 2020-09-22 PROCEDURE — 83880 ASSAY OF NATRIURETIC PEPTIDE: CPT

## 2020-09-22 PROCEDURE — 2580000003 HC RX 258: Performed by: STUDENT IN AN ORGANIZED HEALTH CARE EDUCATION/TRAINING PROGRAM

## 2020-09-22 PROCEDURE — 2060000000 HC ICU INTERMEDIATE R&B

## 2020-09-22 PROCEDURE — 94761 N-INVAS EAR/PLS OXIMETRY MLT: CPT

## 2020-09-22 PROCEDURE — 85027 COMPLETE CBC AUTOMATED: CPT

## 2020-09-22 PROCEDURE — 88112 CYTOPATH CELL ENHANCE TECH: CPT

## 2020-09-22 PROCEDURE — APPSS30 APP SPLIT SHARED TIME 16-30 MINUTES: Performed by: NURSE PRACTITIONER

## 2020-09-22 RX ADMIN — ARFORMOTEROL TARTRATE 15 MCG: 15 SOLUTION RESPIRATORY (INHALATION) at 08:14

## 2020-09-22 RX ADMIN — Medication 10 ML: at 09:35

## 2020-09-22 RX ADMIN — Medication 15 ML: at 09:35

## 2020-09-22 RX ADMIN — IPRATROPIUM BROMIDE AND ALBUTEROL SULFATE 1 AMPULE: .5; 3 SOLUTION RESPIRATORY (INHALATION) at 21:15

## 2020-09-22 RX ADMIN — METHOCARBAMOL TABLETS 1000 MG: 500 TABLET, COATED ORAL at 14:12

## 2020-09-22 RX ADMIN — SPIRONOLACTONE 25 MG: 25 TABLET ORAL at 09:34

## 2020-09-22 RX ADMIN — Medication 10 ML: at 20:23

## 2020-09-22 RX ADMIN — CARVEDILOL 6.25 MG: 6.25 TABLET, FILM COATED ORAL at 09:34

## 2020-09-22 RX ADMIN — AMLODIPINE BESYLATE 10 MG: 10 TABLET ORAL at 12:13

## 2020-09-22 RX ADMIN — IPRATROPIUM BROMIDE AND ALBUTEROL SULFATE 1 AMPULE: .5; 3 SOLUTION RESPIRATORY (INHALATION) at 16:21

## 2020-09-22 RX ADMIN — ARFORMOTEROL TARTRATE 15 MCG: 15 SOLUTION RESPIRATORY (INHALATION) at 21:16

## 2020-09-22 RX ADMIN — CARVEDILOL 6.25 MG: 6.25 TABLET, FILM COATED ORAL at 17:36

## 2020-09-22 RX ADMIN — IPRATROPIUM BROMIDE AND ALBUTEROL SULFATE 1 AMPULE: .5; 3 SOLUTION RESPIRATORY (INHALATION) at 12:22

## 2020-09-22 RX ADMIN — IPRATROPIUM BROMIDE AND ALBUTEROL SULFATE 1 AMPULE: .5; 3 SOLUTION RESPIRATORY (INHALATION) at 08:14

## 2020-09-22 RX ADMIN — FAMOTIDINE 20 MG: 10 INJECTION INTRAVENOUS at 09:34

## 2020-09-22 ASSESSMENT — PAIN SCALES - GENERAL
PAINLEVEL_OUTOF10: 0

## 2020-09-22 NOTE — PROGRESS NOTES
Hospitalist Progress Note    Patient:  Chelsea Barbour      Unit/Bed:4K-13/013-A    YOB: 1940    MRN: [id number]       Acct: [de-identified]     PCP: Ifeanyi Echavarria DO    Date of Admission: 9/16/2020    Assessment/Plan:    1. Acute on Chronic Hypoxic Respiratory Failure: 2/2 to right upper lobe lung cancer and mucous plugging. On arrival, patient was placed on 2L NC and saturating > 95%. Patient was taken to the ICU on 09/19 and had bronchoscopy to remove mucous plug. Patient was extubated on 09/20 and placed on HFNC 60% 60 LPM.  1. Titrate O2 to keep SpO2 >90%. Wean O2 as tolerated  2. DuoNebs every 4 hours while awake  3. If condition deteriorates may need reintubation and transferred back to ICU  2. Stage I Lung Adenocarcinoma: Patient was recently diagnosed with lung cancer and was scheduled to initiate radiation therapy. Patient follows with Dr. Xi Wade, radiation oncology. Patient has an extensive smoking history, 2-3 PPD since he was 8years old. Patient is supposed to be on oxygen at home but does not use it given that he continues to smoke. 1. Follow recommendations per Dr. Xi Wade MD  3. Bilateral Pleural Effusions: CT chest on 09/16/2020 showed bilateral pleural effusion. Left upper and lower lobe consolidations. Nodular densities right lung. Cardiomegaly with small pericardial effusion. Pulmonology was consulted. Per their recommendation: right thoracentesis done on 09/17/2020. Left thoracentesis done on 09/18/2020 with 1.4L removed; consistent with transudate effusion. 1. 09/22/2020 --> per pulmonology: Pigtail catheter placement by IR service. 4. Deconditioning/malnutrition: Patient lives alone and has not been doing well. Ex-wife lives 45 minutes away and expressed concern about his care. She is unable to provide care for him. 1. PT/OT consulted  2.  Dietitian consulted      Disposition:    [] Home       [] TCU       [] Rehab       [] Psych       [] SNF [x] [facility]       [] Other-    Chief Complaint: Weakness    Hospital Course: This is a 77-year-old male with past medical history of HTN, CAD, HLD, COPD, PAF, brought in by his ex-wife due to ongoing generalized fatigue for the past 2 to 3 weeks. Patient's ex-wife found patient unable to get up from the couch which prompted the ED visit. Patient was recently diagnosed with lung cancer stage I and was scheduled to initiate radiation therapy. Patient follows with Dr. Kristine Plascencia, radiation oncology. He has an extensive smoking history, 2-3 ppd since he was 8years old. Per patient and his ex-wife, he has been feeling increasingly fatigued, limiting his ability to move around. He reports reportedly had a fall out of his couch the morning prior to admission, he could not get up on his own so had to call 911 who helped him get back up. He has not been able to eat or drink much in the past 2 to 3 weeks due to weakness. He reports having dyspnea at baseline which is recently progressively worsening. In the ED, patient presented afebrile and hemodynamically stable, BP of 144/77. Placed on 2L NC, saturating > 95%. CXR showed congestion with complete opacification of the LLL likely due to combination of consolidation and pleural fluid. CT chest showed bilateral pleural effusions, left upper and lower lobe consolidations, nodular densities in the right lung, cardiomegaly with small pericardial effusion. CT abdomen and pelvis revealed small amount of ascites in the pelvis, hepatomegaly, cholelithiasis, and bilateral nonobstructive nephrolithiasis. COVID negative. Subjective (past 24 hours):   No acute overnight events. Tmax of 99.4. Denies any chest pain, shortness of breath, palpitations, nausea, vomiting. No acute complaints at this time.     Medications:  Reviewed    Infusion Medications   Scheduled Medications    Arformoterol Tartrate  15 mcg Nebulization BID    [Held by provider] enoxaparin  40 mg Subcutaneous Daily    magnesium replacement protocol   Other RX Placeholder    ipratropium-albuterol  1 ampule Inhalation Q4H WA    amLODIPine  10 mg Per NG tube Daily    carvedilol  6.25 mg Per NG tube BID WC    folic acid  1 mg Per NG tube Daily    methocarbamol  1,000 mg Per NG tube TID    spironolactone  25 mg Per NG tube Daily    chlorhexidine  15 mL Mouth/Throat BID    famotidine (PEPCID) injection  20 mg Intravenous BID    sodium chloride flush  10 mL Intravenous 2 times per day    lidocaine  1 patch Transdermal Daily    [Held by provider] losartan  100 mg Oral Daily    [Held by provider] pravastatin  20 mg Oral Daily     PRN Meds: PHENobarbital **OR** PHENobarbital **OR** PHENobarbital IVPB, albuterol, fentanNYL, sodium chloride flush, acetaminophen **OR** acetaminophen, polyethylene glycol, promethazine **OR** ondansetron      Intake/Output Summary (Last 24 hours) at 9/22/2020 5908  Last data filed at 9/22/2020 6264  Gross per 24 hour   Intake 555.21 ml   Output 875 ml   Net -319.79 ml       Diet:  DIET DENTAL SOFT;  Dietary Nutrition Supplements: Standard High Calorie Oral Supplement    Exam:  /60   Pulse 91   Temp 99.4 °F (37.4 °C) (Oral)   Resp 20   Ht 6' (1.829 m)   Wt 159 lb 9.8 oz (72.4 kg)   SpO2 91%   BMI 21.65 kg/m²     General appearance: No apparent distress, appears stated age and cooperative. On O2 per HFNC. HEENT: Pupils equal, round, and reactive to light. Conjunctivae/corneas clear. Neck: Supple, with full range of motion. No jugular venous distention. Trachea midline. Respiratory:  Normal respiratory effort. Diminished breath sounds throughout with absent left breath sounds. Cardiovascular:  Normal S1/S2 without murmurs, rubs or gallops. Abdomen: Soft, non-tender, non-distended with normal bowel sounds. Musculoskeletal: passive and active ROM x 4 extremities.   Skin: Skin color, texture, turgor normal.  No rashes or lesions. Neurologic:  Neurovascularly intact without any focal sensory/motor deficits. Psychiatric: Alert and oriented, thought content appropriate, normal insight  Capillary Refill: Brisk,< 3 seconds   Peripheral Pulses: +2 palpable, equal bilaterally       Labs:   Recent Labs     09/19/20  0759 09/22/20  0536   WBC  --  8.1   HGB 12.0* 9.4*   HCT 39.0* 30.1*   PLT  --  166     Recent Labs     09/21/20  1527      K 3.7      CO2 30   BUN 19   CREATININE 0.9   CALCIUM 8.0*     No results for input(s): AST, ALT, BILIDIR, BILITOT, ALKPHOS in the last 72 hours. No results for input(s): INR in the last 72 hours. No results for input(s): [place] in the last 72 hours. Microbiology:      Urinalysis:      Lab Results   Component Value Date    NITRU NEGATIVE 09/16/2020    WBCUA 2-4 09/16/2020    BACTERIA NONE SEEN 09/16/2020    RBCUA 3-5 09/16/2020    BLOODU NEGATIVE 09/16/2020    GLUCOSEU NEGATIVE 09/16/2020       Radiology:  VL DUP LOWER EXTREMITY VENOUS BILATERAL   Final Result      1. No sonographic evidence of lower extremity DVT. **This report has been created using voice recognition software. It may contain minor errors which are inherent in voice recognition technology. **      Final report electronically signed by Dr. Mk Bose on 9/21/2020 2:08 PM      XR CHEST PA INSPIRATION 1 VW   Final Result   No pneumothorax post left thoracentesis. **This report has been created using voice recognition software. It may contain minor errors which are inherent in voice recognition technology. **      Final report electronically signed by Dr. Ness Melissa on 9/21/2020 1:50 PM      US THORACENTESIS   Final Result      Successful ultrasound-guided thoracentesis with  1.4 L of fluid drained. **This report has been created using voice recognition software. It may contain minor errors which are inherent in voice recognition technology. **      Final report electronically signed by Dr. Suhail Shaffer on 9/21/2020 12:33 PM      XR CHEST PORTABLE   Final Result   There is complete opacification of the left hemithorax which may be due to a large pleural effusion. Appearance of the chest is worse when compared to the previous exam.               **This report has been created using voice recognition software. It may contain minor errors which are inherent in voice recognition technology. **      Final report electronically signed by Dr Maddy Deras on 9/21/2020 6:20 AM      XR CHEST PORTABLE   Final Result   Cardiomegaly and pulmonary vascular congestion with small bilateral pleural effusions. The appearance of the chest has slightly improved when compared to the previous study. **This report has been created using voice recognition software. It may contain minor errors which are inherent in voice recognition technology. **      Final report electronically signed by Dr Maddy Deras on 9/20/2020 2:28 AM      XR ABDOMEN FOR NG/OG/NE TUBE PLACEMENT   Final Result   1. There is an enteric tube present coursing below the diaphragm, the distal portions of which are excluded from the current examination. However the distal side-port projects over the gastric body. **This report has been created using voice recognition software. It may contain minor errors which are inherent in voice recognition technology. **      Final report electronically signed by Dr. Marixa Schafer on 9/19/2020 6:56 PM      XR CHEST PORTABLE   Final Result   1. Significantly improved aeration of the left lung. However there is partial exclusion of the left lateral thorax and examination. 2. Interval placement of endotracheal tube with tip overlying the trachea approximately 7.1 cm above the [place]. There are 3. Diffuse bilateral interstitial opacities are present overlying the visualized lung. This likely represents underlying pulmonary    edema.    4. Mild hazy opacity at the right lung base may indicate small underlying right-sided pleural fluid. **This report has been created using voice recognition software. It may contain minor errors which are inherent in voice recognition technology. **      Final report electronically signed by Dr. Josephine Bay on 9/19/2020 6:55 PM      XR CHEST PORTABLE   Final Result   Worsening appearance of the chest with now complete opacification of the left lung   Mucous plugging be a consideration. **This report has been created using voice recognition software. It may contain minor errors which are inherent in voice recognition technology. **      Final report electronically signed by Dr. Aakash Lira on 9/19/2020 3:14 AM      XR CHEST PORTABLE   Final Result   1. Mild to moderate cardiomegaly. Moderate-sized pleural effusion left side, slightly improved from earlier. Tiny effusion right side. No pneumothorax seen, however. 2. Moderate pneumonia/pulmonary edema scattered diffusely in the right lung and likely diffusely in the left lung is well. 3. Overall appearance of chest not significantly changed from earlier with the exception of slightly small pleural effusion left side. **This report has been created using voice recognition software. It may contain minor errors which are inherent in voice recognition technology. **      Final report electronically signed by Dr. Radha Clarke on 9/18/2020 2:52 PM      XR CHEST 1 VIEW   Final Result   No pneumothorax post left thoracentesis. **This report has been created using voice recognition software. It may contain minor errors which are inherent in voice recognition technology. **      Final report electronically signed by Dr. Tyrel Arellano on 9/18/2020 1:51 PM      US THORACENTESIS   Final Result      Successful ultrasound-guided thoracentesis with  1.4 L of fluid drained.  Patient was specifically informed that his large left pleural effusion would likely have electronically signed by Dr. Peña Lee on 9/16/2020 11:16 PM      XR CHEST PORTABLE   Final Result   Congestive failure with complete opacification of the left lower lobe likely due to combination of consolidation and pleural fluid. Probable posteriorly layered right effusion versus developing infiltrate            **This report has been created using voice recognition software. It may contain minor errors which are inherent in voice recognition technology. **      Final report electronically signed by Dr. Peña Lee on 9/16/2020 10:33 PM      XR CHEST PORTABLE    (Results Pending)   [place]    (Results Pending)       DVT prophylaxis: [] Lovenox                                 [x] SCDs                                 [] SQ Heparin                                 [] Encourage ambulation           [] Already on Anticoagulation     Code Status: Full Code    PT/OT Eval Status: Yes    Tele:   [x] yes             [] no    Active Hospital Problems    Diagnosis Date Noted    Mucus plugging of bronchi [J98.09]     Moderate malnutrition ([address] [E44.0] 09/18/2020    Malignant neoplasm of upper lobe of lung ([address] [C34.10]     Acute on chronic respiratory failure with hypoxia ([address] [J96.21]     Status post thoracentesis [Z98.890]     Bilateral pleural effusion [J90] 09/16/2020       Electronically signed by Abdullahi Mendoza MD on 9/22/2020 at 7:17 AM

## 2020-09-22 NOTE — PLAN OF CARE
Problem: Restraint Use - Nonviolent/Non-Self-Destructive Behavior:  Goal: Absence of restraint indications  Description: Absence of restraint indications  Outcome: Met This Shift  Note: No restraints on patient at this time. Problem: Falls - Risk of:  Goal: Will remain free from falls  Description: Will remain free from falls  Outcome: Ongoing  Note: Call light, overbed table, and belongings within reach. Bed alarm activated. Up with one assist to commode. Otherwise patient is bedrest due to oxygen needs. Patient included in hourly rounds. Problem: Skin Integrity:  Goal: Will show no infection signs and symptoms  Description: Will show no infection signs and symptoms  Outcome: Ongoing  Note: No signs of skin infection noted. No new skin breakdown noted upon skin assessment. Problem: Respiratory  Goal: O2 Sat > 90%  Outcome: Ongoing  Note: Patient on high flow. Desat throughout night to 87% when weaned. Patient currently on 90% FiO2 with 6 liters. Problem: GI  Goal: No bowel complications  Outcome: Ongoing  Note: No bowel complications noted. Bowel sounds are active. Patient had bowel movement overnight. Problem:   Goal: Adequate urinary output  Outcome: Ongoing  Note: Patient has capone in place. Adequate urine output. Problem: Nutrition  Goal: Optimal nutrition therapy  Outcome: Ongoing  Note: Patient on dental soft diet. Tolerating diet well. Problem: Musculor/Skeletal Functional Status  Goal: Highest potential functional level  Outcome: Ongoing  Note: Patients extremities all active and full movement. Slight weakness in bilateral lower extremities. Care plan reviewed with patient. Patient verbalizes understanding of the plan of care and contribute to goal setting. [Parents] : parents

## 2020-09-22 NOTE — CARE COORDINATION
DISCHARGE/PLANNING EVALUATION  9/22/20, 7:44 AM EDT    Reason for Referral:  \"medical deconditioning, needs placement upon discharge\"  Mental Status:  Alert and oriented  Decision Making:  Makes his own decisions  Family/Social/Home Environment:  SHERYL spoke to patient. He states he was living alone in a street level apartment with only 2 steps to get in. He has a license but was not driving. He had taken care of all of his needs but then states his \"drinking and smoking got him down\" but also states that \"it's history now\". He does not have children  Current Services including food security, transportation and housekeeping:   See above  Current Equipment:  He has home o2 but was not using it due to smoking. He did not use any DME to ambulate  Payment Source:  Medicare  Concerns or Barriers to Discharge:  He states he and his ex-wife are working out an agreement that he may come to live with her. She has a street level home and is retired as well as him. SW informed him that he may need some rehab and not able to go anywhere until his oxygen needs are less. SHERYL asked if he was going to get radiation treatments. His response is he wasn't sure but then stated he has lung cancer so probably he will be getting radiation. SHERYL advised he may need to be in rehab while getting these treatments. Post acute provider list with quality measures, geographic area and applicable managed care information provided. Questions regarding selection process answered:  Not at this time    Teach Back Method used with patient regarding care plan and needs  Patient verbalize understanding of the plan of care and contribute to goal setting. Patient goals, treatment preferences and discharge plan:   Final plan to be determined. He states his ex-wife will be in today. SHERYL advised that he needs to let the nurse know when she is here so we can discuss his plan.  SHERYL updated the nurse on this as well    Electronically signed by Libra Peres Dylan Baig on 9/22/2020 at 7:44 AM

## 2020-09-22 NOTE — PROGRESS NOTES
Discussed case with Dr. Rei Carrillo. Met with patient at length. He is currently on 100% FIO2 per high flow oxygen. He is alert and oriented x3. Patient understands underlying lung cancer and need for radiation. He understands current problem of recurring pleural fluid and increased oxygen needs. Discussed wishes moving forward. Code status options discussed and patient wants code status change to Limited Code: YES intubation (short term), NO CPR, NO Shock, NO resuscitative meds. We discussed wishes if these lung/health issues persist and do not improve. He states he would just want comfort at that point. He states \"I have lived a hard life and I am 68years old\". Patient does not have DPOA/Living will on file- we discussed this and he wants to complete while he is here. He wants his ex-wife, Vania Briceño, to be his DPOA. Spiritual care consult will be placed and primary RN updated. Encouragement and support provided to patient. Plan is to continue with current treatment he just wishes for No shock, No CPR, No resuscitative meds in the even of cardiac arrest.  Dr. Rei Carrillo updated and order received. Palliative care will continue to follow.

## 2020-09-22 NOTE — PLAN OF CARE
Continue the use of hfnc and nebulizer treatments to help improve lung sounds and overall respiratory status.   Problem: Impaired respiratory status  Goal: Lung sounds clear or within normal limits for patient  9/22/2020 1852 by Britney Vázquez RCP  Outcome: Ongoing

## 2020-09-22 NOTE — PLAN OF CARE
Problem: Impaired respiratory status  Goal: Lung sounds clear or within normal limits for patient  9/22/2020 4113 by Ventura Leavitt RCP  Outcome: Ongoing

## 2020-09-22 NOTE — PLAN OF CARE
Problem: Falls - Risk of:  Goal: Will remain free from falls  Description: Will remain free from falls  Outcome: Met This Shift  Note: No falls this shift. Alarms on patient. Patient up with staff help and oriented to own ability. Problem: Skin Integrity:  Goal: Will show no infection signs and symptoms  Description: Will show no infection signs and symptoms  Outcome: Met This Shift  Note: No new skin breakdown this shift. Patient able to turn self while in bed. Patient encouraged to turn while in bed. Problem:   Goal: Adequate urinary output  Outcome: Met This Shift  Note: Patient voiding adequately this shift. Problem: Impaired respiratory status  Goal: Lung sounds clear or within normal limits for patient  9/22/2020 2321 by Giovanni Whitley RCP  Outcome: Ongoing  9/22/2020 5717 by Yasmin Gallardo RCP  Outcome: Ongoing     Problem: Respiratory  Goal: O2 Sat > 90%  Outcome: Ongoing  Note: Patient on hi flow oxygen. Weaning patient as able. Patient on continuous pulse ox. Problem: GI  Goal: No bowel complications  Outcome: Ongoing  Note: Bowel sounds active. No BM this shift. Patient does not complain of constipation this shift. Problem: Nutrition  Goal: Optimal nutrition therapy  Outcome: Ongoing  Note: Patient eating most of meals this shift and tolerating well. Problem: Musculor/Skeletal Functional Status  Goal: Highest potential functional level  Outcome: Ongoing  Note: Therapy working with patient this shift. Patient unable to walk very far due to hi flow nasal cannula. Care plan reviewed with patient and ex-wife. Patient and ex-wife verbalize understanding of the plan of care and contribute to goal setting.

## 2020-09-22 NOTE — CARE COORDINATION
Update: left message for Radiation Oncology re: plan of care, diagnosis codes, will radiation be billed as OP, await reply; collaborated with Lori Robert  Electronically signed by Mitchell Van RN on 9/22/2020 at 9:05 AM     Update: patient cannot receive Radiation if on oxygen >15L (currently 100% FIO2/60L); patient in agreement for LTACH under [facility] benefit for HF oxygen weaning; collaborated with Josephine Wheeler, Radiation Oncology, Lori Robert, Attending  Electronically signed by Mitchell Van RN on 9/22/2020 at 11:23 AM    Update: Radiation Oncology prefers patient at [facility] for HF oxygen weaning before radiation can be initiated in future; collaborated with Donald Sandoval, [address]  Electronically signed by Mitchell Van RN on 9/22/2020 at 12:16 PM    Update; faxed [address] form to [facility]  Electronically signed by Mitchell Van RN on 9/22/2020 at 2:11 PM     Update: HF 80% FIO2/40L noted; collaborated with Gloria Wallace, Lori Nieves  Electronically signed by Mitchell Van RN on 9/22/2020 at 2:42 PM

## 2020-09-22 NOTE — PROGRESS NOTES
Formulation and discussion of sedation / procedure plans, risks, benefits, side effects and alternatives with patient and/or responsible adult completed. The patient was counseled at length about the risks of juni Covid-19 during their perioperative period and any recovery window from their procedure. The patient was made aware that juni Covid-19  may worsen their prognosis for recovering from their procedure  and lend to a higher morbidity and/or mortality risk. All material risks, benefits, and reasonable alternatives including postponing the procedure were discussed. The patient does wish to proceed with the procedure at this time.         Electronically signed by Spencer Arias MD on 9/22/2020 at 11:25 AM

## 2020-09-22 NOTE — PROGRESS NOTES
[facility]    Patient - Krista Mata   MRN -  [id number]   [facility]   - 1940      Date of Admission -  2020  8:51 PM  Date of evaluation -  2020  Room - -13/013-A   Hospital Day - 6  Consulting - Zhang Bauman MD Primary Care Physician - Constanza Head, DO     Problem List      Active Hospital Problems    Diagnosis Date Noted    Mucus plugging of bronchi [J98.09]     Moderate malnutrition ([address] [E44.0] 2020    Malignant neoplasm of upper lobe of lung ([address] [C34.10]     Acute on chronic respiratory failure with hypoxia (HCC) [J96.21]     Status post thoracentesis [Z98.890]     Bilateral pleural effusion [J90] 2020     Chief complaint   Shortness of breath and abnormal chest Xray  HPI    68 y.o. with PMH of HTN, CAD, HLD, COPD, PAF, brought in by his ex-wife due to ongoing generalized fatigue for the past 2-3 weeks. Patient lives alone but his ex-wife checked on him today, found patient unable to get up from the couch which prompted the ED visit. Patient was recently diagnosed with lung cancer stage I and was scheduled to initiate radiation therapy when he was found to have bilateral pleural effusions which lead to cancellation of his therapy. Patient is scheduled to get pleural effusions tapped at 0930 today (2020) and then is scheduled to get radiation therapy afterwards.     ED: presented afebrile and hemodynamically stable, hypertensive to 144/77. Placed on 2L NC, saturating >95%. Labs revealed Cr 1.7 (baseline 0.70), elevated LFTs, elevated trop at 0.038, anemia to 11.5, UA negative for infection. CXR showed congestion with complete opacification of the LLL likely due to combination of consolidation and pleural fluid. CT chest showed bilateral pleural effusions, left upper and lower lobe consolidations, nodular densities in the right lung, cardiomegaly with small pericardial effusion.  CT A/P revealed small amount of ascites in the pelvis, hepatomegaly, cholelithiasis, and bilateral non-obstructive nephrolithiasis. COVID negative.     Patient follows with Dr. Julio Jackson, radiation oncology. He has an extensive smoking history, 2-3 ppd since he was 8years old. Patient is supposed to be on oxygen at home but does not use it given that he continues to smoke. Patient reports that he drink alcohol daily with 3-4 beers daily and Julius Beam up to a pint/day.      Per patient and his ex-wife, he has been feeling increasingly fatigued, limiting his ability to move around. He reportedly had a fall out of his couch the morning prior to admission, he could not get up on his own so he had to call 911 who helped him get back up. He has not been able to eat or drink much in the past 2 to 3 weeks due to weakness. he reports having dyspnea at baseline which is recently progressively worsening. Patient reports having no chest pain, dizziness, diaphoresis, nausea vomiting, abdominal pain, dysuria, headaches, subjective fevers or chills. Per his ex-wife he always has some edema in his legs which she attributes to his venous problems. 9/19- Bronchoscopy with intubation moved to ICU  9/20- extubated and moved out of ICU  9/22- Left pigtail placed. Events in the last 24hours   - Reoccurring left opacification of chest- L pigtail today   - Left thoracentesis yesterday 9/22 1.4L removed  - Continues on HFNC 60% 90LPM  - Tmax 99.4  - Denies any SOB or chest discomfort   -All systems reviewed.      PMHx   Past Medical History      Diagnosis Date    Arthritis     Atrial fibrillation ([address]     CAD (coronary artery disease)     Cancer ([address]     COPD (chronic obstructive pulmonary disease) (HCC)     Hyperlipidemia     Hypertension       Past Surgical History        Procedure Laterality Date    EYE SURGERY      Cataracts removed    JOINT REPLACEMENT      Hip    LUNG BIOPSY      VASCULAR SURGERY      Stents in leg     Meds    Current Medications    Arformoterol Tartrate  15 mcg Nebulization BID    [Held by provider] enoxaparin  40 mg Subcutaneous Daily    magnesium replacement protocol   Other RX Placeholder    ipratropium-albuterol  1 ampule Inhalation Q4H WA    amLODIPine  10 mg Per NG tube Daily    carvedilol  6.25 mg Per NG tube BID WC    folic acid  1 mg Per NG tube Daily    methocarbamol  1,000 mg Per NG tube TID    spironolactone  25 mg Per NG tube Daily    sodium chloride flush  10 mL Intravenous 2 times per day    lidocaine  1 patch Transdermal Daily    [Held by provider] losartan  100 mg Oral Daily    [Held by provider] pravastatin  20 mg Oral Daily     PHENobarbital **OR** PHENobarbital **OR** PHENobarbital IVPB, albuterol, sodium chloride flush, acetaminophen **OR** acetaminophen, polyethylene glycol, promethazine **OR** ondansetron  IV Drips/Infusions    Home Medications  Medications Prior to Admission: vitamin D (CHOLECALCIFEROL) 25 MCG (1000 UT) TABS tablet, Take 2,000 Units by mouth daily  amLODIPine (NORVASC) 10 MG tablet, Take 10 mg by mouth daily  apixaban (ELIQUIS) 5 MG TABS tablet, Take by mouth 2 times daily  carvedilol (COREG) 6.25 MG tablet, Take 6.25 mg by mouth 2 times daily (with meals)  losartan (COZAAR) 100 MG tablet, Take 100 mg by mouth daily  methocarbamol (ROBAXIN) 500 MG tablet, Take 1,000 mg by mouth 3 times daily  Multiple Vitamins-Minerals (ICAPS AREDS 2 PO), Take 1 capsule by mouth 2 times daily  pravastatin (PRAVACHOL) 40 MG tablet, Take 20 mg by mouth daily  diclofenac sodium (VOLTAREN) 1 % GEL, Apply 4 g topically 4 times daily as needed for Pain  lidocaine (LIDODERM) 5 %, Place 1 patch onto the skin daily 12 hours on, 12 hours off.  sildenafil (VIAGRA) 100 MG tablet, Take 100 mg by mouth as needed for Erectile Dysfunction  spironolactone (ALDACTONE) 25 MG tablet, Take 25 mg by mouth daily  Diet    Diet NPO, After Midnight Exceptions are: Sips with Meds  Allergies    Patient has no known L/min     I/O        Intake/Output Summary (Last 24 hours) at 9/22/2020 1024  Last data filed at 9/22/2020 0934  Gross per 24 hour   Intake 530.21 ml   Output 875 ml   Net -344.79 ml     I/O last 3 completed shifts: In: 555.2 [P.O.:480; I.V.:75.2]  Out: 875 [Urine:875]   Patient Vitals for the past 96 hrs (Last 3 readings):   Weight   09/20/20 0340 159 lb 9.8 oz (72.4 kg)   09/19/20 1703 157 lb 13.6 oz (71.6 kg)   09/19/20 0307 141 lb 12.1 oz (64.3 kg)       Exam   Physical Exam   Constitutional: No distress on O2 per HFNC. Patient appears frail and elderly   Mouth/Throat: Oropharynx is clear and moist.  No oral thrush. Neck: Neck supple. No tracheal deviation present. Cardiovascular: Afib. S1 and S2 with no murmur. No peripheral edema  Pulmonary/Chest: Normal effort with bilateral air entry, Diminished breath sounds throughout with absent left breath sounds. No stridor. No respiratory distress. Patient exhibits no tenderness. No wheezing   Abdominal: Soft. Bowel sounds audible. No distension or tenderness to palp. Musculoskeletal: Moves all extremities; no cyanosis  Neurological: Patient is alert and oriented to person, place, and time. Skin: Warm and dry.  Pale    Labs  - Old records and notes have been reviewed in [facility]   ABG  Lab Results   Component Value Date    PH 7.44 09/21/2020    PO2 60 09/21/2020    PCO2 51 09/21/2020    HCO3 34 09/21/2020    O2SAT 91 09/21/2020     Lab Results   Component Value Date    IFIO2 15 09/18/2020     CBC  Recent Labs     09/22/20  0536   WBC 8.1   RBC 3.01*   HGB 9.4*   HCT 30.1*   .0*   MCH 31.2   MCHC 31.2*      MPV 10.8      BMP  Recent Labs     09/21/20  1527 09/21/20  2120 09/22/20  0810     --  142   K 3.7  --  4.2     --  104   CO2 30  --  30   BUN 19  --  21   CREATININE 0.9  --  0.8   GLUCOSE 167*  --  88   MG  --  1.6  --    CALCIUM 8.0*  --  8.2*     LFT  Recent Labs     09/22/20  0810   AST 15   ALT 31   BILITOT 0.5   ALKPHOS 99 organisms. Culture, Respiratory [[id number]]   Collected: 09/19/20 1800    Order Status: Completed  Specimen: Esophageal Brush Specimen Quantitative Count  Updated: 09/21/20 0711     Respiratory Culture  Endotracheal tube cultures do not correlate well with chest x-ray results and are a poor predictor of true pulmonary infections. Normal edgar- preliminary     Gram Stain Result  No segmented neutrophils observed. Rare epithelial cells observed. Rare gram negative bacilli. Rare gram positive cocci in pairs. Thoracentesis fluid analysis results. Performed on: 9/21/2020   Side: left side of chest .  By IR: Yes  Amount of pleural fluid drained:  1.4 L of fluid drained    Lab Results   Component Value Date    PHFL 7.63 09/21/2020    COLORU LIGHT YELLOW 09/21/2020    COLORU DK YELLOW 09/16/2020    LDFL 86 09/21/2020    PROTEINFL 1.4 09/21/2020     Lab Results   Component Value Date    PROT 4.4 09/21/2020     CYTOLOGY: Pending    Serum LDH: 216  LDH ratio i.e Pleural fluid LDH/ Serum LDH:  86/216=0.39  Total protein ratio i.e Pleural fluid Total protein/ Serum Total protein: 1.4/4.4=0.31    Pleural fluid cultures were negative so far. Gram stain of pleural fluid is negative for any organisms. Cultures      Pneumonia Panel, Molecular [[id number]]   Collected: 09/19/20 1830    Order Status: Completed  Specimen: BAL- Bronch.  Lavage  Updated: 09/19/20 2232     Source  ET aspirates     Specimen Acceptability  see below     Comment:  Acceptable, >=25 WBCs/LPF        Acinetobacter calcoaceticus-baumannii by PCR  Not Detected     Enterobacter cloacae complex by PCR  Not Detected     Escherichia coli by PCR  Not Detected     Haemophilus Influenzae by PCR  Not Detected     Klebsiella aerogenes by PCR  Not Detected     Klebsiella oxytoca by PCR  Not Detected     Klebsiella pneumoniae by PCR  Not Detected     Moraxella catarrhalis by PCR  Not Detected     Proteus species by PCR  Not Detected     Pseudomonas aeruginosa by PCR  Not Detected     Serratia marcescens by PCR  Not Detected     Staph aureus by PCR  Not Detected     Strep agalactiae by PCR  Not Detected     Strep pneumoniae by PCR  Not Detected     Strep pyogenes by PCR  Not Detected     Resistant gene ctx-m by PCR  NA     Resistant gene imp by PCR  NA     Resistant gene kpc by PCR  NA     Resistant gene meca/c & mrej by PCR  NA     Resistant gene ndm by PCR  NA     Resistant gene oxa-48-like by pcr  NA     Resistant gene vim by PCR  NA     Chlamydia pneumoniae By PCR  Not Detected     Legionella Pneumophilia By PCR  Not Detected     Mycoplasma pneumoniae by PCR  Not Detected     Adenovirus by PCR  Not Detected     CORONAVIRUS PCR  Not Detected     Metapneumovirus by PCR  Not Detected     Rhinovirus Enterovirus PCR  Not Detected     Influenza A by PCR  Not Detected     Influenza B by PCR  Not Detected     Parainfluenza virus by PCR  Not Detected     Respiratory Syncytial Virus by PCR  Not Detected      (-) MRSA  (-) VRE    Culture, Body Fluid [[id number]]   Collected: 09/18/20 1245    Order Status: Completed  Specimen: Body Fluid from Pleural Fluid  Updated: 09/20/20 0835     Body Fluid Culture, Sterile  No growth-preliminary No growth     Gram Stain Result  Few segmented neutrophils observed. No bacteria seen. performed on cytospun specimen      Culture, Urine [[id number]]   Collected: 09/19/20 1730    Order Status: Completed  Specimen: Urine  Updated: 09/20/20 0846     Urine Culture, Routine  No growth-preliminary      SARS-CoV-2, NAAT  NOT DETECTED  NOT DETECTED  Final  09/16/2020 10:00 PM        Echocardiogram   9/22/20- done pending read    Radiology    9/22/20  XR CHEST PORTABLE   1. Almost complete opacification of the left hemithorax, likely secondary to large pleural effusion. 2. Small right-sided pleural effusion. 3. Extensive right lung atelectasis/infiltrate.        9/21/2020   XR CHEST PORTABLE   There is complete opacification of the left hemithorax which may be due to a large pleural effusion. Appearance of the chest is worse when compared to the previous exam.     9/20/2020   XR CHEST PORTABLE   Cardiomegaly and pulmonary vascular congestion with small bilateral pleural effusions. The appearance of the chest has slightly improved when compared to the previous study. Assessment   Stage I lung Adenocarcinoma:  radiation oncology following. Current tobacco smoker 1PPD  - Follow with Dr. Lyna Mohs, MD  Acute on chronic hypoxic respiratory failure: 2/2 hx of lung cancer and mucous plugging   -9/19: Bronchoscopy Dr. Juana White MD- complete opacification of left lung   -9/20: Extubated this AM doing well on HFNC 60% 60LPM    Bilateral Pleural effusions:  Right thoracentesis done 9/17. Left thoracentesis done 9/18- CXR worse 9/19 with left deviation of trachea ? Mucous plugging- Pulmonary consulted with transfer to ICU and Bronchoscopy with Dr. Juana White MD 9/19  -9/20: cytology pending. CXR slightly improved - very small bilateral pleural effusions.  -9/21: Left thoracentesis; 1.4L removed  -9/22: Left pigtail placed per IR    Weakness: PT/OT- ECF placement at DC- likely r/t worsening pulmonary status over last several months     Anemia: multifactorial    Palliative Care to see when stable- currently Full Code. Hypotension:   -9/20: Levophed gtt DC; BP remains stable    ETOH Abuse: patient admits to drinking daily  9/20: Initiate Phenobarbital alcohol withdrawal protocol     Full Code: Palliative to see      Plan   -Bronchoscopy tomorrow in OR around 1200  -NPO after MN  -Rabia Campuzano his family were educated about my impression and plan. Rabia Robertss his family were informed about the risks and complications associated with bronchoscopy with biopsy including but not limited to pneumothorax with requirement of chest tube placement.  Rabia Campuzano his family  verbalizes understanding.   -Wean HF per protocol   -CXR portable AM  -Will follow cultures/cytology  -Duonebs every 4 hours w/a  -Manav herrera BID  -Palliative Care to see regarding CODE status and dx of lung CA  -Phenobarbital ETOH withdrawal protocol in place  -Zoe evaluation in place but was told our Oncology cannot treat over there- primary aware as well   -Smoking cessation   -Noted resulted cytology 9/17/20- no malignant cells   -ProBNP today   -Cytology sent today from left sided pleural fluid     Case discussed with nurse and patient/family. Questions and concerns addressed. Meds and orders reviewed. Electronically signed by DANIEL Hairston CNP on 9/22/2020 at 10:24 AM    Addendum by Dr. Charles Frye MD:  I have seen and examined the patient independently. Face to face evaluation and examination was performed. The above evaluation and note has been reviewed. Labs and radiographs were reviewed. I Have discussed with Ms. JUDE Hairston CNP about this patient in detail. The above assessment and plan has been reviewed. Please see my modifications mentioned below. My modifications:  Echocardiogram- done- Pending. Sep 22, 2020   PROCEDURE: XR CHEST PORTABLE 1514 hours   It is unclear whether the small left apical pneumothorax persists, though it certainly has not enlarged since previous. CT chest:  Sep 16, 2020   PROCEDURE: CT CHEST WO CONTRAST   Bilateral pleural effusions. Left upper and lower lobe consolidations. Nodular densities right lung. Cardiomegaly with small pericardial effusion. History obtained from MD Slade's note:   Because of his smoking history, Hermila Vargas has undergone regular CT imaging with low dose screening CT scans. In March 2019, a screening CT scan showed a 6 mm nodular opacity in the posterior aspect of the right upper lobe, and an additional density more inferiorly in the right lung with recommendation for follow-up in 3 months.   Repeat imaging in June 2019 showed persistence of 2 small areas of increased density in the right lung. PET scan in July 2019 showed minimal uptake in the 2 nodules. The possibility of well-differentiated malignancy was suggested, though the uptake did not meet PET criteria for neoplastic process. Another follow-up CT scan was obtained in October 2019, again showing the 2 areas of concern in the right upper lobe which were stable compared to June 2019, but new compared to January 2017. A subsequent follow-up CT scan of thorax from June 2020 showed that the right upper lobe nodularity abutting the major fissure was 11 x 10 x 9 mm, representing an increase compared to March 2019. More superiorly, the other lesion was 16 x 15 x 6 mm, stable compared to October 2019 but larger than January 2018. Both areas were considered possible neoplasms. Repeat PET scan in June 2020 showed no significant uptake in the right area of density adjacent to the major fissure. However, there is mild increased uptake in the right upper lung posteriorly laterally though with a relatively low SUV of 1.1. There is mild uptake in a pretracheal lymph node and in the right hilum of 1.9 SUV. Biopsy was recommended, and CT guided biopsy was performed in July 2020. The more superior lesion returned positive for non-small cell carcinoma, favoring adenocarcinoma. The lower lesion showed chronic inflammation and fibrosis. Martha Mi is considered a poor surgical candidate. He is being seen today 8/3/2024 evaluation and discussion regarding the role of stereotactic ablative radiation therapy in the management of his biopsy-proven right upper lung cancer.     Bronchoscopy performed by MD Mauricio on 09/19/2020:     Endobronchial findings     Trachea  Normal mucosa  Shirley  Normal mucosa     Right Main Stem Bronchus  Normal mucosa, mild mucoid secretions  Right Upper Lobe Bronchi Normal mucosa  Right Middle Lobe Bronchi  Normal mucosa  Right Lower Lobe Bronchi : Normal      Left Main Stem Bronchus mucus plug  Left Upper Lobe Bronchus, Upper Division Edematous mucosa, Extrinsic compression  Left Upper Lobe Bronchus, Lingula  Extrinsic compression  Left Lower Lobe Bronchus (including the Superior segment)  Extrinsic compression, Complete obstruction of the superior segment by tumor     Samples:      Bronchial washings: Culture        Avis Link MD  Pulmonary/CCM    All bronch cultures were negative so far. Code status- Limited code. No PET scan report is in the Epic  According to MD Lambert- Patient is in normal sinus rhythm. He did not want to place patient on any maintenance anticoagulation.     Jillian Ulloa MD 9/22/2020 5:20 PM

## 2020-09-22 NOTE — PROGRESS NOTES
[address] ICU STEPDOWN TELEMETRY 4K  Occupational Therapy  Daily Note  Time:    Time In: 1065  Time Out: 0911  Timed Code Treatment Minutes: 15 Minutes  Minutes: 15          Date: 2020  Patient Name: Abbie Bach,   Gender: male      Room: -13/013-A  MRN: [id number]  : 1940  (68 y.o.)  Referring Practitioner: Dr. Rosemarie Pyle  Diagnosis: B pleural effusion  Additional Pertinent Hx: Per ER note on 2020:80 y.o. male who presents chronic fatigue. The patient has been recently diagnosed with lung cancer he was scheduled to have radiation therapy. They think it is stage I only. When they were going to do his radiation therapy they found he had bilateral pleural effusions. He is scheduled tomorrow to have this tapped and taken off. He sees Dr. Sean Drew for radiation oncology. Patient is having decreased mobility at home. He sits in a chair all day long according to his ex-wife. He does not get up he has not been eating. He often defecates and urinates himself there. Patient is supposed to be on oxygen at home he does not often wear it. s/p thorocentesis on 2020    Restrictions/Precautions:  Restrictions/Precautions: Fall Risk  Position Activity Restriction  Other position/activity restrictions: high flow O2 - s/p intubation - with bronch      SUBJECTIVE: agreeable to exercises, declined to recliner  PAIN: 0/10: no c/o pain     COGNITION: Impulsive    ADL:   No ADL's completed this session. . Pt declined ADLs at this time. BALANCE:  Sitting Balance:  Stand By Assistance. x 8 min  Standing Balance: Contact Guard Assistance. -min A; unsteady; difficulty straightening up with inital stand    BED MOBILITY:  Supine to Sit: Contact Guard Assistance    Sit to Supine: Contact Guard Assistance      TRANSFERS:  Sit to Stand:  Contact Guard Assistance.  from EOB  Stand to sit: min A      ADDITIONAL ACTIVITIES:  Completed BUE AROM for shoulder flexion, horizontal abduction/adduction, elbow flexion/extension x 10 reps. Pt tolerated fair with RBs between exercises. Pt maintained O2 90-96% while on high flow. ASSESSMENT:     Activity Tolerance:  Patient tolerance of  treatment: fair. Discharge Recommendations: Continue to assess pending progress    Equipment Recommendations: Other: Monitor for RW use need & need for shower chair at home. Plan: Times per week: 5x  Current Treatment Recommendations: Balance Training, Functional Mobility Training, Endurance Training, Safety Education & Training, Self-Care / ADL    Patient Education  Patient Education: safety with mobility    Goals  Short term goals  Time Frame for Short term goals: until discharge  Short term goal 1: Complete various t/fs including BSC with CGA  Short term goal 2: Tolerate standing 1 min with CGA for increased ease of toileting  Short term goal 3: Complete LE dressing with adaptative strateiges prn  Short term goal 4: Complete mobility to [facility] or bed side chair with RW & CGA  Short term goal 5: Tolerate BUE AROM exercises x 10 reps with min RBs to increase UB endurance for BADL  Long term goals  Time Frame for Long term goals : No LTG set d/t short ELOS    Following session, patient left in safe position with all fall risk precautions in place.

## 2020-09-22 NOTE — FLOWSHEET NOTE
09/22/20 0540   Provider Notification   Reason for Communication Evaluate   Provider Name Heike Melara   Provider Notification Physician   Method of Communication Secure Message   Response Waiting for response   Notification Time [phone]     Updated pulm and hospitalist on patient. Patient is on high flow with 60L and 90% unable to wean due to desats into mid/low 80's. Patient had x-ray this morning at 0221. From the image it looks that there is no left lung as it looks white. Patient is not having any signs of distress. Left lung is very diminished throughout. On the 90% FiO2 he has maintained O2 sats >90%.

## 2020-09-23 ENCOUNTER — APPOINTMENT (OUTPATIENT)
Dept: GENERAL RADIOLOGY | Age: 80
DRG: 208 | End: 2020-09-23
Payer: OTHER GOVERNMENT

## 2020-09-23 LAB
ANAEROBIC CULTURE: NORMAL
BODY FLUID CULTURE, STERILE: NORMAL
GRAM STAIN RESULT: NORMAL

## 2020-09-23 PROCEDURE — 2700000000 HC OXYGEN THERAPY PER DAY

## 2020-09-23 PROCEDURE — 97530 THERAPEUTIC ACTIVITIES: CPT

## 2020-09-23 PROCEDURE — 94761 N-INVAS EAR/PLS OXIMETRY MLT: CPT

## 2020-09-23 PROCEDURE — 99233 SBSQ HOSP IP/OBS HIGH 50: CPT | Performed by: INTERNAL MEDICINE

## 2020-09-23 PROCEDURE — APPSS30 APP SPLIT SHARED TIME 16-30 MINUTES: Performed by: NURSE PRACTITIONER

## 2020-09-23 PROCEDURE — 94640 AIRWAY INHALATION TREATMENT: CPT

## 2020-09-23 PROCEDURE — 2580000003 HC RX 258: Performed by: STUDENT IN AN ORGANIZED HEALTH CARE EDUCATION/TRAINING PROGRAM

## 2020-09-23 PROCEDURE — 2060000000 HC ICU INTERMEDIATE R&B

## 2020-09-23 PROCEDURE — 94669 MECHANICAL CHEST WALL OSCILL: CPT

## 2020-09-23 PROCEDURE — 6370000000 HC RX 637 (ALT 250 FOR IP): Performed by: NURSE PRACTITIONER

## 2020-09-23 PROCEDURE — 71045 X-RAY EXAM CHEST 1 VIEW: CPT

## 2020-09-23 RX ADMIN — IPRATROPIUM BROMIDE AND ALBUTEROL SULFATE 1 AMPULE: .5; 3 SOLUTION RESPIRATORY (INHALATION) at 17:48

## 2020-09-23 RX ADMIN — ARFORMOTEROL TARTRATE 15 MCG: 15 SOLUTION RESPIRATORY (INHALATION) at 22:39

## 2020-09-23 RX ADMIN — SPIRONOLACTONE 25 MG: 25 TABLET ORAL at 09:37

## 2020-09-23 RX ADMIN — AMLODIPINE BESYLATE 10 MG: 10 TABLET ORAL at 09:37

## 2020-09-23 RX ADMIN — FOLIC ACID 1 MG: 1 TABLET ORAL at 09:37

## 2020-09-23 RX ADMIN — CARVEDILOL 6.25 MG: 6.25 TABLET, FILM COATED ORAL at 09:37

## 2020-09-23 RX ADMIN — IPRATROPIUM BROMIDE AND ALBUTEROL SULFATE 1 AMPULE: .5; 3 SOLUTION RESPIRATORY (INHALATION) at 13:34

## 2020-09-23 RX ADMIN — Medication 10 ML: at 09:37

## 2020-09-23 RX ADMIN — IPRATROPIUM BROMIDE AND ALBUTEROL SULFATE 1 AMPULE: .5; 3 SOLUTION RESPIRATORY (INHALATION) at 08:44

## 2020-09-23 RX ADMIN — ARFORMOTEROL TARTRATE 15 MCG: 15 SOLUTION RESPIRATORY (INHALATION) at 08:50

## 2020-09-23 RX ADMIN — IPRATROPIUM BROMIDE AND ALBUTEROL SULFATE 1 AMPULE: .5; 3 SOLUTION RESPIRATORY (INHALATION) at 22:39

## 2020-09-23 RX ADMIN — Medication 10 ML: at 20:15

## 2020-09-23 RX ADMIN — METHOCARBAMOL TABLETS 1000 MG: 500 TABLET, COATED ORAL at 20:14

## 2020-09-23 ASSESSMENT — PAIN SCALES - GENERAL
PAINLEVEL_OUTOF10: 0

## 2020-09-23 NOTE — CARE COORDINATION
Update: client has chosen Hospice per report; referral made prior today, await Hospice input, [employer] HF oxygen 75 FIO2/60L continued; collaborated with Barry White, [address] [facility], [facility], [address]  Electronically signed by Jordy Glasgow RN on 9/23/2020 at 11:44 AM    Update: patient reconsidering Bronchoscopy (planned for 9/24) per report; updated Benna Salts coming in at 1 per report; continue to monitor  Electronically signed by Jordy Glasgow RN on 9/23/2020 at 11:47 AM

## 2020-09-23 NOTE — FLOWSHEET NOTE
Advance Directive Consult: Advance Directive materials were provided to patient and explained. Patient is not ready to complete at this time, gave information for Spiritual Care to be contacted if further assistance is needed. 09/23/20 1041   Encounter Summary   Services provided to: Patient   Referral/Consult From: Scott;Nurse   Continue Visiting Yes  (9/23 AD info)   Complexity of Encounter Moderate   Length of Encounter 15 minutes   Spiritual Assessment Completed Yes   Advance Care Planning Yes   Spiritual/Jain   Type Spiritual support   Advance Directives (For Healthcare)   Healthcare Directive No, patient does not have an advance directive for healthcare treatment   Information on Healthcare Directives Requested No   Patient Requests Assistance Yes, referral made to    Care Plan:  Continue spiritual and emotional care for patient and family. Including prayers.

## 2020-09-23 NOTE — PROGRESS NOTES
Hospitalist Progress Note    Patient:  Coby Powell      Unit/Bed:4K-13/013-A    YOB: 1940    MRN: [id number]       Acct: [de-identified]     PCP: Raynell Gottron, DO    Date of Admission: 9/16/2020    Assessment/Plan:    Acute on Chronic Hypoxic Respiratory Failure: 2/2 to right upper lobe lung cancer and mucous plugging. On arrival, patient was placed on 2L NC and saturating > 95%. Patient was taken to the ICU on 09/19 and had bronchoscopy to remove mucous plug. Patient was extubated on 09/20 and placed on HFNC 60% 60 LPM.  Titrate O2 to keep SpO2 >90%. Wean O2 as tolerated  DuoNebs every 4 hours while awake  If condition deteriorates may need reintubation and transferred back to ICU  Stage I Lung Adenocarcinoma: Patient was recently diagnosed with lung cancer and was scheduled to initiate radiation therapy. Patient follows with Dr. Belle Siemens, radiation oncology. Patient has an extensive smoking history, 2-3 PPD since he was 8years old. Patient is supposed to be on oxygen at home but does not use it given that he continues to smoke. 09/23/2020 --> Patient declined bronchoscopy this morning after learning of the possible admission of intubation with mechanical ventilation post-bronch. Patient agreed to hospice consultation and was started on a general diet. Patient stated that 'I lived a good life' and that he doesn't want to undergo further testing. Later in the day, hospice spoke to patient's ex-wife, POA, who did not agree with placing patient in hospice and with canceling bronchoscopy. Ex-wife convinced patient who voiced agreeable to bronchoscopy knowing the great possibility of ICU admission and low chance of extubation. Per pulmonology, bronchoscopy rescheduled for tomorrow. Bilateral Pleural Effusions highly likely this is malignant: CT chest on 09/16/2020 showed bilateral pleural effusion. Left upper and lower lobe consolidations. Nodular densities right lung.   Cardiomegaly with small pericardial effusion. Pulmonology was consulted. Per their recommendation: right thoracentesis done on 09/17/2020. Left thoracentesis done on 09/18/2020 with 1.4L removed; consistent with transudate effusion. 09/22/2020 --> per pulmonology: Pigtail catheter placement by IR service. 9/23 - 600 mL out through the pigtail catheter today  Deconditioning/malnutrition: Patient lives alone and has not been doing well. Ex-wife lives 45 minutes away and expressed concern about his care. She is unable to provide care for him. PT/OT consulted  Dietitian consulted      Disposition:    [] Home       [] TCU       [] Rehab       [] Psych       [] SNF       [x] [facility]       [] Other-    Chief Complaint: Weakness    Hospital Course: This is a 37-year-old male with past medical history of HTN, CAD, HLD, COPD, PAF, brought in by his ex-wife due to ongoing generalized fatigue for the past 2 to 3 weeks. Patient's ex-wife found patient unable to get up from the couch which prompted the ED visit. Patient was recently diagnosed with lung cancer stage I and was scheduled to initiate radiation therapy. Patient follows with Dr. Ramiro Mckeon, radiation oncology. He has an extensive smoking history, 2-3 ppd since he was 8years old. Per patient and his ex-wife, he has been feeling increasingly fatigued, limiting his ability to move around. He reports reportedly had a fall out of his couch the morning prior to admission, he could not get up on his own so had to call 911 who helped him get back up. He has not been able to eat or drink much in the past 2 to 3 weeks due to weakness. He reports having dyspnea at baseline which is recently progressively worsening. In the ED, patient presented afebrile and hemodynamically stable, BP of 144/77. Placed on 2L NC, saturating > 95%. CXR showed congestion with complete opacification of the LLL likely due to combination of consolidation and pleural fluid.   CT chest showed bilateral pleural effusions, left upper and lower lobe consolidations, nodular densities in the right lung, cardiomegaly with small pericardial effusion. CT abdomen and pelvis revealed small amount of ascites in the pelvis, hepatomegaly, cholelithiasis, and bilateral nonobstructive nephrolithiasis. COVID negative. Subjective (past 24 hours):   No acute overnight events. Tmax of 99.6. Denies any chest pain, shortness of breath, palpitations, nausea, vomiting. No complaints at this time. Medications:  Reviewed    Infusion Medications    Scheduled Medications    Arformoterol Tartrate  15 mcg Nebulization BID    [Held by provider] enoxaparin  40 mg Subcutaneous Daily    magnesium replacement protocol   Other RX Placeholder    ipratropium-albuterol  1 ampule Inhalation Q4H WA    amLODIPine  10 mg Per NG tube Daily    carvedilol  6.25 mg Per NG tube BID WC    folic acid  1 mg Per NG tube Daily    methocarbamol  1,000 mg Per NG tube TID    spironolactone  25 mg Per NG tube Daily    sodium chloride flush  10 mL Intravenous 2 times per day    lidocaine  1 patch Transdermal Daily    [Held by provider] losartan  100 mg Oral Daily    [Held by provider] pravastatin  20 mg Oral Daily     PRN Meds: PHENobarbital **OR** PHENobarbital **OR** PHENobarbital IVPB, albuterol, sodium chloride flush, acetaminophen **OR** acetaminophen, polyethylene glycol, promethazine **OR** ondansetron      Intake/Output Summary (Last 24 hours) at 9/23/2020 1451  Last data filed at 9/23/2020 1422  Gross per 24 hour   Intake 1570 ml   Output 2350 ml   Net -780 ml       Diet:  DIET GENERAL; Low Sodium (2 GM); Dental Soft; Daily Fluid Restriction: 1500 ml    Exam:  BP (!) 116/55   Pulse 69   Temp 98.6 °F (37 °C) (Oral)   Resp 20   Ht 6' (1.829 m)   Wt 158 lb 14.4 oz (72.1 kg)   SpO2 96%   BMI 21.55 kg/m²     General appearance: No apparent distress, appears stated age and cooperative. On O2 per HFNC.   HEENT: Pupils equal, round, and reactive to light. Conjunctivae/corneas clear. Neck: Supple, with full range of motion. No jugular venous distention. Trachea midline. Respiratory:  Normal respiratory effort. Diminished breath sounds throughout with absent left breath sounds. Cardiovascular:  Normal S1/S2 without murmurs, rubs or gallops. Abdomen: Soft, non-tender, non-distended with normal bowel sounds. Musculoskeletal: passive and active ROM x 4 extremities. Skin: Skin color, texture, turgor normal.  No rashes or lesions. Neurologic:  Neurovascularly intact without any focal sensory/motor deficits. Psychiatric: Alert and oriented, thought content appropriate, normal insight  Capillary Refill: Brisk,< 3 seconds   Peripheral Pulses: +2 palpable, equal bilaterally       Labs:   Recent Labs     09/22/20  0536   WBC 8.1   HGB 9.4*   HCT 30.1*        Recent Labs     09/21/20  1527 09/22/20  0810    142   K 3.7 4.2    104   CO2 30 30   BUN 19 21   CREATININE 0.9 0.8   CALCIUM 8.0* 8.2*     Recent Labs     09/22/20  0810   AST 15   ALT 31   BILITOT 0.5   ALKPHOS 99     No results for input(s): INR in the last 72 hours. No results for input(s): Reyne Tongan in the last 72 hours. Microbiology:      Urinalysis:      Lab Results   Component Value Date    NITRU NEGATIVE 09/16/2020    WBCUA 2-4 09/16/2020    BACTERIA NONE SEEN 09/16/2020    RBCUA 3-5 09/16/2020    BLOODU NEGATIVE 09/16/2020    GLUCOSEU NEGATIVE 09/16/2020       Radiology:  XR CHEST PORTABLE   Final Result   Left pleural catheter with persistent opacification of the left    hemithorax. No pneumothorax. This document has been electronically signed by: Trinh Ponce MD on    09/23/2020 02:58 AM         XR CHEST PORTABLE   Final Result   It is unclear whether the small left apical pneumothorax persists, though it certainly has not enlarged since previous. **This report has been created using voice recognition software.  It may contain minor errors which are inherent in voice recognition technology. **      Final report electronically signed by Dr. Мария Wood on 9/22/2020 3:47 PM      XR CHEST PORTABLE   Final Result   A small left pneumothorax is seen. A short-term follow-up chest x-ray will be performed. **This report has been created using voice recognition software. It may contain minor errors which are inherent in voice recognition technology. **      Final report electronically signed by Dr. Мария Wood on 9/22/2020 11:02 AM      US THORACENTESIS WITH TUBE   Final Result      Successful ultrasound-guided thoracentesis with  0.55 L of fluid drained. **This report has been created using voice recognition software. It may contain minor errors which are inherent in voice recognition technology. **      Final report electronically signed by Dr. Мария Wood on 9/22/2020 11:25 AM      XR CHEST PORTABLE   Final Result   1. Almost complete opacification of the left hemithorax, likely secondary to large pleural effusion. 2. Small right-sided pleural effusion. 3. Extensive right lung atelectasis/infiltrate. **This report has been created using voice recognition software. It may contain minor errors which are inherent in voice recognition technology. **      Final report electronically signed by Dr. Honey Tabares on 9/22/2020 8:26 AM      VL DUP LOWER EXTREMITY VENOUS BILATERAL   Final Result      1. No sonographic evidence of lower extremity DVT. **This report has been created using voice recognition software. It may contain minor errors which are inherent in voice recognition technology. **      Final report electronically signed by Dr. Marleni Zarco on 9/21/2020 2:08 PM      XR CHEST PA INSPIRATION 1 VW   Final Result   No pneumothorax post left thoracentesis. **This report has been created using voice recognition software.  It may contain minor errors which are inherent in voice recognition technology. **      Final report electronically signed by Dr. Inderjit Pemberton on 9/21/2020 1:50 PM      US THORACENTESIS   Final Result      Successful ultrasound-guided thoracentesis with  1.4 L of fluid drained. **This report has been created using voice recognition software. It may contain minor errors which are inherent in voice recognition technology. **      Final report electronically signed by Dr. Inderjit Pemberton on 9/21/2020 12:33 PM      XR CHEST PORTABLE   Final Result   There is complete opacification of the left hemithorax which may be due to a large pleural effusion. Appearance of the chest is worse when compared to the previous exam.               **This report has been created using voice recognition software. It may contain minor errors which are inherent in voice recognition technology. **      Final report electronically signed by Dr Masoud Norris on 9/21/2020 6:20 AM      XR CHEST PORTABLE   Final Result   Cardiomegaly and pulmonary vascular congestion with small bilateral pleural effusions. The appearance of the chest has slightly improved when compared to the previous study. **This report has been created using voice recognition software. It may contain minor errors which are inherent in voice recognition technology. **      Final report electronically signed by Dr Masoud Norris on 9/20/2020 2:28 AM      XR ABDOMEN FOR NG/OG/NE TUBE PLACEMENT   Final Result   1. There is an enteric tube present coursing below the diaphragm, the distal portions of which are excluded from the current examination. However the distal side-port projects over the gastric body. **This report has been created using voice recognition software. It may contain minor errors which are inherent in voice recognition technology. **      Final report electronically signed by Dr. Giovana Najera on 9/19/2020 6:56 PM      XR CHEST PORTABLE   Final Result   1.  Significantly improved aeration of the left lung. However there is partial exclusion of the left lateral thorax and examination. 2. Interval placement of endotracheal tube with tip overlying the trachea approximately 7.1 cm above the malaika. There are 3. Diffuse bilateral interstitial opacities are present overlying the visualized lung. This likely represents underlying pulmonary    edema. 4. Mild hazy opacity at the right lung base may indicate small underlying right-sided pleural fluid. **This report has been created using voice recognition software. It may contain minor errors which are inherent in voice recognition technology. **      Final report electronically signed by Dr. Janae Chen on 9/19/2020 6:55 PM      XR CHEST PORTABLE   Final Result   Worsening appearance of the chest with now complete opacification of the left lung   Mucous plugging be a consideration. **This report has been created using voice recognition software. It may contain minor errors which are inherent in voice recognition technology. **      Final report electronically signed by Dr. Braxton Lorenz on 9/19/2020 3:14 AM      XR CHEST PORTABLE   Final Result   1. Mild to moderate cardiomegaly. Moderate-sized pleural effusion left side, slightly improved from earlier. Tiny effusion right side. No pneumothorax seen, however. 2. Moderate pneumonia/pulmonary edema scattered diffusely in the right lung and likely diffusely in the left lung is well. 3. Overall appearance of chest not significantly changed from earlier with the exception of slightly small pleural effusion left side. **This report has been created using voice recognition software. It may contain minor errors which are inherent in voice recognition technology. **      Final report electronically signed by Dr. Beckie Denny on 9/18/2020 2:52 PM      XR CHEST 1 VIEW   Final Result   No pneumothorax post left thoracentesis.             **This report has been created using voice recognition software. It may contain minor errors which are inherent in voice recognition technology. **      Final report electronically signed by Dr. Robert Lepe on 9/18/2020 1:51 PM      US THORACENTESIS   Final Result      Successful ultrasound-guided thoracentesis with  1.4 L of fluid drained. Patient was specifically informed that his large left pleural effusion would likely have to be drained in two procedures. **This report has been created using voice recognition software. It may contain minor errors which are inherent in voice recognition technology. **      Final report electronically signed by Dr. Robert Lepe on 9/18/2020 3:50 PM      US THORACENTESIS   Final Result      Successful ultrasound-guided thoracentesis with  0.92 L of fluid drained. **This report has been created using voice recognition software. It may contain minor errors which are inherent in voice recognition technology. **      Final report electronically signed by Dr. Robert Lepe on 9/17/2020 4:32 PM      XR CHEST PA INSPIRATION 1 VW   Final Result   No pneumothorax post right thoracentesis. Note should be made that internal medicine was contacted and stated they definitely warranted a right thoracentesis to proceed a left thoracentesis. **This report has been created using voice recognition software. It may contain minor errors which are inherent in voice recognition technology. **      Final report electronically signed by Dr. Robert Lepe on 9/17/2020 4:16 PM      CT CHEST WO CONTRAST   Final Result   Bilateral pleural effusions. Left upper and lower lobe consolidations. Nodular densities right lung. Cardiomegaly with small pericardial effusion. **This report has been created using voice recognition software. It may contain minor errors which are inherent in voice recognition technology. **      Final report electronically signed by Dr. Nathan Zambrano Les Mooney on 9/16/2020 11:13 PM      CT ABDOMEN PELVIS WO CONTRAST Additional Contrast? None   Final Result   1. Small amount of ascites in the pelvis   2. Hepatomegaly   3. Cholelithiasis   4. Bilateral nonobstructive nephrolithiasis. **This report has been created using voice recognition software. It may contain minor errors which are inherent in voice recognition technology. **      Final report electronically signed by Dr. Marce Newsome on 9/16/2020 11:16 PM      XR CHEST PORTABLE   Final Result   Congestive failure with complete opacification of the left lower lobe likely due to combination of consolidation and pleural fluid. Probable posteriorly layered right effusion versus developing infiltrate            **This report has been created using voice recognition software. It may contain minor errors which are inherent in voice recognition technology. **      Final report electronically signed by Dr. Marce Newsome on 9/16/2020 10:33 PM      XR CHEST PORTABLE    (Results Pending)       DVT prophylaxis: [] Lovenox                                 [x] SCDs                                 [] SQ Heparin                                 [] Encourage ambulation           [] Already on Anticoagulation     Code Status: Limited    PT/OT Eval Status: Yes    Tele:   [x] yes             [] no        Electronically signed by Aguilar Arroyo MD on 9/23/2020 at 2:51 PM

## 2020-09-23 NOTE — PLAN OF CARE
Problem: Impaired respiratory status  Goal: Lung sounds clear or within normal limits for patient  Outcome: Not Met This Shift   Breath sounds rhonchi, continuing duoneb and brovana as ordered. Patient did not want metaneb, states going to hospice.

## 2020-09-23 NOTE — PROGRESS NOTES
[facility]    Patient - Endy Vidal   MRN -  [id number]   [facility]   - 1940      Date of Admission -  2020  8:51 PM  Date of evaluation -  2020  Room - -013-A   Hospital Day - [address] Physician - Brock Parents, DO     Problem List      Active Hospital Problems    Diagnosis Date Noted    Mucus plugging of bronchi [J98.09]     Moderate malnutrition ([address] [E44.0] 2020    Malignant neoplasm of upper lobe of lung ([address] [C34.10]     Acute on chronic respiratory failure with hypoxia (HCC) [J96.21]     Status post thoracentesis [Z98.890]     Bilateral pleural effusion [J90] 2020     Chief complaint   Shortness of breath and abnormal chest Xray  HPI    68 y.o. with PMH of HTN, CAD, HLD, COPD, PAF, brought in by his ex-wife due to ongoing generalized fatigue for the past 2-3 weeks. Patient lives alone but his ex-wife checked on him today, found patient unable to get up from the couch which prompted the ED visit. Patient was recently diagnosed with lung cancer stage I and was scheduled to initiate radiation therapy when he was found to have bilateral pleural effusions which lead to cancellation of his therapy. Patient is scheduled to get pleural effusions tapped at 0930 today (2020) and then is scheduled to get radiation therapy afterwards.     ED: presented afebrile and hemodynamically stable, hypertensive to 144/77. Placed on 2L NC, saturating >95%. Labs revealed Cr 1.7 (baseline 0.70), elevated LFTs, elevated trop at 0.038, anemia to 11.5, UA negative for infection. CXR showed congestion with complete opacification of the LLL likely due to combination of consolidation and pleural fluid. CT chest showed bilateral pleural effusions, left upper and lower lobe consolidations, nodular densities in the right lung, cardiomegaly with small pericardial effusion.  CT A/P revealed small amount of ascites in the pelvis, hepatomegaly, cholelithiasis, and bilateral non-obstructive nephrolithiasis. COVID negative.     Patient follows with Dr. Song Browning, radiation oncology. He has an extensive smoking history, 2-3 ppd since he was 8years old. Patient is supposed to be on oxygen at home but does not use it given that he continues to smoke. Patient reports that he drink alcohol daily with 3-4 beers daily and Julius Beam up to a pint/day.      Per patient and his ex-wife, he has been feeling increasingly fatigued, limiting his ability to move around. He reportedly had a fall out of his couch the morning prior to admission, he could not get up on his own so he had to call 911 who helped him get back up. He has not been able to eat or drink much in the past 2 to 3 weeks due to weakness. he reports having dyspnea at baseline which is recently progressively worsening. Patient reports having no chest pain, dizziness, diaphoresis, nausea vomiting, abdominal pain, dysuria, headaches, subjective fevers or chills. Per his ex-wife he always has some edema in his legs which she attributes to his venous problems. 9/19- Bronchoscopy with intubation moved to ICU  9/20- extubated and moved out of ICU  9/22- Left pigtail placed. 9/23- CX bronchoscopy patient decided to transition to hospice then changed his mind  Events in the last 24hours   - Reoccurring left opacification of chest again today; 600mL out of pigtail  - hospice consulted then cancelled  - [facility] at [facility]  - Bronchoscopy for today cancelled- reschedule for tomorrow   - Continue on HFNC 65% 60LPM- 93%  -All systems reviewed.      PMHx   Past Medical History      Diagnosis Date    Arthritis     Atrial fibrillation ([address]     CAD (coronary artery disease)     Cancer ([address]     COPD (chronic obstructive pulmonary disease) (HCC)     Hyperlipidemia     Hypertension       Past Surgical History        Procedure Laterality Date    EYE SURGERY      Cataracts removed    JOINT REPLACEMENT      Hip    LUNG BIOPSY      VASCULAR SURGERY      Stents in leg     Meds    Current Medications    Arformoterol Tartrate  15 mcg Nebulization BID    [Held by provider] enoxaparin  40 mg Subcutaneous Daily    magnesium replacement protocol   Other RX Placeholder    ipratropium-albuterol  1 ampule Inhalation Q4H WA    amLODIPine  10 mg Per NG tube Daily    carvedilol  6.25 mg Per NG tube BID WC    folic acid  1 mg Per NG tube Daily    methocarbamol  1,000 mg Per NG tube TID    spironolactone  25 mg Per NG tube Daily    sodium chloride flush  10 mL Intravenous 2 times per day    lidocaine  1 patch Transdermal Daily    [Held by provider] losartan  100 mg Oral Daily    [Held by provider] pravastatin  20 mg Oral Daily     PHENobarbital **OR** PHENobarbital **OR** PHENobarbital IVPB, albuterol, sodium chloride flush, acetaminophen **OR** acetaminophen, polyethylene glycol, promethazine **OR** ondansetron  IV Drips/Infusions    Home Medications  Medications Prior to Admission: vitamin D (CHOLECALCIFEROL) 25 MCG (1000 UT) TABS tablet, Take 2,000 Units by mouth daily  amLODIPine (NORVASC) 10 MG tablet, Take 10 mg by mouth daily  apixaban (ELIQUIS) 5 MG TABS tablet, Take by mouth 2 times daily  carvedilol (COREG) 6.25 MG tablet, Take 6.25 mg by mouth 2 times daily (with meals)  losartan (COZAAR) 100 MG tablet, Take 100 mg by mouth daily  methocarbamol (ROBAXIN) 500 MG tablet, Take 1,000 mg by mouth 3 times daily  Multiple Vitamins-Minerals (ICAPS AREDS 2 PO), Take 1 capsule by mouth 2 times daily  pravastatin (PRAVACHOL) 40 MG tablet, Take 20 mg by mouth daily  diclofenac sodium (VOLTAREN) 1 % GEL, Apply 4 g topically 4 times daily as needed for Pain  lidocaine (LIDODERM) 5 %, Place 1 patch onto the skin daily 12 hours on, 12 hours off.  sildenafil (VIAGRA) 100 MG tablet, Take 100 mg by mouth as needed for Erectile Dysfunction  spironolactone (ALDACTONE) 25 MG tablet, Take 25 mg by mouth daily  Diet    DIET GENERAL; Low Sodium (2 GM); Dental Soft; Daily Fluid Restriction: 1500 ml  Allergies    Patient has no known allergies. Social History     Social History     Socioeconomic History    Marital status: Single     Spouse name: Not on file    Number of children: 0    Years of education: Not on file    Highest education level: Not on file   Occupational History    Not on file   Social Needs    Financial resource strain: Not on file    Food insecurity     Worry: Not on file     Inability: Not on file   [employer] needs     Medical: Not on file     Non-medical: Not on file   Tobacco Use    Smoking status: Current Every Day Smoker     Packs/day: 0.50     Years: 68.00     Pack years: 34.00     Types: Cigarettes     Start date: 1/1/1950    Smokeless tobacco: Never Used   Substance and Sexual Activity    Alcohol use: Yes     Alcohol/week: 3.0 standard drinks     Types: 3 Cans of beer per week    Drug use: Never    Sexual activity: Not on file   Lifestyle    Physical activity     Days per week: Not on file     Minutes per session: Not on file    Stress: Not on file   Relationships    Social connections     Talks on phone: Not on file     Gets together: Not on file     Attends Restoration service: Not on file     Active member of club or organization: Not on file     Attends meetings of clubs or organizations: Not on file     Relationship status: Not on file    Intimate partner violence     Fear of current or ex partner: Not on file     Emotionally abused: Not on file     Physically abused: Not on file     Forced sexual activity: Not on file   Other Topics Concern    Not on file   Social History Narrative    Not on file     Family History          Problem Relation Age of Onset    Liver Disease Mother        Vitals     height is 6' (1.829 m) and weight is 158 lb 14.4 oz (72.1 kg). His oral temperature is 99.6 °F (37.6 °C).  His blood pressure is 121/58   --  104  --    CO2 30  --  30  --    BUN 19  --  21  --    CREATININE 0.9  --  0.8  --    GLUCOSE 167*  --  88  --    MG  --  1.6  --  1.9   CALCIUM 8.0*  --  8.2*  --      LFT  Recent Labs     09/22/20  0810   AST 15   ALT 31   BILITOT 0.5   ALKPHOS 99     TROP  Lab Results   Component Value Date    TROPONINT 0.042 09/18/2020    TROPONINT 0.037 09/17/2020    TROPONINT 0.041 09/17/2020     BNP  No results for input(s): BNP in the last 72 hours. Lactic Acid  No results for input(s): LACTA in the last 72 hours. INR  No results for input(s): INR, PROTIME in the last 72 hours. PTT  Recent Labs     09/20/20  1626 09/20/20  2304 09/21/20  0541   APTT 27.4 101.9* 64.4*     Glucose  No results for input(s): POCGLU in the last 72 hours. UA   Recent Labs     09/21/20  [address]   . Thoracentesis fluid analysis results. Performed on: 9/17/2020  Side: right side of chest .  By IR: Yes  Amount of pleural fluid drained: 0.92L    Lab Results   Component Value Date    PHFL 7.63 09/21/2020    COLORU LIGHT YELLOW 09/21/2020    COLORU DK YELLOW 09/16/2020    LDFL 86 09/21/2020    PROTEINFL 1.4 09/21/2020     Lab Results   Component Value Date    PROT 4.7 09/22/2020     CYTOLOGY: FINAL RESULTS:   No malignant cells seen. Serum LDH: None  LDH ratio i.e Pleural fluid LDH/ Serum LDH: no serum LDH  Total protein ratio i.e Pleural fluid Total protein/ Serum Total protein: 1.2/5.2=0.23    Pleural fluid cultures were negative so far. Gram stain of pleural fluid is negative for any organisms. Thoracentesis fluid analysis results. Performed on: 9/18/20  Side: left side of chest .  By IR: Yes  Amount of pleural fluid drained: 1.4L    Lab Results   Component Value Date    PHFL 7.63 09/21/2020    COLORU LIGHT YELLOW 09/21/2020    COLORU DK YELLOW 09/16/2020    LDFL 86 09/21/2020    PROTEINFL 1.4 09/21/2020     Lab Results   Component Value Date    PROT 4.7 09/22/2020     CYTOLOGY: no malignant Not Detected     Klebsiella aerogenes by PCR  Not Detected     Klebsiella oxytoca by PCR  Not Detected     Klebsiella pneumoniae by PCR  Not Detected     Moraxella catarrhalis by PCR  Not Detected     Proteus species by PCR  Not Detected     Pseudomonas aeruginosa by PCR  Not Detected     Serratia marcescens by PCR  Not Detected     Staph aureus by PCR  Not Detected     Strep agalactiae by PCR  Not Detected     Strep pneumoniae by PCR  Not Detected     Strep pyogenes by PCR  Not Detected     Resistant gene ctx-m by PCR  NA     Resistant gene imp by PCR  NA     Resistant gene kpc by PCR  NA     Resistant gene meca/c & mrej by PCR  NA     Resistant gene ndm by PCR  NA     Resistant gene oxa-48-like by pcr  NA     Resistant gene vim by PCR  NA     Chlamydia pneumoniae By PCR  Not Detected     Legionella Pneumophilia By PCR  Not Detected     Mycoplasma pneumoniae by PCR  Not Detected     Adenovirus by PCR  Not Detected     CORONAVIRUS PCR  Not Detected     Metapneumovirus by PCR  Not Detected     Rhinovirus Enterovirus PCR  Not Detected     Influenza A by PCR  Not Detected     Influenza B by PCR  Not Detected     Parainfluenza virus by PCR  Not Detected     Respiratory Syncytial Virus by PCR  Not Detected      (-) MRSA  (-) VRE    Culture, Body Fluid [[id number]]   Collected: 09/18/20 1245    Order Status: Completed  Specimen: Body Fluid from Pleural Fluid  Updated: 09/20/20 0835     Body Fluid Culture, Sterile  No growth-preliminary No growth     Gram Stain Result  Few segmented neutrophils observed. No bacteria seen. performed on cytospun specimen      Culture, Urine [[id number]]   Collected: 09/19/20 1730    Order Status: Completed  Specimen: Urine  Updated: 09/20/20 0846     Urine Culture, Routine  No growth-preliminary      SARS-CoV-2, NAAT  NOT DETECTED  NOT DETECTED  Final  09/16/2020 10:00 PM        Echocardiogram       9/22/20  ECHOCARDIOGRAM COMPLETE 2D W DOPPLER W COLOR.    Conclusions      Summary   Ejection fraction is visually estimated at 50%. Overall left ventricular function is normal.   Mildly dilated right ventricle. Right ventricular systolic pressure of 50 mm Hg consistent with moderate   pulmonary hypertension. Mild mitral regurgitation is present. Signature      ----------------------------------------------------------------   Electronically signed by Rodger Sparks MD (Interpreting   physician) on 09/22/2020 at 07:52 PM      Radiology    09/23/2020   XR CHEST PORTABLE    Left pleural catheter with persistent opacification of the left hemithorax. No pneumothorax. 9/22/20  XR CHEST PORTABLE   1. Almost complete opacification of the left hemithorax, likely secondary to large pleural effusion. 2. Small right-sided pleural effusion. 3. Extensive right lung atelectasis/infiltrate. 9/21/2020   XR CHEST PORTABLE   There is complete opacification of the left hemithorax which may be due to a large pleural effusion. Appearance of the chest is worse when compared to the previous exam.     9/20/2020   XR CHEST PORTABLE   Cardiomegaly and pulmonary vascular congestion with small bilateral pleural effusions. The appearance of the chest has slightly improved when compared to the previous study. Assessment   Stage I lung Adenocarcinoma:  radiation oncology following. Current tobacco smoker 1PPD  - Follow with Dr. Emma Galarza MD  Acute on chronic hypoxic respiratory failure: 2/2 hx of lung cancer and mucous plugging   -9/19: Bronchoscopy Dr. Gertrude Monte MD- complete opacification of left lung   -9/20: Extubated this AM doing well on HFNC 60% 60LPM  Bilateral Pleural effusions:  Right thoracentesis done 9/17. Left thoracentesis done 9/18- CXR worse 9/19 with left deviation of trachea ? Mucous plugging- Pulmonary consulted with transfer to ICU and Bronchoscopy with Dr. Gertrude Monte MD 9/19  -9/20: cytology pending.  CXR slightly improved - very small bilateral pleural effusions.  -9/21: Left thoracentesis; 1.4L removed  -9/22: Left pigtail placed per IR  -9/23: once again complete opacification of left side even with left pigtial; 600mL out today     Weakness: PT/OT- ECF placement at DC- likely r/t worsening pulmonary status over last several months     Anemia: multifactorial    Palliative Care to see when stable- currently Full Code. 9/23: Hospice consulted then cancelled    Hypotension:   -9/20: Levophed gtt DC; BP remains stable    ETOH Abuse: patient admits to drinking daily  9/20: Initiate Phenobarbital alcohol withdrawal protocol     Full Code: Palliative to see      Plan   -Endy Vidal and ex-wife Emelia Gordon were educated and informed about bronchoscopy procedure,method, complicantions of procedure including but not limited to development of pneumothorax with requirement of chest tube placement. He agreed for the procedure and verbalizes understanding. Discussed possible admission to ICU on ventilator and how patient may not be able to be extubated. Patient has made it clear to me and his ex-wife Emelia Gordon that he DOES NOT want tracheostomy in the event that he cannot be weaned from the ventilator.   -Bronchoscopy tomorrow (9/24) time to be decided later will let floor/family know they would like to be present  -NPO after midnight  -Oncology consulted  -Wean HF per protocol   -Duonebs every 4 hours w/a  -Brovana neb BID  -Hospice consult cancelled  -Phenobarbital ETOH withdrawal protocol in place  -Please document on flow sheet output from left pigtail     Case discussed with nurse and patient/family. Questions and concerns addressed. Meds and orders reviewed. Electronically signed by DANIEL Shoemaker - CNP on 9/23/2020 at 9:18 AM    Addendum by Dr. Darrow Sacks, MD:  I have seen and examined the patient independently. Face to face evaluation and examination was performed. The above evaluation and note has been reviewed. Labs and radiographs were reviewed. I Have discussed with Ms. Gerda Henriquez DANIEL Ashley- CNP about this patient in detail. The above assessment and plan has been reviewed. Please see my modifications mentioned below. My modifications:  He is still on Hi Flow at 60LPM 78%. I spoke with the patient in presence of his RN Ms. Underwood Both at bed side. I explained in detail regarding his current clinical condition and poor prognosis. I also informed him about the requirement of intubation for the planned bronchoscopy and not able to wean from ventilator. At the end of discussion, he told me that he did not want to go for bronchoscopy and want to speak with Hospice sevice to arrange for in patient or out patient hospice service. He verbalizes understanding.  -Discussed with Dr. Genesis Shi regarding patient condition and management plan over phone. Plan:  Please cancel bronchoscopy for now.   Awaiting hospice eval.    Lawrence Tam MD 9/23/2020 3:00 PM

## 2020-09-23 NOTE — PROGRESS NOTES
[address] [address]  INPATIENT PHYSICAL THERAPY  DAILY NOTE  [facility] ICU STEPDOWN TELEMETRY 4K - 4K-13/013-A    Time In: 3298  Time Out: 1104  Timed Code Treatment Minutes: 19 Minutes  Minutes: 19          Date: 2020  Patient Name: Delmis Vogt,  Gender:  male        MRN: [id number]  : 1940  (68 y.o.)     Referring Practitioner: Dr. Kindra Orta  Diagnosis: pleural effusion  Additional Pertinent Hx: admit with above diagnosis, lung cancer, bilateral pleural effusion s/p thoracentesis on 20, anemia, weakness     Prior Level of Function:  Lives With: Alone  Type of Home: Apartment(duplex)  Home Layout: One level  Home Access: Stairs to enter with rails  Entrance Stairs - Number of Steps: 3  Home Equipment: Cane, Rolling walker   Bathroom Shower/Tub: Tub/Shower unit  Bathroom Toilet: Standard  Bathroom Equipment: Grab bars in shower    ADL Assistance: Needs assistance  Ambulation Assistance: Independent  Transfer Assistance: Independent  Additional Comments: Pt reports using cane at home. Has help once in a while with ADLs. per pt if he talks with wife will see if she can come down to help him at discharge, per pt has O2 at home but doesn't wear it like he is supposed to    Restrictions/Precautions:  Restrictions/Precautions: Fall Risk  Position Activity Restriction  Other position/activity restrictions: high flow O2 - s/p intubation - with bronch    SUBJECTIVE: RN approved session. Patient laying in bed upon arrival. Patient on high flow O2. Patient declined therapy session, reports he wants to go home with hospice services. Discussed POC. Patient then requested to use [facility] and return to bed.      PAIN: not rated    OBJECTIVE:  Bed Mobility:  Supine to Sit: Stand By Assistance, with head of bed raised, with increased time for completion  Sit to Supine: Stand By Assistance, with head of bed raised, with increased time for completion   Scooting: Stand By Assistance    Transfers:  Sit to Stand: Contact Guard Assistance, assist with multiple lines  Stand to [facility] 68, assist with multiple lines  Stand Pivot:Contact Guard Assistance, assist with multiple lines, bed<>BSC, no device    Balance:  Dynamic Sitting Balance: Modified Independent    Exercise:  Patient declined exercises. Functional Outcome Measures: Completed  AM-PAC Inpatient Mobility without Stair Climbing Raw Score : 16  AM-PAC Inpatient without Stair Climbing T-Scale Score : 45.54    ASSESSMENT:  Assessment: Patient progressing toward established goals. Activity Tolerance:  Patient tolerance of  treatment: good. Equipment Recommendations:Equipment Needed: No  Discharge Recommendations:  Continue to assess pending progress    Plan: Times per week: 5X GM  Times per day: Daily  Specific instructions for Next Treatment: therex and mobility    Patient Education  Patient Education: Plan of Care, Bed Mobility, Transfers    Goals:  Patient goals : return home  Short term goals  Time Frame for Short term goals: by discharge  Short term goal 1: bed mobility with MOD I to get in/out of bed  Short term goal 2: transfer with MOD I to get in/out of chairs  Short term goal 3: amb >100'x1 with RW and MOD I to walk safely in apt  Short term goal 4: negotiate 3 steps with HR and S to enter apt safely  Long term goals  Time Frame for Long term goals : no LTGs set secondary to short ELOS    Following session, patient left in safe position with all fall risk precautions in place.

## 2020-09-24 ENCOUNTER — CLINICAL DOCUMENTATION (OUTPATIENT)
Dept: RADIATION ONCOLOGY | Age: 80
End: 2020-09-24

## 2020-09-24 ENCOUNTER — APPOINTMENT (OUTPATIENT)
Dept: GENERAL RADIOLOGY | Age: 80
DRG: 208 | End: 2020-09-24
Payer: OTHER GOVERNMENT

## 2020-09-24 LAB
ERYTHROCYTE [DISTWIDTH] IN BLOOD BY AUTOMATED COUNT: 14.3 % (ref 11.5–14.5)
ERYTHROCYTE [DISTWIDTH] IN BLOOD BY AUTOMATED COUNT: 54.4 FL (ref 35–45)
HCT VFR BLD CALC: 30 % (ref 42–52)
HEMOGLOBIN: 9.2 GM/DL (ref 14–18)
MCH RBC QN AUTO: 31.5 PG (ref 26–33)
MCHC RBC AUTO-ENTMCNC: 30.7 GM/DL (ref 32.2–35.5)
MCV RBC AUTO: 102.7 FL (ref 80–94)
PLATELET # BLD: 184 THOU/MM3 (ref 130–400)
PMV BLD AUTO: 10.9 FL (ref 9.4–12.4)
RBC # BLD: 2.92 MILL/MM3 (ref 4.7–6.1)
WBC # BLD: 10.2 THOU/MM3 (ref 4.8–10.8)

## 2020-09-24 PROCEDURE — 94761 N-INVAS EAR/PLS OXIMETRY MLT: CPT

## 2020-09-24 PROCEDURE — 6370000000 HC RX 637 (ALT 250 FOR IP): Performed by: NURSE PRACTITIONER

## 2020-09-24 PROCEDURE — 2060000000 HC ICU INTERMEDIATE R&B

## 2020-09-24 PROCEDURE — 36415 COLL VENOUS BLD VENIPUNCTURE: CPT

## 2020-09-24 PROCEDURE — 2580000003 HC RX 258: Performed by: STUDENT IN AN ORGANIZED HEALTH CARE EDUCATION/TRAINING PROGRAM

## 2020-09-24 PROCEDURE — 99232 SBSQ HOSP IP/OBS MODERATE 35: CPT | Performed by: INTERNAL MEDICINE

## 2020-09-24 PROCEDURE — 85027 COMPLETE CBC AUTOMATED: CPT

## 2020-09-24 PROCEDURE — APPSS30 APP SPLIT SHARED TIME 16-30 MINUTES: Performed by: NURSE PRACTITIONER

## 2020-09-24 PROCEDURE — 71045 X-RAY EXAM CHEST 1 VIEW: CPT

## 2020-09-24 PROCEDURE — 94669 MECHANICAL CHEST WALL OSCILL: CPT

## 2020-09-24 PROCEDURE — 99233 SBSQ HOSP IP/OBS HIGH 50: CPT | Performed by: INTERNAL MEDICINE

## 2020-09-24 PROCEDURE — 94640 AIRWAY INHALATION TREATMENT: CPT

## 2020-09-24 RX ORDER — MORPHINE SULFATE 2 MG/ML
1 INJECTION, SOLUTION INTRAMUSCULAR; INTRAVENOUS
Status: DISCONTINUED | OUTPATIENT
Start: 2020-09-24 | End: 2020-10-01 | Stop reason: HOSPADM

## 2020-09-24 RX ORDER — GLYCOPYRROLATE 0.2 MG/ML
0.2 INJECTION INTRAMUSCULAR; INTRAVENOUS 2 TIMES DAILY PRN
Status: DISCONTINUED | OUTPATIENT
Start: 2020-09-24 | End: 2020-09-26 | Stop reason: SDUPTHER

## 2020-09-24 RX ORDER — LORAZEPAM 2 MG/ML
1 INJECTION INTRAMUSCULAR
Status: DISCONTINUED | OUTPATIENT
Start: 2020-09-24 | End: 2020-10-01 | Stop reason: HOSPADM

## 2020-09-24 RX ADMIN — ARFORMOTEROL TARTRATE 15 MCG: 15 SOLUTION RESPIRATORY (INHALATION) at 08:25

## 2020-09-24 RX ADMIN — IPRATROPIUM BROMIDE AND ALBUTEROL SULFATE 1 AMPULE: .5; 3 SOLUTION RESPIRATORY (INHALATION) at 21:08

## 2020-09-24 RX ADMIN — IPRATROPIUM BROMIDE AND ALBUTEROL SULFATE 1 AMPULE: .5; 3 SOLUTION RESPIRATORY (INHALATION) at 08:25

## 2020-09-24 RX ADMIN — Medication 10 ML: at 11:11

## 2020-09-24 RX ADMIN — IPRATROPIUM BROMIDE AND ALBUTEROL SULFATE 1 AMPULE: .5; 3 SOLUTION RESPIRATORY (INHALATION) at 13:34

## 2020-09-24 RX ADMIN — IPRATROPIUM BROMIDE AND ALBUTEROL SULFATE 1 AMPULE: .5; 3 SOLUTION RESPIRATORY (INHALATION) at 17:20

## 2020-09-24 RX ADMIN — ARFORMOTEROL TARTRATE 15 MCG: 15 SOLUTION RESPIRATORY (INHALATION) at 21:08

## 2020-09-24 ASSESSMENT — PAIN SCALES - GENERAL
PAINLEVEL_OUTOF10: 0
PAINLEVEL_OUTOF10: 0

## 2020-09-24 NOTE — PLAN OF CARE
Problem: Impaired respiratory status  Goal: Lung sounds clear or within normal limits for patient  9/24/2020 1274 by Shorty Acosta RCP  Outcome: Ongoing    Note:  Patient lung sounds are considered normal for their current lung condition. No signs of distress noted.  Current treatment regimen appropriate       See comments under breathing txs

## 2020-09-24 NOTE — CARE COORDINATION
Update: remains on [employer] HF oxygen 100% SPX5/49R; Dawson precerted today; however client refused Bronchoscopy/possible trach/ventilator per report;  Hospice to see, await their input; collaborated with SHERYL Pitts  Electronically signed by Efrem Grey RN on 9/24/2020 at 10:13 AM

## 2020-09-24 NOTE — FLOWSHEET NOTE
09/24/20 0554   Provider Notification   Reason for Communication Evaluate  (High flow maxed out on 60L with 100% FiO2.)   Provider Name [facility]   Provider Notification Advance Practice Clinician (CNS, NP, CNM, CRNA, PA)   Method of Communication Secure Message   Response Waiting for response   Notification Time [id number]     Messaged hospitalist regarding increased oxygen needs. Patient was desating and now maxed out on high flow with 60L and Fio2 100%. Sats are in the low 90s. Will continue to monitor patient. No distress at this time.

## 2020-09-24 NOTE — FLOWSHEET NOTE
Advance Directive Consult: Advance Directive education provided and forms were completed. Copies placed in chart and original returned to patient. Physician notified. Copy sent to Medical Records. 09/24/20 1608   Encounter Summary   Services provided to: Patient and family together   Referral/Consult From: Maddie 48  (X wife)   Continue Visiting Yes  (9/24 AD done)   Complexity of Encounter Moderate   Length of Encounter 45 minutes   Spiritual/Cheondoism   Type Spiritual support   Advance Directives (For Healthcare)   Healthcare Directive Yes, patient has an advance directive for healthcare treatment   Type of Healthcare Directive Health care treatment directive   Copy in Chart Yes, copy in chart   Chart Copy Status : Current; Active   Date Reviewed and Current: 09/24/20   Information on Healthcare Directives Requested Yes   Patient Requests Assistance Yes, referral made to    Advance Directives Documents given;Documents explained; Healthcare power of attornery completed; Copy in Media tab;Copy in paper [address] Agent's Name x wife [address] [employer] Agent's Phone Number [phone]   If you are unable to speak for yourself, does your Healthcare Agent or Legal Spokesperson know your healthcare wishes? Yes        09/24/20 1608   Encounter Summary   Services provided to: Patient and family together   Referral/Consult From: Maddie 50  (X wife)   Continue Visiting Yes  (9/24 AD done)   Complexity of Encounter Moderate   Length of Encounter 45 minutes   Spiritual/Cheondoism   Type Spiritual support   Advance Directives (For Healthcare)   Healthcare Directive Yes, patient has an advance directive for healthcare treatment   Type of Healthcare Directive Health care treatment directive   Copy in Chart Yes, copy in chart   Chart Copy Status : Current; Active   Date Reviewed and Current: 09/24/20 Information on Healthcare Directives Requested Yes   Patient Requests Assistance Yes, referral made to    Advance Directives Documents given;Documents explained; Healthcare power of attornery completed; Copy in Media tab;Copy in paper [address] Agent's Name x wife [address] Agent's Phone Number [phone]   If you are unable to speak for yourself, does your Healthcare Agent or Legal Spokesperson know your healthcare wishes? Yes   met John Paul Mcbride x wife who lives outside of [facility].

## 2020-09-24 NOTE — PROGRESS NOTES
[facility]    Patient - Lily Hu   MRN -  [id number]   [facility]   - 1940      Date of Admission -  2020  8:51 PM  Date of evaluation -  2020  Room - -013-A   [facility] - Jennifer Huntley, DO     Problem List      Active Hospital Problems    Diagnosis Date Noted    Mucus plugging of bronchi [J98.09]     Moderate malnutrition ([address] [E44.0] 2020    Malignant neoplasm of upper lobe of lung ([address] [C34.10]     Acute on chronic respiratory failure with hypoxia (HCC) [J96.21]     Status post thoracentesis [Z98.890]     Bilateral pleural effusion [J90] 2020     Chief complaint   Shortness of breath and abnormal chest Xray  HPI    68 y.o. with PMH of HTN, CAD, HLD, COPD, PAF, brought in by his ex-wife due to ongoing generalized fatigue for the past 2-3 weeks. Patient lives alone but his ex-wife checked on him today, found patient unable to get up from the couch which prompted the ED visit. Patient was recently diagnosed with lung cancer stage I and was scheduled to initiate radiation therapy when he was found to have bilateral pleural effusions which lead to cancellation of his therapy. Patient is scheduled to get pleural effusions tapped at 0930 today (2020) and then is scheduled to get radiation therapy afterwards.     ED: presented afebrile and hemodynamically stable, hypertensive to 144/77. Placed on 2L NC, saturating >95%. Labs revealed Cr 1.7 (baseline 0.70), elevated LFTs, elevated trop at 0.038, anemia to 11.5, UA negative for infection. CXR showed congestion with complete opacification of the LLL likely due to combination of consolidation and pleural fluid. CT chest showed bilateral pleural effusions, left upper and lower lobe consolidations, nodular densities in the right lung, cardiomegaly with small pericardial effusion.  CT A/P revealed small amount of ascites in the pelvis, hepatomegaly, cholelithiasis, and bilateral non-obstructive nephrolithiasis. COVID negative.     Patient follows with Dr. Belle Siemens, radiation oncology. He has an extensive smoking history, 2-3 ppd since he was 8years old. Patient is supposed to be on oxygen at home but does not use it given that he continues to smoke. Patient reports that he drink alcohol daily with 3-4 beers daily and Julius Beam up to a pint/day.      Per patient and his ex-wife, he has been feeling increasingly fatigued, limiting his ability to move around. He reportedly had a fall out of his couch the morning prior to admission, he could not get up on his own so he had to call 911 who helped him get back up. He has not been able to eat or drink much in the past 2 to 3 weeks due to weakness. he reports having dyspnea at baseline which is recently progressively worsening. Patient reports having no chest pain, dizziness, diaphoresis, nausea vomiting, abdominal pain, dysuria, headaches, subjective fevers or chills. Per his ex-wife he always has some edema in his legs which she attributes to his venous problems. 9/19- Bronchoscopy with intubation moved to ICU  9/20- extubated and moved out of ICU  9/22- Left pigtail placed. 9/23- CX bronchoscopy patient decided to transition to hospice then changed his mind  9/24- 600mL yesterday output L pigtail   Events in the last 24hours   - CXR improved today  - Maxed out on HFNC  - No bronch today- re consulting hospice   - patient is Asymptomatic for grave condition  -All systems reviewed.      PMHx   Past Medical History      Diagnosis Date    Arthritis     Atrial fibrillation ([address]     CAD (coronary artery disease)     Cancer ([address]     COPD (chronic obstructive pulmonary disease) (HCC)     Hyperlipidemia     Hypertension       Past Surgical History        Procedure Laterality Date    EYE SURGERY      Cataracts removed    JOINT REPLACEMENT      Hip    LUNG BIOPSY      VASCULAR SURGERY      Stents in leg     Meds    Current Medications    Arformoterol Tartrate  15 mcg Nebulization BID    [Held by provider] enoxaparin  40 mg Subcutaneous Daily    magnesium replacement protocol   Other RX Placeholder    ipratropium-albuterol  1 ampule Inhalation Q4H WA    amLODIPine  10 mg Per NG tube Daily    carvedilol  6.25 mg Per NG tube BID WC    folic acid  1 mg Per NG tube Daily    methocarbamol  1,000 mg Per NG tube TID    spironolactone  25 mg Per NG tube Daily    sodium chloride flush  10 mL Intravenous 2 times per day    lidocaine  1 patch Transdermal Daily    [Held by provider] losartan  100 mg Oral Daily    [Held by provider] pravastatin  20 mg Oral Daily     PHENobarbital **OR** PHENobarbital **OR** PHENobarbital IVPB, albuterol, sodium chloride flush, acetaminophen **OR** acetaminophen, polyethylene glycol, promethazine **OR** ondansetron  IV Drips/Infusions    Home Medications  Medications Prior to Admission: vitamin D (CHOLECALCIFEROL) 25 MCG (1000 UT) TABS tablet, Take 2,000 Units by mouth daily  amLODIPine (NORVASC) 10 MG tablet, Take 10 mg by mouth daily  apixaban (ELIQUIS) 5 MG TABS tablet, Take by mouth 2 times daily  carvedilol (COREG) 6.25 MG tablet, Take 6.25 mg by mouth 2 times daily (with meals)  losartan (COZAAR) 100 MG tablet, Take 100 mg by mouth daily  methocarbamol (ROBAXIN) 500 MG tablet, Take 1,000 mg by mouth 3 times daily  Multiple Vitamins-Minerals (ICAPS AREDS 2 PO), Take 1 capsule by mouth 2 times daily  pravastatin (PRAVACHOL) 40 MG tablet, Take 20 mg by mouth daily  diclofenac sodium (VOLTAREN) 1 % GEL, Apply 4 g topically 4 times daily as needed for Pain  lidocaine (LIDODERM) 5 %, Place 1 patch onto the skin daily 12 hours on, 12 hours off.  sildenafil (VIAGRA) 100 MG tablet, Take 100 mg by mouth as needed for Erectile Dysfunction  spironolactone (ALDACTONE) 25 MG tablet, Take 25 mg by mouth daily  Diet    Diet NPO, After Midnight Exceptions are: Sips with Meds  Allergies    Patient has no known allergies. Social History     Social History     Socioeconomic History    Marital status: Single     Spouse name: Not on file    Number of children: 0    Years of education: Not on file    Highest education level: Not on file   Occupational History    Not on file   Social Needs    Financial resource strain: Not on file    Food insecurity     Worry: Not on file     Inability: Not on file   [employer] needs     Medical: Not on file     Non-medical: Not on file   Tobacco Use    Smoking status: Current Every Day Smoker     Packs/day: 0.50     Years: 68.00     Pack years: 34.00     Types: Cigarettes     Start date: 1/1/1950    Smokeless tobacco: Never Used   Substance and Sexual Activity    Alcohol use: Yes     Alcohol/week: 3.0 standard drinks     Types: 3 Cans of beer per week    Drug use: Never    Sexual activity: Not on file   Lifestyle    Physical activity     Days per week: Not on file     Minutes per session: Not on file    Stress: Not on file   Relationships    Social connections     Talks on phone: Not on file     Gets together: Not on file     Attends Yazdanism service: Not on file     Active member of club or organization: Not on file     Attends meetings of clubs or organizations: Not on file     Relationship status: Not on file    Intimate partner violence     Fear of current or ex partner: Not on file     Emotionally abused: Not on file     Physically abused: Not on file     Forced sexual activity: Not on file   Other Topics Concern    Not on file   Social History Narrative    Not on file     Family History          Problem Relation Age of Onset    Liver Disease Mother        Vitals     height is 6' (1.829 m) and weight is 154 lb 3.2 oz (69.9 kg). His oral temperature is 99.4 °F (37.4 °C). His blood pressure is 124/79 and his pulse is 72. His respiration is 20 and oxygen saturation is 92%.    Body mass index is 20.91 kg/m².    SUPPLEMENTAL O2: O2 Flow Rate (L/min): 60 L/min     I/O        Intake/Output Summary (Last 24 hours) at 9/24/2020 0916  Last data filed at 9/24/2020 0334  Gross per 24 hour   Intake 1210 ml   Output 1400 ml   Net -190 ml     I/O last 3 completed shifts: In: 1210 [P.O.:1200; I.V.:10]  Out: 1400 [Urine:800; Drains:600]   Patient Vitals for the past 96 hrs (Last 3 readings):   Weight   09/24/20 0530 154 lb 3.2 oz (69.9 kg)   09/23/20 0400 158 lb 14.4 oz (72.1 kg)       Exam   Physical Exam   Constitutional: No distress on O2 per HFNC. Patient appears frail and elderly   Mouth/Throat: Oropharynx is clear and moist.  No oral thrush. Neck: Neck supple. No tracheal deviation present. Cardiovascular: Afib. S1 and S2 with no murmur. No peripheral edema  Pulmonary/Chest: Normal effort with bilateral air entry, Diminished breath sounds throughout . No stridor. No respiratory distress. Patient exhibits no tenderness. No wheezing. Left pigtail to posterior lower chest   Abdominal: Soft. Bowel sounds audible. No distension or tenderness to palp. Musculoskeletal: Moves all extremities; no cyanosis  Neurological: Patient is alert and oriented to person, place, and time. Skin: Warm and dry.  Pale    Labs  - Old records and notes have been reviewed in [facility]   ABG  Lab Results   Component Value Date    PH 7.44 09/21/2020    PO2 60 09/21/2020    PCO2 51 09/21/2020    HCO3 34 09/21/2020    O2SAT 91 09/21/2020     Lab Results   Component Value Date    IFIO2 15 09/18/2020     CBC  Recent Labs     09/22/20  0536 09/24/20  0537   WBC 8.1 10.2   RBC 3.01* 2.92*   HGB 9.4* 9.2*   HCT 30.1* 30.0*   .0* 102.7*   MCH 31.2 31.5   MCHC 31.2* 30.7*    184   MPV 10.8 10.9      BMP  Recent Labs     09/21/20  1527 09/21/20  2120 09/22/20  0810 09/22/20  1711     --  142  --    K 3.7  --  4.2  --      --  104  --    CO2 30  --  30  --    BUN 19  --  21  --    CREATININE 0.9  --  0.8  --    GLUCOSE 167*  -- 88  --    MG  --  1.6  --  1.9   CALCIUM 8.0*  --  8.2*  --      LFT  Recent Labs     09/22/20  0810   AST 15   ALT 31   BILITOT 0.5   ALKPHOS 99     TROP  Lab Results   Component Value Date    TROPONINT 0.042 09/18/2020    TROPONINT 0.037 09/17/2020    TROPONINT 0.041 09/17/2020     BNP  No results for input(s): BNP in the last 72 hours. Lactic Acid  No results for input(s): LACTA in the last 72 hours. INR  No results for input(s): INR, PROTIME in the last 72 hours. PTT  No results for input(s): APTT in the last 72 hours. Glucose  No results for input(s): POCGLU in the last 72 hours. UA   Recent Labs     09/21/20  [address]   . Thoracentesis fluid analysis results. Performed on: 9/17/2020  Side: right side of chest .  By IR: Yes  Amount of pleural fluid drained: 0.92L    Lab Results   Component Value Date    PHFL 7.63 09/21/2020    COLORU LIGHT YELLOW 09/21/2020    COLORU DK YELLOW 09/16/2020    LDFL 86 09/21/2020    PROTEINFL 1.4 09/21/2020     Lab Results   Component Value Date    PROT 4.7 09/22/2020     CYTOLOGY: FINAL RESULTS:   No malignant cells seen. Serum LDH: None  LDH ratio i.e Pleural fluid LDH/ Serum LDH: no serum LDH  Total protein ratio i.e Pleural fluid Total protein/ Serum Total protein: 1.2/5.2=0.23    Pleural fluid cultures were negative so far. Gram stain of pleural fluid is negative for any organisms. Thoracentesis fluid analysis results. Performed on: 9/18/20  Side: left side of chest .  By IR: Yes  Amount of pleural fluid drained: 1.4L    Lab Results   Component Value Date    PHFL 7.63 09/21/2020    COLORU LIGHT YELLOW 09/21/2020    COLORU DK YELLOW 09/16/2020    LDFL 86 09/21/2020    PROTEINFL 1.4 09/21/2020     Lab Results   Component Value Date    PROT 4.7 09/22/2020     CYTOLOGY: no malignant cells    Serum LDH: 199  LDH ratio i.e Pleural fluid LDH/ Serum LDH: 54/199 = 0.271  Total protein ratio i.e Pleural fluid Total protein/ Serum Total protein: 1.4/5.2= 0.26    Pleural fluid cultures were negative so far. Gram stain of pleural fluid is negative for any organisms. Culture, Respiratory [[id number]]   Collected: 09/19/20 1800    Order Status: Completed  Specimen: Esophageal Brush Specimen Quantitative Count  Updated: 09/21/20 0711     Respiratory Culture  Endotracheal tube cultures do not correlate well with chest x-ray results and are a poor predictor of true pulmonary infections. Normal edgar- preliminary     Gram Stain Result  No segmented neutrophils observed. Rare epithelial cells observed. Rare gram negative bacilli. Rare gram positive cocci in pairs. Thoracentesis fluid analysis results. Performed on: 9/21/2020   Side: left side of chest .  By IR: Yes  Amount of pleural fluid drained:  1.4 L of fluid drained    Lab Results   Component Value Date    PHFL 7.63 09/21/2020    COLORU LIGHT YELLOW 09/21/2020    COLORU DK YELLOW 09/16/2020    LDFL 86 09/21/2020    PROTEINFL 1.4 09/21/2020     Lab Results   Component Value Date    PROT 4.4 09/21/2020     CYTOLOGY: Pending    Serum LDH: 216  LDH ratio i.e Pleural fluid LDH/ Serum LDH:  86/216=0.39  Total protein ratio i.e Pleural fluid Total protein/ Serum Total protein: 1.4/4.4=0.31    Pleural fluid cultures were negative so far. Gram stain of pleural fluid is negative for any organisms. Cultures      Pneumonia Panel, Molecular [[id number]]   Collected: 09/19/20 1830    Order Status: Completed  Specimen: BAL- Bronch.  Lavage  Updated: 09/19/20 2232     Source  ET aspirates     Specimen Acceptability  see below     Comment:  Acceptable, >=25 WBCs/LPF        Acinetobacter calcoaceticus-baumannii by PCR  Not Detected     Enterobacter cloacae complex by PCR  Not Detected     Escherichia coli by PCR  Not Detected     Haemophilus Influenzae by PCR  Not Detected     Klebsiella aerogenes by PCR  Not Detected     Klebsiella oxytoca by PCR  Not Detected     Klebsiella pneumoniae by PCR  Not Detected     Moraxella catarrhalis by PCR  Not Detected     Proteus species by PCR  Not Detected     Pseudomonas aeruginosa by PCR  Not Detected     Serratia marcescens by PCR  Not Detected     Staph aureus by PCR  Not Detected     Strep agalactiae by PCR  Not Detected     Strep pneumoniae by PCR  Not Detected     Strep pyogenes by PCR  Not Detected     Resistant gene ctx-m by PCR  NA     Resistant gene imp by PCR  NA     Resistant gene kpc by PCR  NA     Resistant gene meca/c & mrej by PCR  NA     Resistant gene ndm by PCR  NA     Resistant gene oxa-48-like by pcr  NA     Resistant gene vim by PCR  NA     Chlamydia pneumoniae By PCR  Not Detected     Legionella Pneumophilia By PCR  Not Detected     Mycoplasma pneumoniae by PCR  Not Detected     Adenovirus by PCR  Not Detected     CORONAVIRUS PCR  Not Detected     Metapneumovirus by PCR  Not Detected     Rhinovirus Enterovirus PCR  Not Detected     Influenza A by PCR  Not Detected     Influenza B by PCR  Not Detected     Parainfluenza virus by PCR  Not Detected     Respiratory Syncytial Virus by PCR  Not Detected      (-) MRSA  (-) VRE    Culture, Body Fluid [[id number]]   Collected: 09/18/20 1245    Order Status: Completed  Specimen: Body Fluid from Pleural Fluid  Updated: 09/20/20 0835     Body Fluid Culture, Sterile  No growth-preliminary No growth     Gram Stain Result  Few segmented neutrophils observed. No bacteria seen. performed on cytospun specimen      Culture, Urine [[id number]]   Collected: 09/19/20 1730    Order Status: Completed  Specimen: Urine  Updated: 09/20/20 0846     Urine Culture, Routine  No growth-preliminary      SARS-CoV-2, NAAT  NOT DETECTED  NOT DETECTED  Final  09/16/2020 10:00 PM        Echocardiogram       9/22/20  ECHOCARDIOGRAM COMPLETE 2D W DOPPLER W COLOR. Conclusions      Summary   Ejection fraction is visually estimated at 50%. Overall left ventricular function is normal.   Mildly dilated right ventricle.    Right ventricular systolic pressure of 50 mm Hg consistent with moderate   pulmonary hypertension. Mild mitral regurgitation is present. Signature      ----------------------------------------------------------------   Electronically signed by Haile Reyna MD (Interpreting   physician) on 09/22/2020 at 07:52 PM      Radiology    09/24/2020   XR CHEST PORTABLE   IMPRESSION:   Marked reduction of left pleural effusion with improved aeration left lung. Mild increased ill-defined opacification in the right upper lobe most consolidated at the lower distribution. Bibasilar ill-defined opacities favoring layering pleural effusions with atelectasis and/or pneumonia.       09/23/2020   XR CHEST PORTABLE    Left pleural catheter with persistent opacification of the left hemithorax. No pneumothorax. 9/22/20  XR CHEST PORTABLE   1. Almost complete opacification of the left hemithorax, likely secondary to large pleural effusion. 2. Small right-sided pleural effusion. 3. Extensive right lung atelectasis/infiltrate. Assessment   Stage I lung Adenocarcinoma:  radiation oncology following. Current tobacco smoker 1PPD  - Follow with Dr. Efren Canada MD  Acute on chronic hypoxic respiratory failure: 2/2 hx of lung cancer and mucous plugging   -9/19: Bronchoscopy Dr. Ellie Strickland MD- complete opacification of left lung   -9/20: Extubated this AM doing well on HFNC 60% 60LPM  -9/24: maxed out on HFNC- [facility] now  Bilateral Pleural effusions:  Right thoracentesis done 9/17. Left thoracentesis done 9/18- CXR worse 9/19 with left deviation of trachea ? Mucous plugging- Pulmonary consulted with transfer to ICU and Bronchoscopy with Dr. Ellie Strickland MD 9/19  -9/20: cytology pending.  CXR slightly improved - very small bilateral pleural effusions.  -9/21: Left thoracentesis; 1.4L removed  -9/22: Left pigtail placed per IR  -9/23: once again complete opacification of left side even with left pigtial; 600mL out today  -9/24: marked improvement in CXR     Weakness: PT/OT- ECF placement at DC- likely r/t worsening pulmonary status over last several months     Anemia: multifactorial    Palliative Care to see when stable- currently Full Code. 9/23: Hospice consulted then cancelled  9/24: Hospice reconsulted/DNR CC     Hypotension:   -9/20: Levophed gtt DC; BP remains stable  -9/24: Stable     ETOH Abuse: patient admits to drinking daily  9/20: Initiate Phenobarbital alcohol withdrawal protocol     [address]     Plan   -Bronchoscopy cancelled- worried risk will outweigh any benefit- max out on HFNC patient now NOT wanting intubation with possible terminal wean and already discussed that he DID NOT want a tracheostomy. Agree with his decision to remain comfortable with no further heroics. -Oncology consulted- hasn't seen    -Duonebs every 4 hours w/a  -Manav neb BID  -Hospice consulted  -Phenobarbital ETOH withdrawal protocol in place  -Pulmonary will sign off at this point     Case discussed with nurse and patient/family. Questions and concerns addressed. Meds and orders reviewed. Electronically signed by DANIEL Ramirez CNP on 9/24/2020 at 9:16 AM    Addendum by Dr. Yudy Arias MD:  I have seen and examined the patient independently. Face to face evaluation and examination was performed. The above evaluation and note has been reviewed. Labs and radiographs were reviewed. I Have discussed with Ms. JUDE Ramirez CNP about this patient in detail. The above assessment and plan has been reviewed. Please see my modifications mentioned below. My modifications:  He is on Hi flow 60LPM with 100%. Not in any distress. His chest Xray improving from yesterday. [address] -Will sign off on the case from pulmonary point of view. -Will follow PRN. -Call [phone] with questions.     Mike Quiles MD 9/24/2020 10:58 AM

## 2020-09-24 NOTE — PLAN OF CARE
Problem: Falls - Risk of:  Goal: Will remain free from falls  Description: Will remain free from falls  Outcome: Ongoing  Note: Generalize weakness, SOB on exertion, unsteady gait, oxygen therapy, chest tube, hourly rounding, bed side commode, bed alarm in place     Problem: Skin Integrity:  Goal: Will show no infection signs and symptoms  Description: Will show no infection signs and symptoms  Outcome: Ongoing  Note: Multiple skin breakdown, stage 1 buttocks, scattered bruising and abrasion, generalize weakness, continue to monitor for new skin breakdown      Problem: Respiratory  Goal: O2 Sat > 90%  Outcome: Ongoing  Note: Abnormal lung sounds, continuous pulse ox, pulmonary following, pleural chest tube drainage      Problem: GI  Goal: No bowel complications  Outcome: Ongoing  Note: Monitor strict I&Os      Problem: Nutrition  Goal: Optimal nutrition therapy  Outcome: Ongoing  Note: Strict I&Os, offer foods he likes   Care plan reviewed with patient and Hanny Gathers  Patient and Hanny Gathers verbalize understanding of the plan of care and contribute to goal setting.

## 2020-09-24 NOTE — PROGRESS NOTES
Met with the patient in the room. When asked what the doctors had told him, the patient states, Belinda Emanuel told me I am toast.\"  Discussed patient's wishes regarding intubation/heroics and patient indicates that he does not wish to be intubated. Discussed comfort care and this is the patient's wishes. Briefly discussed hospice care and weaning of high flow nasal cannula as symptoms are managed. Discussed patient's Quaker views and patient states that he does not have a Confucianist. Discussed family members to be called and notified and patient states that he only has his ex-wife, Janny Buckley. Patient gives this RN permission to call and speak to Janny Buckley. Patient indicates that he has not spoken to aJnny Buckley since the bronchoscopy has been cancelled and the decision has been changed to hospice/dnr cc. Much emotional support provided. Phone call made to Janny Buckley. Updated Janny Buckley as to the above conversation with the patient and that the patient does not wish to proceed with bronch/intubation based on the information provided to him from the doctors. Janny Buckley is supportive of the patient's decision and is accepting of this. Janny Buckley states that she will be in around 2 PM today. Discussed that, as long as the patient remains stable and not uncomfortable, the patient will be maintained on current oxygen until the meeting with hospice. Janny Buckley is aware that the hospice RN will arrive around 2 pm to speak with her and the patient. Much emotional support provided. Updated Fidel Benedict RN. Updated hospice RN, Isi De La Rosa. Please call palliative care if further needs arise.

## 2020-09-24 NOTE — PROGRESS NOTES
Dr. Adriana Hutton and Camila Jay in room to see patient this morning. Bronchoscopy cancelled per patient's wish. Patient wishes to become DNR CC. Hospice consulted and pallitive care notified and updated.

## 2020-09-24 NOTE — FLOWSHEET NOTE
Advance Directive Consult: Advance Directive education provided and forms were completed. Copies placed in chart and original returned to patient. Physician notified. Copy sent to Medical Records.

## 2020-09-24 NOTE — PLAN OF CARE
Problem: Impaired respiratory status  Goal: Lung sounds clear or within normal limits for patient  Outcome: Met This Shift  Note:  Patient lung sounds are considered normal for their current lung condition. No signs of distress noted. Current treatment regimen appropriate  Patient remains on HFNC 60L 65% will continue to wean as patient tolerates.

## 2020-09-24 NOTE — PROGRESS NOTES
Hospitalist Progress Note    Patient:  Endy Vidal      Unit/Bed:4K-13/013-A    YOB: 1940    MRN: [id number]       Acct: [de-identified]     PCP: Brock Bowens DO    Date of Admission: 9/16/2020    Assessment/Plan:    1. Acute on Chronic Hypoxic Respiratory Failure: 2/2 to right upper lobe lung cancer and mucous plugging. On arrival, patient was placed on 2L NC and saturating > 95%. Patient was taken to the ICU on 09/19 and had bronchoscopy to remove mucous plug. Patient was extubated on 09/20 and placed on HFNC 60% 60 LPM.  1. Titrate O2 to keep SpO2 >90%. Wean O2 as tolerated  2. DuoNebs every 4 hours while awake  3. If condition deteriorates may need reintubation and transferred back to ICU  2. Stage I Lung Adenocarcinoma: Patient was recently diagnosed with lung cancer and was scheduled to initiate radiation therapy. Patient follows with Dr. Kimberley Hsu, radiation oncology. Patient has an extensive smoking history, 2-3 PPD since he was 8years old. Patient is supposed to be on oxygen at home but does not use it given that he continues to smoke. 1. 09/23/2020 --> Patient declined bronchoscopy this morning after learning of the possible admission of intubation with mechanical ventilation post-bronch. Patient agreed to hospice consultation and was started on a general diet. Patient stated that 'I lived a good life' and that he doesn't want to undergo further testing. Later in the day, hospice spoke to patient's ex-wife, POA, who did not agree with placing patient in hospice and with canceling bronchoscopy. Ex-wife convinced patient who voiced agreeable to bronchoscopy knowing the great possibility of ICU admission and low chance of extubation. Per pulmonology, bronchoscopy rescheduled for tomorrow.   2. 09/24/2020 --> Patient's O2 requirement have increased to 100% HFNC 60 LPM. After long discussion with patient regarding his prognosis and high possibility of intubation and ICU admission post-bronch. Patient stated that he does NOT want to be intubated or be placed on mechanical ventilation with the possibility of no extubation. Patient stated that he wants to remain comfortable his remaining days and wants no further heroics. Bronchoscopy was cancelled. Hospice was reconsulted. 3. Bilateral Pleural Effusions highly likely this is malignant: CT chest on 09/16/2020 showed bilateral pleural effusion. Left upper and lower lobe consolidations. Nodular densities right lung. Cardiomegaly with small pericardial effusion. Pulmonology was consulted. Per their recommendation: right thoracentesis done on 09/17/2020. Left thoracentesis done on 09/18/2020 with 1.4L removed; consistent with transudate effusion. 1. 09/22 --> per pulmonology: Pigtail catheter placement by IR service. 2. 09/23 - 600 mL out through the pigtail catheter today  4. Deconditioning/malnutrition: Patient lives alone and has not been doing well. Ex-wife lives 45 minutes away and expressed concern about his care. She is unable to provide care for him. 1. PT/OT consulted  2. Dietitian consulted      Disposition:    [] Home       [] TCU       [] Rehab       [] Psych       [] SNF       [x] [facility]       [] Other-    Chief Complaint: Weakness    Hospital Course: This is a 61-year-old male with past medical history of HTN, CAD, HLD, COPD, PAF, brought in by his ex-wife due to ongoing generalized fatigue for the past 2 to 3 weeks. Patient's ex-wife found patient unable to get up from the couch which prompted the ED visit. Patient was recently diagnosed with lung cancer stage I and was scheduled to initiate radiation therapy. Patient follows with Dr. Joseph Toussaint, radiation oncology. He has an extensive smoking history, 2-3 ppd since he was 8years old. Per patient and his ex-wife, he has been feeling increasingly fatigued, limiting his ability to move around.   He reports reportedly had a fall out of his couch the morning prior to admission, he could not get up on his own so had to call 911 who helped him get back up. He has not been able to eat or drink much in the past 2 to 3 weeks due to weakness. He reports having dyspnea at baseline which is recently progressively worsening. In the ED, patient presented afebrile and hemodynamically stable, BP of 144/77. Placed on 2L NC, saturating > 95%. CXR showed congestion with complete opacification of the LLL likely due to combination of consolidation and pleural fluid. CT chest showed bilateral pleural effusions, left upper and lower lobe consolidations, nodular densities in the right lung, cardiomegaly with small pericardial effusion. CT abdomen and pelvis revealed small amount of ascites in the pelvis, hepatomegaly, cholelithiasis, and bilateral nonobstructive nephrolithiasis. COVID negative. Subjective (past 24 hours):   No acute overnight events. Temp of 100.2. Denies any chest pain, shortness of breath, palpitations, nausea, vomiting. No complaints at this time. Medications:  Reviewed    Infusion Medications   Scheduled Medications    Arformoterol Tartrate  15 mcg Nebulization BID    [Held by provider] enoxaparin  40 mg Subcutaneous Daily    magnesium replacement protocol   Other RX Placeholder    ipratropium-albuterol  1 ampule Inhalation Q4H WA    amLODIPine  10 mg Per NG tube Daily    carvedilol  6.25 mg Per NG tube BID WC    folic acid  1 mg Per NG tube Daily    methocarbamol  1,000 mg Per NG tube TID    spironolactone  25 mg Per NG tube Daily    sodium chloride flush  10 mL Intravenous 2 times per day    lidocaine  1 patch Transdermal Daily    [Held by provider] losartan  100 mg Oral Daily    [Held by provider] pravastatin  20 mg Oral Daily     PRN Meds:  PHENobarbital **OR** PHENobarbital **OR** PHENobarbital IVPB, albuterol, sodium chloride flush, acetaminophen **OR** acetaminophen, polyethylene glycol, promethazine **OR** ondansetron      Intake/Output Summary (Last 24 hours) at 9/24/2020 0827  Last data filed at 9/24/2020 0334  Gross per 24 hour   Intake 1210 ml   Output 1400 ml   Net -190 ml       Diet:  Diet NPO, After Midnight Exceptions are: Sips with Meds    Exam:  /79   Pulse 72   Temp 99.4 °F (37.4 °C) (Oral)   Resp 20   Ht 6' (1.829 m)   Wt 154 lb 3.2 oz (69.9 kg)   SpO2 92%   BMI 20.91 kg/m²     General appearance: No apparent distress, appears stated age and cooperative. On O2 per HFNC. HEENT: Pupils equal, round, and reactive to light. Conjunctivae/corneas clear. Neck: Supple, with full range of motion. No jugular venous distention. Trachea midline. Respiratory:  Normal respiratory effort. Diminished breath sounds throughout with absent left breath sounds. Cardiovascular:  Normal S1/S2 without murmurs, rubs or gallops. Abdomen: Soft, non-tender, non-distended with normal bowel sounds. Musculoskeletal: passive and active ROM x 4 extremities. Skin: Skin color, texture, turgor normal.  No rashes or lesions. Neurologic:  Neurovascularly intact without any focal sensory/motor deficits. Psychiatric: Alert and oriented, thought content appropriate, normal insight  Capillary Refill: Brisk,< 3 seconds   Peripheral Pulses: +2 palpable, equal bilaterally       Labs:   Recent Labs     09/22/20  0536 09/24/20  0537   WBC 8.1 10.2   HGB 9.4* 9.2*   HCT 30.1* 30.0*    184     Recent Labs     09/21/20  1527 09/22/20  0810    142   K 3.7 4.2    104   CO2 30 30   BUN 19 21   CREATININE 0.9 0.8   CALCIUM 8.0* 8.2*     Recent Labs     09/22/20  0810   AST 15   ALT 31   BILITOT 0.5   ALKPHOS 99     No results for input(s): INR in the last 72 hours. No results for input(s): Philip Clap in the last 72 hours.     Microbiology:      Urinalysis:      Lab Results   Component Value Date    NITRU NEGATIVE 09/16/2020    WBCUA 2-4 09/16/2020    BACTERIA NONE SEEN 09/16/2020    RBCUA 3-5 09/16/2020 BLOODU NEGATIVE 09/16/2020    GLUCOSEU NEGATIVE 09/16/2020       Radiology:  XR CHEST PORTABLE   Final Result   Exam Date and Exam Time: 09/24/2020 03:35 AM      Accession: [id number]      Reason for exam: left pleural effusion w/left pigtail      Ordering Diagnosis: Pleural effusion      Impression: 9/23/20. Findings:      Marked improved aeration left lung with patchy ill-defined opacities in    the left mid and lower lung zones. Considering the slight differences in    technique and positioning the overall appearance of the right upper lobe    ill-defined consolidation has mildly increased particularly in the lower    distribution. Ill-defined opacity in the right lower lung zone is    relatively unchanged. Cardiomegaly. Left thoracostomy tube terminates    within the left infrahilar region. IMPRESSION:      Marked reduction of left pleural effusion with improved aeration left    lung. Mild increased ill-defined opacification in the right upper lobe most    consolidated at the lower distribution. Bibasilar ill-defined opacities favoring layering pleural effusions with    atelectasis and/or pneumonia. This document has been electronically signed by: Mary Banks. Windy Steele on    09/24/2020 07:17 AM         XR CHEST PORTABLE   Final Result   Left pleural catheter with persistent opacification of the left    hemithorax. No pneumothorax. This document has been electronically signed by: Stephen Monae MD on    09/23/2020 02:58 AM         XR CHEST PORTABLE   Final Result   It is unclear whether the small left apical pneumothorax persists, though it certainly has not enlarged since previous. **This report has been created using voice recognition software. It may contain minor errors which are inherent in voice recognition technology. **      Final report electronically signed by Dr. Tj Maciel on 9/22/2020 3:47 PM      XR CHEST PORTABLE   Final Result   A small left pneumothorax is seen. A short-term follow-up chest x-ray will be performed. **This report has been created using voice recognition software. It may contain minor errors which are inherent in voice recognition technology. **      Final report electronically signed by Dr. Chey Hull on 9/22/2020 11:02 AM      US THORACENTESIS WITH TUBE   Final Result      Successful ultrasound-guided thoracentesis with  0.55 L of fluid drained. **This report has been created using voice recognition software. It may contain minor errors which are inherent in voice recognition technology. **      Final report electronically signed by Dr. Chey Hull on 9/22/2020 11:25 AM      XR CHEST PORTABLE   Final Result   1. Almost complete opacification of the left hemithorax, likely secondary to large pleural effusion. 2. Small right-sided pleural effusion. 3. Extensive right lung atelectasis/infiltrate. **This report has been created using voice recognition software. It may contain minor errors which are inherent in voice recognition technology. **      Final report electronically signed by Dr. Abram Casper on 9/22/2020 8:26 AM      VL DUP LOWER EXTREMITY VENOUS BILATERAL   Final Result      1. No sonographic evidence of lower extremity DVT. **This report has been created using voice recognition software. It may contain minor errors which are inherent in voice recognition technology. **      Final report electronically signed by Dr. Arnaldo Holman on 9/21/2020 2:08 PM      XR CHEST PA INSPIRATION 1 VW   Final Result   No pneumothorax post left thoracentesis. **This report has been created using voice recognition software. It may contain minor errors which are inherent in voice recognition technology. **      Final report electronically signed by Dr. Chey Hull on 9/21/2020 1:50 PM      US THORACENTESIS   Final Result      Successful ultrasound-guided thoracentesis with  1.4 L of fluid drained. **This report has been created using voice recognition software. It may contain minor errors which are inherent in voice recognition technology. **      Final report electronically signed by Dr. Yfn Contreras on 9/21/2020 12:33 PM      XR CHEST PORTABLE   Final Result   There is complete opacification of the left hemithorax which may be due to a large pleural effusion. Appearance of the chest is worse when compared to the previous exam.               **This report has been created using voice recognition software. It may contain minor errors which are inherent in voice recognition technology. **      Final report electronically signed by Dr Lisa Carranza on 9/21/2020 6:20 AM      XR CHEST PORTABLE   Final Result   Cardiomegaly and pulmonary vascular congestion with small bilateral pleural effusions. The appearance of the chest has slightly improved when compared to the previous study. **This report has been created using voice recognition software. It may contain minor errors which are inherent in voice recognition technology. **      Final report electronically signed by Dr Lisa Carranza on 9/20/2020 2:28 AM      XR ABDOMEN FOR NG/OG/NE TUBE PLACEMENT   Final Result   1. There is an enteric tube present coursing below the diaphragm, the distal portions of which are excluded from the current examination. However the distal side-port projects over the gastric body. **This report has been created using voice recognition software. It may contain minor errors which are inherent in voice recognition technology. **      Final report electronically signed by Dr. Randa Vasquez on 9/19/2020 6:56 PM      XR CHEST PORTABLE   Final Result   1. Significantly improved aeration of the left lung. However there is partial exclusion of the left lateral thorax and examination.    2. Interval placement of endotracheal tube with tip overlying the trachea approximately 7.1 cm above the malaika. There are 3. Diffuse bilateral interstitial opacities are present overlying the visualized lung. This likely represents underlying pulmonary    edema. 4. Mild hazy opacity at the right lung base may indicate small underlying right-sided pleural fluid. **This report has been created using voice recognition software. It may contain minor errors which are inherent in voice recognition technology. **      Final report electronically signed by Dr. Abhinav Cheung on 9/19/2020 6:55 PM      XR CHEST PORTABLE   Final Result   Worsening appearance of the chest with now complete opacification of the left lung   Mucous plugging be a consideration. **This report has been created using voice recognition software. It may contain minor errors which are inherent in voice recognition technology. **      Final report electronically signed by Dr. Aki Krishnamurthy on 9/19/2020 3:14 AM      XR CHEST PORTABLE   Final Result   1. Mild to moderate cardiomegaly. Moderate-sized pleural effusion left side, slightly improved from earlier. Tiny effusion right side. No pneumothorax seen, however. 2. Moderate pneumonia/pulmonary edema scattered diffusely in the right lung and likely diffusely in the left lung is well. 3. Overall appearance of chest not significantly changed from earlier with the exception of slightly small pleural effusion left side. **This report has been created using voice recognition software. It may contain minor errors which are inherent in voice recognition technology. **      Final report electronically signed by Dr. Noa Parks on 9/18/2020 2:52 PM      XR CHEST 1 VIEW   Final Result   No pneumothorax post left thoracentesis. **This report has been created using voice recognition software. It may contain minor errors which are inherent in voice recognition technology. **      Final report electronically signed by Dr. Esme Waldron on 9/18/2020 1:51 PM      US THORACENTESIS   Final Result      Successful ultrasound-guided thoracentesis with  1.4 L of fluid drained. Patient was specifically informed that his large left pleural effusion would likely have to be drained in two procedures. **This report has been created using voice recognition software. It may contain minor errors which are inherent in voice recognition technology. **      Final report electronically signed by Dr. Claus Ayala on 9/18/2020 3:50 PM      US THORACENTESIS   Final Result      Successful ultrasound-guided thoracentesis with  0.92 L of fluid drained. **This report has been created using voice recognition software. It may contain minor errors which are inherent in voice recognition technology. **      Final report electronically signed by Dr. Claus Ayala on 9/17/2020 4:32 PM      XR CHEST PA INSPIRATION 1 VW   Final Result   No pneumothorax post right thoracentesis. Note should be made that internal medicine was contacted and stated they definitely warranted a right thoracentesis to proceed a left thoracentesis. **This report has been created using voice recognition software. It may contain minor errors which are inherent in voice recognition technology. **      Final report electronically signed by Dr. Claus Ayala on 9/17/2020 4:16 PM      CT CHEST WO CONTRAST   Final Result   Bilateral pleural effusions. Left upper and lower lobe consolidations. Nodular densities right lung. Cardiomegaly with small pericardial effusion. **This report has been created using voice recognition software. It may contain minor errors which are inherent in voice recognition technology. **      Final report electronically signed by Dr. William Rios on 9/16/2020 11:13 PM      CT ABDOMEN PELVIS WO CONTRAST Additional Contrast? None   Final Result   1. Small amount of ascites in the pelvis   2. Hepatomegaly   3. Cholelithiasis   4.  Bilateral nonobstructive nephrolithiasis. **This report has been created using voice recognition software. It may contain minor errors which are inherent in voice recognition technology. **      Final report electronically signed by Dr. Hanny Gutierres on 9/16/2020 11:16 PM      XR CHEST PORTABLE   Final Result   Congestive failure with complete opacification of the left lower lobe likely due to combination of consolidation and pleural fluid. Probable posteriorly layered right effusion versus developing infiltrate            **This report has been created using voice recognition software. It may contain minor errors which are inherent in voice recognition technology. **      Final report electronically signed by Dr. Hanny Gutierres on 9/16/2020 10:33 PM          DVT prophylaxis: [] Lovenox                                 [x] SCDs                                 [] SQ Heparin                                 [] Encourage ambulation           [] Already on Anticoagulation     Code Status: Limited    PT/OT Eval Status: Yes    Tele:   [x] yes             [] no        Electronically signed by Ventura Hawkins MD on 9/24/2020 at 8:27 AM

## 2020-09-24 NOTE — PLAN OF CARE
Problem: Falls - Risk of:  Goal: Will remain free from falls  Description: Will remain free from falls  9/24/2020 0042 by Andre Gaxiola RN  Outcome: Ongoing  Note: Call light, overbed table, and belongings within reach. Bed alarm activated. Up with one assist and walker to [facility] otherwise patient bedrest due to oxygen needs. Patient included in hourly rounds. Problem: Skin Integrity:  Goal: Will show no infection signs and symptoms  Description: Will show no infection signs and symptoms  9/24/2020 0042 by Andre Gaxiola RN  Outcome: Ongoing  Note: No new skin breakdown noted upon skin assessment. Turning and repositioning patient with pillow support every two hours. Problem: Respiratory  Goal: O2 Sat > 90%  9/24/2020 0042 by Andre Gaxiola RN  Outcome: Ongoing  Note: Patient remains on high flow O2 with some desats into 87-88%. Problem: GI  Goal: No bowel complications  [id number]/45/7778 0042 by Andre Gaxiola RN  Note: Patients bowel sounds are active. No bowel complications noted. Problem:   Goal: Adequate urinary output  Outcome: Ongoing  Note: Briones remains in place for strict outputs. Urinating adequately. Urine yellow and hazy. Problem: Nutrition  Goal: Optimal nutrition therapy  9/24/2020 0042 by Andre Gaxiola RN  Outcome: Ongoing  Note: Patient NPO at midnight due to bronch that is scheduled tomorrow 9/24. Problem: Musculor/Skeletal Functional Status  Goal: Highest potential functional level  Outcome: Ongoing  Note: Patients extremities all active. Weakness and unsteady in bilateral lower extremities. Care plan reviewed with patient. Patient verbalizes understanding of the plan of care and contribute to goal setting.

## 2020-09-24 NOTE — PROGRESS NOTES
[address] THERAPY MISSED TREATMENT NOTE  [facility] ICU STEPDOWN TELEMETRY 4K  4K-13/013-A      Date: 2020  Patient Name: Hai Toussaint        CSN: [id number]   : 1940  (68 y.o.)  Gender: male   Referring Practitioner: Dr. Emilee Holloway  Diagnosis: B pleural effusion         REASON FOR MISSED TREATMENT: Pt on hold today, increased O2 needs unable to tolerate therapy at this time. Decisions being made on POC.

## 2020-09-24 NOTE — FLOWSHEET NOTE
[place] [address] PROGRESS NOTE      Patient: Hai Toussaint  Room #: 6E-46/769-B            YOB: 1940  Age: 68 y.o. Gender: male            Admit Date & Time: 9/16/2020  8:51 PM    Assessment:  Cynthia Spence is a 68year old male who is in bed on 4k. A consult from his nurse for spiritual care was being followed. Cynthia Spence is familiar with this  as I was with him yesterday. He denies any spiritual care needs or emotional needs at this time. Patient stated, \"I just want to be left alone. \"  This patient will allow Cynthia Spence to have his privacy    Interventions: Words of encouragement given. Active listening is available as needed. This  stated, \"I will keep you in my thoughts and prayer,\"      Outcomes:  Cynthia Spence knows we are available to him as needed. He is now on hospice. This  had a conversation with [facility] nurse. Plan:    1. Spiritual care is available as needed.      Electronically signed by Allyson Hay on 9/24/2020 at 12:11 PM.  [address] [facility]  [phone]

## 2020-09-25 PROCEDURE — 2500000003 HC RX 250 WO HCPCS: Performed by: INTERNAL MEDICINE

## 2020-09-25 PROCEDURE — 2700000000 HC OXYGEN THERAPY PER DAY

## 2020-09-25 PROCEDURE — 94761 N-INVAS EAR/PLS OXIMETRY MLT: CPT

## 2020-09-25 PROCEDURE — 6360000002 HC RX W HCPCS: Performed by: INTERNAL MEDICINE

## 2020-09-25 PROCEDURE — 1200000003 HC TELEMETRY R&B

## 2020-09-25 PROCEDURE — 6370000000 HC RX 637 (ALT 250 FOR IP): Performed by: STUDENT IN AN ORGANIZED HEALTH CARE EDUCATION/TRAINING PROGRAM

## 2020-09-25 PROCEDURE — 99232 SBSQ HOSP IP/OBS MODERATE 35: CPT | Performed by: INTERNAL MEDICINE

## 2020-09-25 RX ADMIN — ACETAMINOPHEN 650 MG: 325 TABLET ORAL at 09:22

## 2020-09-25 RX ADMIN — LORAZEPAM 1 MG: 2 INJECTION INTRAMUSCULAR; INTRAVENOUS at 11:33

## 2020-09-25 RX ADMIN — GLYCOPYRROLATE 0.2 MG: 0.2 INJECTION, SOLUTION INTRAMUSCULAR; INTRAVENOUS at 14:33

## 2020-09-25 RX ADMIN — LORAZEPAM 1 MG: 2 INJECTION INTRAMUSCULAR; INTRAVENOUS at 14:55

## 2020-09-25 RX ADMIN — LORAZEPAM 1 MG: 2 INJECTION INTRAMUSCULAR; INTRAVENOUS at 04:58

## 2020-09-25 ASSESSMENT — PAIN SCALES - GENERAL: PAINLEVEL_OUTOF10: 0

## 2020-09-25 NOTE — PLAN OF CARE
Problem: Nutrition  Goal: Optimal nutrition therapy  9/25/2020 1058 by Kiersten Alexis RD, LD  Outcome: Ongoing  Nutrition Problem #1: Moderate malnutrition  Intervention: Food and/or Nutrient Delivery: Continue Current Diet, Continue Oral Nutrition Supplement  Nutritional Goals: Pt. will tolerate and consume 75% or more of meals to promote wound healing during LOS.

## 2020-09-25 NOTE — PROGRESS NOTES
Call with Mariposa Francis and updated her that patient more than likely going to pass in next few hours or through night. Voiced understanding. Support provided. She may return to hospital but is unsure is she will.

## 2020-09-25 NOTE — FLOWSHEET NOTE
09/24/20 2221   Provider Notification   Reason for Communication Evaluate   Provider Name Mounika Medina   Provider Notification Advance Practice Clinician (CNS, NP, CNM, CRNA, PA)   Method of Communication Secure Message   Response No new orders   Notification Time [id number]- RN sent message to Mounika Medina [place] regarding 8X13 due to when RN came on shift the ham close drain was puffed up with air and the compression device fills with air within seconds after being compressed. RN attempted to change the bag and it still happened with new bag. resource RNs took a look at it as well and suggested letting provider know. No new orders due to patient being hospice.

## 2020-09-25 NOTE — PROGRESS NOTES
Patient lying in bed with noted respirations to be at 20 min, gray in color. No noted need for comfort medication at this time. Pulse ox had been 76% when last checked. Family just stepped out of room. Had ativan 1mg IV at 1455, 1133, and 0458 with no use of morphine as of yet. Primary nurse En RN denied needs at this time. Patient not meeting for [facility] hospice as he has had minimal use of comfort medication. Hospice will continue to support and evaluate for proper level of care per medicare guidelines.

## 2020-09-25 NOTE — PROGRESS NOTES
Comprehensive Nutrition Assessment    Type and Reason for Visit:  Reassess    Nutrition Recommendations/Plan: Will continue Ensure Enlive TID. Encouraged po, ONS as tolerated. Nutrition Assessment:    Pt improving from a nutritional standpoint AEB acceptance of ONS. Remains at risk for further nutritional compromise r/t moderate malnutrition, increased nutrient needs for wound healing and underlying medical condition (hx lung cancer, COPD). Nutrition recommendations/interventions as per above. Malnutrition Assessment:  Malnutrition Status: Moderate malnutrition    Context:  Acute Illness     Findings of the 6 clinical characteristics of malnutrition:  Energy Intake:  Mild decrease in energy intake (Comment)(skips atleast 1 meal/day per diet hx)  Weight Loss:  (16# wt loss in 1 year per pt,)     Body Fat Loss:  1 - Mild body fat loss Orbital   Muscle Mass Loss:  1 - Mild muscle mass loss Temples (temporalis)  Fluid Accumulation:  Unable to assess     Strength:  Not Performed    Estimated Daily Nutrient Needs:  Energy (kcal):  ~2250 kcals (30/kgm); Weight Used for Energy Requirements:  ((9/18) 75kgm)     Protein (g):  90 grams (1.2 grams protein/kgm);  Weight Used for Protein Requirements:  (75kgm (9/18))            Nutrition Related Findings:  Pt. seen - reports appetite is \"minimal\"; states acceptance of Ensure ONS; plan [facility], comfort care      Wounds:  Stage I(posterior buttocks)       Current Nutrition Therapies:    DIET GENERAL;  Dietary Nutrition Supplements: Standard High Calorie Oral Supplement    Anthropometric Measures:  · Height: 6' (182.9 cm)  · Current Body Weight: 154 lb 3.2 oz (69.9 kg)(9/24, trace edema)   · Admission Body Weight: 164 lb 14.5 oz (74.8 kg)((9/17) + 1 RLE & LLE edema)    · Usual Body Weight: (162# 3.2oz (8/3/20) per EMR)     · Ideal Body Weight: 178 lbs;     · BMI: 20.9  · BMI Categories: Underweight (BMI less than 22) age over 72       Nutrition Diagnosis:   · Moderate malnutrition related to catabolic illness as evidenced by mild loss of subcutaneous fat, mild muscle loss(diet hx skips atleast 1 meal/day)      Nutrition Interventions:   Food and/or Nutrient Delivery:  Continue Current Diet, Continue Oral Nutrition Supplement  Nutrition Education/Counseling:  Education initiated(9/21 Encouraged po, ONS intake at best efforts.)   Coordination of Nutrition Care:  Continued Inpatient Monitoring    Goals:  Pt. will tolerate and consume 75% or more of meals to promote wound healing during LOS. Nutrition Monitoring and Evaluation:   Food/Nutrient Intake Outcomes:  Diet Advancement/Tolerance, Food and Nutrient Intake, Supplement Intake  Physical Signs/Symptoms Outcomes:  Biochemical Data, Chewing or Swallowing, GI Status, Fluid Status or Edema, Nutrition Focused Physical Findings, Skin, Weight     Discharge Planning:     Too soon to determine     Electronically signed by Chaya Duncan RD, LAITH on 9/25/20 at 10:59 AM EDT    Contact: [phone]

## 2020-09-25 NOTE — CARE COORDINATION
20, 12:20 PM EDT    DISCHARGE PLANNING EVALUATION      SHERYL spoke to patient's ex-wife. She had questions/concerns as no one is the patient's POA nor does he have children, and now how does she mnaage his  needs. SHERYL advised her to contact an  as the [facility]. SHERYL also gave her the number to [phone]. SHERYL spoke to Adrienne Vargas there who states between their office and the [place] Commissioner's office they will assist with a burial but will not be a cremation as he wanted. But if he is over resource, they will not help. SH[facility]L [facility], he may need to be in a  home morgue. SHERYL offered support and advised to seek  of the  yet today.

## 2020-09-25 NOTE — PROGRESS NOTES
Visit made to assess patient symptoms and provide support. Patient transitioned from high flow to NC 6L per plan of care discussed with Joe Aburto and his ex-wife Carlos/ KEVIN on 2020. Ativan 1mg being adminsitered during visit for restlessness, prior to this had dose of ativan 1mg IV at 0458 otherwise no other comfort medication usage thus far. Patient symptoms well managed at this time. Patient does NOT MEET for inpatient criteria at this time. Pulse ox 74% this morning. After ativan no s/s of distress or discomfort noted. Met with Logan Ramos at beside with support given. Logan Ramos voiced \"this is harder than I thought it would be\". Marsha Belt in to assist with questions about patient finances and who will take care of estate/ . Much support provided to Logan Ramos and told her that hospice will continue to support and if would meet criteria would transition to hospice. Did discuss with her patient actively dying and more than likely will pass later in day or in next 24hrs. Emotional. Other family members on way to make visit. Denied needs at this time.

## 2020-09-25 NOTE — PROGRESS NOTES
[address] [facility] [address] THERAPY MISSED TREATMENT NOTE  [facility] ICU STEPDOWN TELEMETRY 4K  4K-13/013-A      Date: 2020  Patient Name: Karol Henry        CSN: [id number]   : 1940  (68 y.o.)  Gender: male   Referring Practitioner: Dr. Ivelisse Lui  Diagnosis: B pleural effusion         REASON FOR MISSED TREATMENT: Pt has decided on hospice care, will discharge from OT services.

## 2020-09-25 NOTE — CARE COORDINATION
Update: Hospice following, DNR Comfort Care status, Oxygen 6L; continue to monitor; collaborated with Unknown Kelechi, Lori Nieves  Electronically signed by Aundrea Ron RN on 9/25/2020 at 11:06 AM

## 2020-09-25 NOTE — PROGRESS NOTES
Hospitalist Progress Note    Patient:  James Core      Unit/Bed:4K-13/013-A    YOB: 1940    MRN: [id number]       Acct: [de-identified]     PCP: Dana Carrion DO    Date of Admission: 9/16/2020    Assessment/Plan:    1. Acute on Chronic Hypoxic Respiratory Failure: 2/2 to right upper lobe lung cancer and mucous plugging. On arrival, patient was placed on 2L NC and saturating > 95%. Patient was taken to the ICU on 09/19 and had bronchoscopy to remove mucous plug. Patient was extubated on 09/20 and placed on HFNC 60% 60 LPM.  1. 09/25/2020 --> Code Status changed to [facility] as per patient wishes. Comfort care meds added - ativan, morphine, robinul prn. Now on 6L of O2 via NC with SPO2 79.     2. Stage I Lung Adenocarcinoma: Patient was recently diagnosed with lung cancer and was scheduled to initiate radiation therapy. Patient follows with Dr. Madai Clemens, radiation oncology. Patient has an extensive smoking history, 2-3 PPD since he was 8years old. Patient is supposed to be on oxygen at home but does not use it given that he continues to smoke. 1. 09/23/2020 --> Patient declined bronchoscopy this morning after learning of the possible admission of intubation with mechanical ventilation post-bronch. Patient agreed to hospice consultation and was started on a general diet. Patient stated that 'I lived a good life' and that he doesn't want to undergo further testing. Later in the day, hospice spoke to patient's ex-wife, POA, who did not agree with placing patient in hospice and with canceling bronchoscopy. Ex-wife convinced patient who voiced agreeable to bronchoscopy knowing the great possibility of ICU admission and low chance of extubation. Per pulmonology, bronchoscopy rescheduled for tomorrow.   2. 09/24/2020 --> Patient's O2 requirement have increased to 100% HFNC 60 LPM. After long discussion with patient regarding his prognosis and high possibility of intubation and ICU admission been feeling increasingly fatigued, limiting his ability to move around. He reports reportedly had a fall out of his couch the morning prior to admission, he could not get up on his own so had to call 911 who helped him get back up. He has not been able to eat or drink much in the past 2 to 3 weeks due to weakness. He reports having dyspnea at baseline which is recently progressively worsening. In the ED, patient presented afebrile and hemodynamically stable, BP of 144/77. Placed on 2L NC, saturating > 95%. CXR showed congestion with complete opacification of the LLL likely due to combination of consolidation and pleural fluid. CT chest showed bilateral pleural effusions, left upper and lower lobe consolidations, nodular densities in the right lung, cardiomegaly with small pericardial effusion. CT abdomen and pelvis revealed small amount of ascites in the pelvis, hepatomegaly, cholelithiasis, and bilateral nonobstructive nephrolithiasis. COVID negative. Subjective (past 24 hours):   Patient was febrile and received a dose of Tylenol 650 mg. Weaned off HFNC--now on 6L of O2 via NC with SPO2 79. No acute complaints. States he is comfortable.     Medications:  Reviewed    Infusion Medications   Scheduled Medications    Arformoterol Tartrate  15 mcg Nebulization BID    ipratropium-albuterol  1 ampule Inhalation Q4H WA    lidocaine  1 patch Transdermal Daily     PRN Meds: morphine, LORazepam, glycopyrrolate, albuterol, acetaminophen **OR** acetaminophen, polyethylene glycol, promethazine **OR** ondansetron      Intake/Output Summary (Last 24 hours) at 9/25/2020 0950  Last data filed at 9/24/2020 2151  Gross per 24 hour   Intake 900 ml   Output 950 ml   Net -50 ml       Diet:  DIET GENERAL;    Exam:  BP (!) 144/65   Pulse 95   Temp 100.8 °F (38.2 °C) (Oral)   Resp 20   Ht 6' (1.829 m)   Wt 154 lb 3.2 oz (69.9 kg)   SpO2 (!) 74%   BMI 20.91 kg/m²     General appearance: No apparent distress, appearance of the right upper lobe    ill-defined consolidation has mildly increased particularly in the lower    distribution. Ill-defined opacity in the right lower lung zone is    relatively unchanged. Cardiomegaly. Left thoracostomy tube terminates    within the left infrahilar region. IMPRESSION:      Marked reduction of left pleural effusion with improved aeration left    lung. Mild increased ill-defined opacification in the right upper lobe most    consolidated at the lower distribution. Bibasilar ill-defined opacities favoring layering pleural effusions with    atelectasis and/or pneumonia. This document has been electronically signed by: Rhonda Jimenez. Eduardo Quam on    09/24/2020 07:17 AM         XR CHEST PORTABLE   Final Result   Left pleural catheter with persistent opacification of the left    hemithorax. No pneumothorax. This document has been electronically signed by: Markos Palacios MD on    09/23/2020 02:58 AM         XR CHEST PORTABLE   Final Result   It is unclear whether the small left apical pneumothorax persists, though it certainly has not enlarged since previous. **This report has been created using voice recognition software. It may contain minor errors which are inherent in voice recognition technology. **      Final report electronically signed by Dr. Nadir Gan on 9/22/2020 3:47 PM      XR CHEST PORTABLE   Final Result   A small left pneumothorax is seen. A short-term follow-up chest x-ray will be performed. **This report has been created using voice recognition software. It may contain minor errors which are inherent in voice recognition technology. **      Final report electronically signed by Dr. Nadir Gan on 9/22/2020 11:02 AM      US THORACENTESIS WITH TUBE   Final Result      Successful ultrasound-guided thoracentesis with  0.55 L of fluid drained. **This report has been created using voice recognition software.  It may contain minor errors which are inherent in voice recognition technology. **      Final report electronically signed by Dr. Esme Waldron on 9/22/2020 11:25 AM      XR CHEST PORTABLE   Final Result   1. Almost complete opacification of the left hemithorax, likely secondary to large pleural effusion. 2. Small right-sided pleural effusion. 3. Extensive right lung atelectasis/infiltrate. **This report has been created using voice recognition software. It may contain minor errors which are inherent in voice recognition technology. **      Final report electronically signed by Dr. Pablito Michele on 9/22/2020 8:26 AM      VL DUP LOWER EXTREMITY VENOUS BILATERAL   Final Result      1. No sonographic evidence of lower extremity DVT. **This report has been created using voice recognition software. It may contain minor errors which are inherent in voice recognition technology. **      Final report electronically signed by Dr. Abhinav Cheung on 9/21/2020 2:08 PM      XR CHEST PA INSPIRATION 1 VW   Final Result   No pneumothorax post left thoracentesis. **This report has been created using voice recognition software. It may contain minor errors which are inherent in voice recognition technology. **      Final report electronically signed by Dr. Esme Waldron on 9/21/2020 1:50 PM      US THORACENTESIS   Final Result      Successful ultrasound-guided thoracentesis with  1.4 L of fluid drained. **This report has been created using voice recognition software. It may contain minor errors which are inherent in voice recognition technology. **      Final report electronically signed by Dr. Esme Waldron on 9/21/2020 12:33 PM      XR CHEST PORTABLE   Final Result   There is complete opacification of the left hemithorax which may be due to a large pleural effusion.  Appearance of the chest is worse when compared to the previous exam.               **This report has been created using voice recognition software. It may contain minor errors which are inherent in voice recognition technology. **      Final report electronically signed by Dr Claudene Patch on 9/21/2020 6:20 AM      XR CHEST PORTABLE   Final Result   Cardiomegaly and pulmonary vascular congestion with small bilateral pleural effusions. The appearance of the chest has slightly improved when compared to the previous study. **This report has been created using voice recognition software. It may contain minor errors which are inherent in voice recognition technology. **      Final report electronically signed by Dr Claudene Patch on 9/20/2020 2:28 AM      XR ABDOMEN FOR NG/OG/NE TUBE PLACEMENT   Final Result   1. There is an enteric tube present coursing below the diaphragm, the distal portions of which are excluded from the current examination. However the distal side-port projects over the gastric body. **This report has been created using voice recognition software. It may contain minor errors which are inherent in voice recognition technology. **      Final report electronically signed by Dr. Brian Jaimes on 9/19/2020 6:56 PM      XR CHEST PORTABLE   Final Result   1. Significantly improved aeration of the left lung. However there is partial exclusion of the left lateral thorax and examination. 2. Interval placement of endotracheal tube with tip overlying the trachea approximately 7.1 cm above the [place]. There are 3. Diffuse bilateral interstitial opacities are present overlying the visualized lung. This likely represents underlying pulmonary    edema. 4. Mild hazy opacity at the right lung base may indicate small underlying right-sided pleural fluid. **This report has been created using voice recognition software. It may contain minor errors which are inherent in voice recognition technology. **      Final report electronically signed by Dr. Brian Jaimes on 9/19/2020 6:55 PM      XR CHEST Successful ultrasound-guided thoracentesis with  0.92 L of fluid drained. **This report has been created using voice recognition software. It may contain minor errors which are inherent in voice recognition technology. **      Final report electronically signed by Dr. Echo Pollock on 9/17/2020 4:32 PM      XR CHEST PA INSPIRATION 1 VW   Final Result   No pneumothorax post right thoracentesis. Note should be made that internal medicine was contacted and stated they definitely warranted a right thoracentesis to proceed a left thoracentesis. **This report has been created using voice recognition software. It may contain minor errors which are inherent in voice recognition technology. **      Final report electronically signed by Dr. Echo Pollock on 9/17/2020 4:16 PM      CT CHEST WO CONTRAST   Final Result   Bilateral pleural effusions. Left upper and lower lobe consolidations. Nodular densities right lung. Cardiomegaly with small pericardial effusion. **This report has been created using voice recognition software. It may contain minor errors which are inherent in voice recognition technology. **      Final report electronically signed by Dr. Imani Neri on 9/16/2020 11:13 PM      CT ABDOMEN PELVIS WO CONTRAST Additional Contrast? None   Final Result   1. Small amount of ascites in the pelvis   2. Hepatomegaly   3. Cholelithiasis   4. Bilateral nonobstructive nephrolithiasis. **This report has been created using voice recognition software. It may contain minor errors which are inherent in voice recognition technology. **      Final report electronically signed by Dr. Imani Neri on 9/16/2020 11:16 PM      XR CHEST PORTABLE   Final Result   Congestive failure with complete opacification of the left lower lobe likely due to combination of consolidation and pleural fluid.  Probable posteriorly layered right effusion versus developing infiltrate            **This report has been created using voice recognition software. It may contain minor errors which are inherent in voice recognition technology. **      Final report electronically signed by Dr. Kerry Boo on 9/16/2020 10:33 PM          DVT prophylaxis: [] Lovenox                                 [x] SCDs                                 [] SQ Heparin                                 [] Encourage ambulation           [] Already on Anticoagulation     Code Status: DNR-CC    PT/OT Eval Status: Yes    Tele:   [x] yes             [] no        Electronically signed by Irma Villavicencio MD on 9/25/2020 at 9:50 AM

## 2020-09-25 NOTE — PLAN OF CARE
Problem: Falls - Risk of:  Goal: Will remain free from falls  Description: Will remain free from falls  Outcome: Ongoing  Note: Free from falls, patient does not get out of bed. Bed alarm on. Uses call light appropriately. Problem: Skin Integrity:  Goal: Will show no infection signs and symptoms  Description: Will show no infection signs and symptoms  Outcome: Ongoing  Note: No signs or symptoms of infection at this time. Problem: Respiratory  Goal: O2 Sat > 90%  Outcome: Ongoing  Note: Weaning off hi flow per orders for hospice. Problem: GI  Goal: No bowel complications  Outcome: Ongoing  Note: No BM this shift, bowel sounds active. Problem:   Goal: Adequate urinary output  Outcome: Ongoing  Note: Patient has capone for comfort. Problem: Nutrition  Goal: Optimal nutrition therapy  Outcome: Ongoing  Note: Patient has adequate appetite. Problem: Musculor/Skeletal Functional Status  Goal: Highest potential functional level  Outcome: Ongoing  Note: Limited movement, weakness in all extremities. Problem: ABCDS Injury Assessment  Goal: Absence of physical injury  Outcome: Ongoing  Note: Absent of physical injury and falls this shift. Care plan reviewed with patient. Patient verbalize understanding of the plan of care and contribute to goal setting.

## 2020-09-26 PROCEDURE — 2700000000 HC OXYGEN THERAPY PER DAY

## 2020-09-26 PROCEDURE — 1200000000 HC SEMI PRIVATE

## 2020-09-26 PROCEDURE — 99232 SBSQ HOSP IP/OBS MODERATE 35: CPT | Performed by: INTERNAL MEDICINE

## 2020-09-26 PROCEDURE — 2500000003 HC RX 250 WO HCPCS: Performed by: INTERNAL MEDICINE

## 2020-09-26 PROCEDURE — 94760 N-INVAS EAR/PLS OXIMETRY 1: CPT

## 2020-09-26 PROCEDURE — 6360000002 HC RX W HCPCS: Performed by: INTERNAL MEDICINE

## 2020-09-26 RX ORDER — GLYCOPYRROLATE 1 MG/5 ML
0.2 SYRINGE (ML) INTRAVENOUS 2 TIMES DAILY PRN
Status: DISCONTINUED | OUTPATIENT
Start: 2020-09-26 | End: 2020-10-01 | Stop reason: HOSPADM

## 2020-09-26 RX ADMIN — MORPHINE SULFATE 1 MG: 2 INJECTION, SOLUTION INTRAMUSCULAR; INTRAVENOUS at 12:29

## 2020-09-26 RX ADMIN — LORAZEPAM 1 MG: 2 INJECTION INTRAMUSCULAR; INTRAVENOUS at 03:53

## 2020-09-26 RX ADMIN — Medication 0.2 MG: at 03:53

## 2020-09-26 RX ADMIN — LORAZEPAM 1 MG: 2 INJECTION INTRAMUSCULAR; INTRAVENOUS at 13:23

## 2020-09-26 RX ADMIN — MORPHINE SULFATE 1 MG: 2 INJECTION, SOLUTION INTRAMUSCULAR; INTRAVENOUS at 08:07

## 2020-09-26 ASSESSMENT — PAIN SCALES - GENERAL
PAINLEVEL_OUTOF10: 0
PAINLEVEL_OUTOF10: 7
PAINLEVEL_OUTOF10: 0
PAINLEVEL_OUTOF10: 7

## 2020-09-26 NOTE — PROGRESS NOTES
Hospitalist Progress Note    Patient:  Abbie Bach      Unit/Bed:4K-13/013-A    YOB: 1940    MRN: [id number]       Acct: [de-identified]     PCP: Horacio Varner DO    Date of Admission: 9/16/2020      Assessment/Plan:  Active Hospital Problems    Diagnosis Date Noted    Mucus plugging of bronchi [J98.09]     Moderate malnutrition ([address] [E44.0] 09/18/2020    Malignant neoplasm of upper lobe of lung ([address] [C34.10]     Acute on chronic respiratory failure with hypoxia ([address] [J96.21]     Status post thoracentesis [Z98.890]     Bilateral pleural effusion [J90] 09/16/2020       Comfort Care Status: patient is on comfort measures. PRN morphine, Ativan, and Robinul to control symptoms. Hospice is following along. PRN breathing treatments available, however patient has been refusing. Continue symptomatic management. See below for hospital diagnoses:    Acute on Chronic Hypoxic Resp Failure: Related to pleural effusions, mucus plugging and lung cancer    Bilateral Pleural Effusions: s/p thoracentesis bilaterally, pleurex now placed on left. Appears consistent with transudate, no malignant cells and rare atypical cells noted in left Thora. Stage I Lung Adeno: followed with Rad/Onc PTA, had not received any treatments     PAF    COPD    CAD    HTN    Tobacco abuse    Hx of alcohol abuse        Expected discharge date:  tbd    Disposition: hospice - SW/CM working on case as far as dispo plans. Will re-eval Monday. See their notes for further details. [] Home       [] TCU       [] Rehab       [] Psych       [] SNF       [] [place]       [] Other-    --------------------------------------------    Chief Complaint: Weakness    Hospital Course: Patient is a 82-year-old male with past medical history as listed above, who presents with weakness and shortness of breath.   He was recently diagnosed with stage I adenocarcinoma of the lung, and was scheduled to start radiation therapy however was found to have bilateral pleural effusions as outpatient, thus his therapy was canceled and he was to be drained then with radiation therapy after on 9/17, however presented to the ED by his ex-wife for ongoing fatigue. He was started on oxygen in the ER. He had a left thoracentesis on 9/18 as well as a right thoracentesis on 9/17, however developed progressive hypoxia requiring high flow nasal cannula thus pulmonary was consulted and patient had bronchoscopy on 9/19 which showed a mucous plug in the left mainstem bronchus. The patient did develop recurrence of the left-sided pleural effusion and ultimately had pigtail catheter placed on 9/22. The patient was scheduled for a bronchoscopy on 9/23, however after further discussion with pulmonary the patient declined further invasive evaluation and opted for comfort measures. Hospice was consulted on 9/23. Patient remained on high flow nasal cannula but decided he did not wish to continue on this, and has continued on nasal cannula 6 L. Subjective (past 24 hours): Patient seen at [facility], ex-wife present in room. He has been receiving intermittent morphine and Ativan. He remains on 6 L and saturations in the 70 to 80% range. He denies any shortness of breath currently, but does get occasional anxiety and dyspnea. Overall he appears comfortable. No other issues or concerns at this time.       Medications:  Reviewed    Infusion Medications   Scheduled Medications    lidocaine  1 patch Transdermal Daily     PRN Meds: glycopyrrolate, morphine, LORazepam, acetaminophen **OR** acetaminophen, polyethylene glycol, promethazine **OR** ondansetron      Intake/Output Summary (Last 24 hours) at 9/26/2020 1349  Last data filed at 9/26/2020 0357  Gross per 24 hour   Intake 0 ml   Output 1375 ml   Net -1375 ml     Weight change:       Exam:  /85   Pulse 85   Temp 98.1 °F (36.7 °C) (Axillary)   Resp 18   Ht 6' (1.829 m)   Wt 154 lb 3.2 oz (69.9 kg)   SpO2 (!) 71% Comment: Pt on hospice and is breathing comfortable on the 6 lpm/nc - Spoke with Dr Coates and he is ok with the 71% SPO2 as long as pt is comfortable. BMI 20.91 kg/m²     General appearance: Acute on chronically ill - cachectic  Eyes:  Pupils equal, round, and reactive to light. Conjunctivae/corneas clear. HENT: Head normal in appearance. External nares normal.  Oral mucosa dry without lesions. Hearing grossly intact. Neck: Supple, with full range of motion. No jugular venous distention. Trachea midline. Respiratory:  Slight tachypnea, shallow breaths with decreased sounds overall. No wheezing or rhonchi. Cardiovascular: Normal rate, regular rhythm with normal S1/S2 without murmurs. No lower extremity edema. Abdomen: Soft, non-tender, non-distended with normal bowel sounds. Musculoskeletal:  There is no joint swelling or tenderness. Generalized weakness  Skin: Skin color, texture, turgor poor  Neurologic:  Neurovascularly intact without any focal sensory/motor deficits in the upper and lower extremities. Cranial nerves:  grossly non-focal.  Psychiatric: Alert and oriented, thought content appropriate, normal insight. Capillary Refill: Slow,< 3 seconds. Peripheral Pulses: +2 palpable, equal bilaterally. Labs:   Recent Labs     09/24/20  0537   WBC 10.2   HGB 9.2*   HCT 30.0*        No results for input(s): NA, K, CL, CO2, BUN, CREATININE, CALCIUM, PHOS in the last 72 hours. Invalid input(s): MAGNES  No results for input(s): AST, ALT, BILIDIR, BILITOT, ALKPHOS in the last 72 hours. No results for input(s): INR in the last 72 hours. No results for input(s): Demetria Beat in the last 72 hours.     Microbiology:      Urinalysis:      Lab Results   Component Value Date    NITRU NEGATIVE 09/16/2020    WBCUA 2-4 09/16/2020    BACTERIA NONE SEEN 09/16/2020    RBCUA 3-5 09/16/2020    BLOODU NEGATIVE 09/16/2020    GLUCOSEU NEGATIVE 09/16/2020 Radiology:  XR CHEST PORTABLE   Final Result   Exam Date and Exam Time: 09/24/2020 03:35 AM      Accession: [id number]      Reason for exam: left pleural effusion w/left pigtail      Ordering Diagnosis: Pleural effusion      Impression: 9/23/20. Findings:      Marked improved aeration left lung with patchy ill-defined opacities in    the left mid and lower lung zones. Considering the slight differences in    technique and positioning the overall appearance of the right upper lobe    ill-defined consolidation has mildly increased particularly in the lower    distribution. Ill-defined opacity in the right lower lung zone is    relatively unchanged. Cardiomegaly. Left thoracostomy tube terminates    within the left infrahilar region. IMPRESSION:      Marked reduction of left pleural effusion with improved aeration left    lung. Mild increased ill-defined opacification in the right upper lobe most    consolidated at the lower distribution. Bibasilar ill-defined opacities favoring layering pleural effusions with    atelectasis and/or pneumonia. This document has been electronically signed by: Julisa May. Mitch Deal on    09/24/2020 07:17 AM         XR CHEST PORTABLE   Final Result   Left pleural catheter with persistent opacification of the left    hemithorax. No pneumothorax. This document has been electronically signed by: Juana Pulido MD on    09/23/2020 02:58 AM         XR CHEST PORTABLE   Final Result   It is unclear whether the small left apical pneumothorax persists, though it certainly has not enlarged since previous. **This report has been created using voice recognition software. It may contain minor errors which are inherent in voice recognition technology. **      Final report electronically signed by Dr. Ness Melissa on 9/22/2020 3:47 PM      XR CHEST PORTABLE   Final Result   A small left pneumothorax is seen.  A short-term follow-up chest x-ray will be created using voice recognition software. It may contain minor errors which are inherent in voice recognition technology. **      Final report electronically signed by Dr. Kaila Duncan on 9/21/2020 12:33 PM      XR CHEST PORTABLE   Final Result   There is complete opacification of the left hemithorax which may be due to a large pleural effusion. Appearance of the chest is worse when compared to the previous exam.               **This report has been created using voice recognition software. It may contain minor errors which are inherent in voice recognition technology. **      Final report electronically signed by Dr Manuel Montez on 9/21/2020 6:20 AM      XR CHEST PORTABLE   Final Result   Cardiomegaly and pulmonary vascular congestion with small bilateral pleural effusions. The appearance of the chest has slightly improved when compared to the previous study. **This report has been created using voice recognition software. It may contain minor errors which are inherent in voice recognition technology. **      Final report electronically signed by Dr Manuel Montez on 9/20/2020 2:28 AM      XR ABDOMEN FOR NG/OG/NE TUBE PLACEMENT   Final Result   1. There is an enteric tube present coursing below the diaphragm, the distal portions of which are excluded from the current examination. However the distal side-port projects over the gastric body. **This report has been created using voice recognition software. It may contain minor errors which are inherent in voice recognition technology. **      Final report electronically signed by Dr. Shadi Norwood on 9/19/2020 6:56 PM      XR CHEST PORTABLE   Final Result   1. Significantly improved aeration of the left lung. However there is partial exclusion of the left lateral thorax and examination. 2. Interval placement of endotracheal tube with tip overlying the trachea approximately 7.1 cm above the malaika. There are 3.  Diffuse bilateral interstitial opacities are present overlying the visualized lung. This likely represents underlying pulmonary    edema. 4. Mild hazy opacity at the right lung base may indicate small underlying right-sided pleural fluid. **This report has been created using voice recognition software. It may contain minor errors which are inherent in voice recognition technology. **      Final report electronically signed by Dr. Radha Fu on 9/19/2020 6:55 PM      XR CHEST PORTABLE   Final Result   Worsening appearance of the chest with now complete opacification of the left lung   Mucous plugging be a consideration. **This report has been created using voice recognition software. It may contain minor errors which are inherent in voice recognition technology. **      Final report electronically signed by Dr. Arcadio Morales on 9/19/2020 3:14 AM      XR CHEST PORTABLE   Final Result   1. Mild to moderate cardiomegaly. Moderate-sized pleural effusion left side, slightly improved from earlier. Tiny effusion right side. No pneumothorax seen, however. 2. Moderate pneumonia/pulmonary edema scattered diffusely in the right lung and likely diffusely in the left lung is well. 3. Overall appearance of chest not significantly changed from earlier with the exception of slightly small pleural effusion left side. **This report has been created using voice recognition software. It may contain minor errors which are inherent in voice recognition technology. **      Final report electronically signed by Dr. Jose Hart on 9/18/2020 2:52 PM      XR CHEST 1 VIEW   Final Result   No pneumothorax post left thoracentesis. **This report has been created using voice recognition software. It may contain minor errors which are inherent in voice recognition technology. **      Final report electronically signed by Dr. Robinson Kilgore on 9/18/2020 1:51 PM      US THORACENTESIS   Final Result      Successful ultrasound-guided thoracentesis with  1.4 L of fluid drained. Patient was specifically informed that his large left pleural effusion would likely have to be drained in two procedures. **This report has been created using voice recognition software. It may contain minor errors which are inherent in voice recognition technology. **      Final report electronically signed by Dr. Nadir Gan on 9/18/2020 3:50 PM      US THORACENTESIS   Final Result      Successful ultrasound-guided thoracentesis with  0.92 L of fluid drained. **This report has been created using voice recognition software. It may contain minor errors which are inherent in voice recognition technology. **      Final report electronically signed by Dr. Nadir Gan on 9/17/2020 4:32 PM      XR CHEST PA INSPIRATION 1 VW   Final Result   No pneumothorax post right thoracentesis. Note should be made that internal medicine was contacted and stated they definitely warranted a right thoracentesis to proceed a left thoracentesis. **This report has been created using voice recognition software. It may contain minor errors which are inherent in voice recognition technology. **      Final report electronically signed by Dr. Nadir Gan on 9/17/2020 4:16 PM      CT CHEST WO CONTRAST   Final Result   Bilateral pleural effusions. Left upper and lower lobe consolidations. Nodular densities right lung. Cardiomegaly with small pericardial effusion. **This report has been created using voice recognition software. It may contain minor errors which are inherent in voice recognition technology. **      Final report electronically signed by Dr. Peña Lee on 9/16/2020 11:13 PM      CT ABDOMEN PELVIS WO CONTRAST Additional Contrast? None   Final Result   1. Small amount of ascites in the pelvis   2. Hepatomegaly   3. Cholelithiasis   4. Bilateral nonobstructive nephrolithiasis.             **This report has been created using voice recognition software. It may contain minor errors which are inherent in voice recognition technology. **      Final report electronically signed by Dr. Penny Rubio on 9/16/2020 11:16 PM      XR CHEST PORTABLE   Final Result   Congestive failure with complete opacification of the left lower lobe likely due to combination of consolidation and pleural fluid. Probable posteriorly layered right effusion versus developing infiltrate            **This report has been created using voice recognition software. It may contain minor errors which are inherent in voice recognition technology. **      Final report electronically signed by Dr. Penny Rubio on 9/16/2020 10:33 PM          DVT prophylaxis:     Code Status: DNR-CC        Diet:   DIET GENERAL;  Dietary Nutrition Supplements: Standard High Calorie Oral Supplement        Electronically signed by Rey Mark DO on 9/26/2020 at 1:49 PM

## 2020-09-26 NOTE — FLOWSHEET NOTE
[facility] Patient's Mateo Dumont present for him. They were  about 3 years ago after being  for quite some time. Patient has no family in the [address] - he is from [facility]. Some of Carlos's family have come to support her but she is pretty much alone around here. She finds herself surprised at her feelings about [place] dying. Her sig other from after the divorce  about a year ago and she has not completely processed that loss- now she is losing Mahesh. She also lost a dear friend recently. Encouraged carlos not to marginalize her grief simply bc they are not  at this time. Gave her OP  brochure and S/S grief sheet. Encouraged her to reach out for support from chaplains. 20 1900   Encounter Summary   Services provided to: Patient;Significant other   Referral/Consult From: Scott   Continue Visiting Yes  ( EOL)   Complexity of Encounter Moderate   Length of Encounter 30 minutes   Grief and Life Adjustment   Type End of life   Assessment Tearful; Anticipatory grief;Unresolved grief   Intervention Active listening;Explored feelings, thoughts, concerns;Explored coping resources;Nurtured hope;Provided reading materials/devotional materials; Discussed illness/injury and it's impact; Discussed belief system/Buddhist practices/tang;Grief care   Outcome Comfort;Expressed gratitude;Receptive 2 1

## 2020-09-26 NOTE — PROGRESS NOTES
Problem: Falls - Risk of:  Goal: Will remain free from falls  Description: Will remain free from falls  Outcome: Ongoing  Note: Free from falls, patient does not get out of bed. Bed alarm on. Uses call light appropriately. Problem: Skin Integrity:  Goal: Will show no infection signs and symptoms  Description: Will show no infection signs and symptoms  Outcome: Ongoing  Note: No signs or symptoms of infection at this time. Problem: Respiratory  Goal: O2 Sat > 90%  Outcome: Ongoing  Note: on 6L NC for comfort, patient is hospice so staff is not monitoring spo2 levels      Problem: GI  Goal: No bowel complications  Outcome: Ongoing  Note: No BM this shift, bowel sounds active. Problem:   Goal: Adequate urinary output  Outcome: Ongoing  Note: Patient has capone for comfort. Problem: Nutrition  Goal: Optimal nutrition therapy  Outcome: Ongoing  Note: decreased appetite, general diet      Problem: Musculor/Skeletal Functional Status  Goal: Highest potential functional level  Outcome: Ongoing  Note: Limited movement, weakness in all extremities. Problem: ABCDS Injury Assessment  Goal: Absence of physical injury  Outcome: Ongoing  Note: Absent of physical injury and falls this shift. Care plan reviewed with patient.   Patient verbalize understanding of the plan of care and contribute to goal setting.

## 2020-09-26 NOTE — PROGRESS NOTES
Pt admitted to  5K20 by cart/stretcher from . IV into the forearm right, condition patent and no redness. IV site free of s/s of infection or infiltration. Vital signs obtained. Assessment complete. Oriented to room. All questions answered with no further questions at this time. Two nurse skin assessment performed by Lesley VELEZ and Charlotte Lackey RN. Oriented to room. Policies and procedures for  explained. A Fall prevention and safety brochure discussed with patient. Bed alarm on. Call light in reach.

## 2020-09-27 PROCEDURE — 99232 SBSQ HOSP IP/OBS MODERATE 35: CPT | Performed by: INTERNAL MEDICINE

## 2020-09-27 PROCEDURE — 1200000000 HC SEMI PRIVATE

## 2020-09-27 PROCEDURE — 6360000002 HC RX W HCPCS: Performed by: INTERNAL MEDICINE

## 2020-09-27 RX ADMIN — MORPHINE SULFATE 1 MG: 2 INJECTION, SOLUTION INTRAMUSCULAR; INTRAVENOUS at 14:05

## 2020-09-27 RX ADMIN — MORPHINE SULFATE 1 MG: 2 INJECTION, SOLUTION INTRAMUSCULAR; INTRAVENOUS at 22:25

## 2020-09-27 RX ADMIN — LORAZEPAM 1 MG: 2 INJECTION INTRAMUSCULAR; INTRAVENOUS at 15:34

## 2020-09-27 RX ADMIN — MORPHINE SULFATE 1 MG: 2 INJECTION, SOLUTION INTRAMUSCULAR; INTRAVENOUS at 00:39

## 2020-09-27 ASSESSMENT — PAIN SCALES - GENERAL
PAINLEVEL_OUTOF10: 0
PAINLEVEL_OUTOF10: 7
PAINLEVEL_OUTOF10: 0

## 2020-09-27 NOTE — PROGRESS NOTES
Reviewed previous 24 hr documentation and updated by primary [facility] RN. Pt rested comfortably overnight with 1 mg morphine x1. Pt remains on 6L oxygen. The patient does not meet inpatient hospice criteria at this time and will remain available for care consult with follow up Monday to assist with discharge planning.

## 2020-09-27 NOTE — PLAN OF CARE
Problem: Falls - Risk of:  Goal: Will remain free from falls  Description: Will remain free from falls  Outcome: Met This Shift  Fall assessment completed. Patient using call light appropriately to call for assistance with ambulation to bathroom. Personal items within reach. Patient is also compliant with use of non-skid slippers. Problem: Skin Integrity:  Goal: Will show no infection signs and symptoms  Description: Will show no infection signs and symptoms  Outcome: Ongoing  No skin breakdown this shift. Patient being assisted with turning. Patients states understanding of repositioning every two hours. Problem: Respiratory  Goal: O2 Sat > 90%  Outcome: Ongoing  Patients oxygen saturation 74 % on 6 L02 per Nasal Canula. No shortness of breath noted. Lung sounds dim. , able C&DB as ordered. Problem: GI  Goal: No bowel complications  Outcome: Ongoing   Patient bowel sounds active. Passing flatus. Pt taking prescribed medication to assist with BM. Problem:   Goal: Adequate urinary output  Outcome: Ongoing  Patient has capone cath in place for comfort care. Problem: Nutrition  Goal: Optimal nutrition therapy  Outcome: Ongoing  Patients appetite good  Continuing to encourage fluids. Problem: Musculor/Skeletal Functional Status  Goal: Highest potential functional level  Outcome: Ongoing  Patient on bed rest. Turn every 2 hours. Care plan reviewed with patient and family. Patient and family verbalize understanding of the plan of care and contribute to goal setting.

## 2020-09-27 NOTE — PROGRESS NOTES
Hospitalist Progress Note    Patient:  Carmen Gravely      Unit/Bed:5K-20/020-A    YOB: 1940    MRN: [id number]       Acct: [de-identified]     PCP: Carmela Crowley DO    Date of Admission: 9/16/2020      Assessment/Plan:  Active Hospital Problems    Diagnosis Date Noted    Mucus plugging of bronchi [J98.09]     Moderate malnutrition ([facility] [E44.0] 09/18/2020    Malignant neoplasm of upper lobe of lung ([facility] [C34.10]     Acute on chronic respiratory failure with hypoxia ([facility] [J96.21]     Status post thoracentesis [Z98.890]     Bilateral pleural effusion [J90] 09/16/2020       Comfort Care Status: patient is on comfort measures. PRN morphine, Ativan, and Robinul to control symptoms. Hospice is following along. PRN breathing treatments available, however patient has been refusing. Continue symptomatic management. No major changes today, other than patient moved to [address] [place] [address]. See below for hospital diagnoses:    Acute on Chronic Hypoxic Resp Failure: Related to pleural effusions, mucus plugging and lung cancer    Bilateral Pleural Effusions: s/p thoracentesis bilaterally, pleurex now placed on left. Appears consistent with transudate, no malignant cells and rare atypical cells noted in left Thora. Stage I Lung Adeno: followed with Rad/Onc PTA, had not received any treatments     PAF    COPD    CAD    HTN    Tobacco abuse    Hx of alcohol abuse        Expected discharge date:  tbd    Disposition: hospice - SW/CM working on case as far as dispo plans. Will re-eval Monday. See their notes for further details. [] Home       [] TCU       [] Rehab       [] Psych       [] SNF       [] [facility]       [] Other-    --------------------------------------------    Chief Complaint: Weakness    Hospital Course: Patient is a 79-year-old male with past medical history as listed above, who presents with weakness and shortness of breath.   He was recently diagnosed with stage I adenocarcinoma of the lung, and was scheduled to start radiation therapy however was found to have bilateral pleural effusions as outpatient, thus his therapy was canceled and he was to be drained then with radiation therapy after on 9/17, however presented to the ED by his ex-wife for ongoing fatigue. He was started on oxygen in the ER. He had a left thoracentesis on 9/18 as well as a right thoracentesis on 9/17, however developed progressive hypoxia requiring high flow nasal cannula thus pulmonary was consulted and patient had bronchoscopy on 9/19 which showed a mucous plug in the left mainstem bronchus. The patient did develop recurrence of the left-sided pleural effusion and ultimately had pigtail catheter placed on 9/22. The patient was scheduled for a bronchoscopy on 9/23, however after further discussion with pulmonary the patient declined further invasive evaluation and opted for comfort measures. Hospice was consulted on 9/23. Patient remained on high flow nasal cannula but decided he did not wish to continue on this, and has continued on nasal cannula 6 L. Subjective (past 24 hours): Patient seen at bedside. Reports he is comfortable. Was given morphine for discomfort this am. Tachypneic but appears to be resting well, sats maintained in the 70-80s.        Medications:  Reviewed    Infusion Medications   Scheduled Medications    lidocaine  1 patch Transdermal Daily     PRN Meds: glycopyrrolate, morphine, LORazepam, acetaminophen **OR** acetaminophen, polyethylene glycol, promethazine **OR** ondansetron      Intake/Output Summary (Last 24 hours) at 9/27/2020 1439  Last data filed at 9/27/2020 1347  Gross per 24 hour   Intake 180 ml   Output 850 ml   Net -670 ml     Weight change:       Exam:  BP (!) 147/66   Pulse 94   Temp 97.7 °F (36.5 °C) (Oral)   Resp 16   Ht 6' (1.829 m)   Wt 154 lb 3.2 oz (69.9 kg)   SpO2 (!) 71%   BMI 20.91 kg/m²     General appearance: Acute on chronically ill - cachectic  Eyes:  Pupils equal, round, and reactive to light. Conjunctivae/corneas clear. HENT: Head normal in appearance. External nares normal.  Oral mucosa dry without lesions. Hearing grossly intact. Neck: Supple, with full range of motion. No jugular venous distention. Trachea midline. Respiratory:  Slight tachypnea, shallow breaths with decreased sounds overall. No wheezing or rhonchi. Cardiovascular: Normal rate, regular rhythm with normal S1/S2 without murmurs. No lower extremity edema. Abdomen: Soft, non-tender, mild-distended with normal bowel sounds. Musculoskeletal:  There is no joint swelling or tenderness. Generalized weakness  Skin: Skin color, texture, turgor poor  Neurologic:  Neurovascularly intact without any focal sensory/motor deficits in the upper and lower extremities. Cranial nerves:  grossly non-focal.  Psychiatric: Somnolent but able to answer questions. Labs:   No results for input(s): WBC, HGB, HCT, PLT in the last 72 hours. No results for input(s): NA, K, CL, CO2, BUN, CREATININE, CALCIUM, PHOS in the last 72 hours. Invalid input(s): MAGNES  No results for input(s): AST, ALT, BILIDIR, BILITOT, ALKPHOS in the last 72 hours. No results for input(s): INR in the last 72 hours. No results for input(s): Brandon Breeze in the last 72 hours. Microbiology:      Urinalysis:      Lab Results   Component Value Date    NITRU NEGATIVE 09/16/2020    WBCUA 2-4 09/16/2020    BACTERIA NONE SEEN 09/16/2020    RBCUA 3-5 09/16/2020    BLOODU NEGATIVE 09/16/2020    GLUCOSEU NEGATIVE 09/16/2020       Radiology:  XR CHEST PORTABLE   Final Result   Exam Date and Exam Time: 09/24/2020 03:35 AM      Accession: [id number]      Reason for exam: left pleural effusion w/left pigtail      Ordering Diagnosis: Pleural effusion      Impression: 9/23/20. Findings:      Marked improved aeration left lung with patchy ill-defined opacities in    the left mid and lower lung zones. **This report has been created using voice recognition software. It may contain minor errors which are inherent in voice recognition technology. **      Final report electronically signed by Dr. Tj Maciel on 9/22/2020 11:25 AM      XR CHEST PORTABLE   Final Result   1. Almost complete opacification of the left hemithorax, likely secondary to large pleural effusion. 2. Small right-sided pleural effusion. 3. Extensive right lung atelectasis/infiltrate. **This report has been created using voice recognition software. It may contain minor errors which are inherent in voice recognition technology. **      Final report electronically signed by Dr. Kelsey Hunter on 9/22/2020 8:26 AM      VL DUP LOWER EXTREMITY VENOUS BILATERAL   Final Result      1. No sonographic evidence of lower extremity DVT. **This report has been created using voice recognition software. It may contain minor errors which are inherent in voice recognition technology. **      Final report electronically signed by Dr. Rosmery Pearson on 9/21/2020 2:08 PM      XR CHEST PA INSPIRATION 1 VW   Final Result   No pneumothorax post left thoracentesis. **This report has been created using voice recognition software. It may contain minor errors which are inherent in voice recognition technology. **      Final report electronically signed by Dr. Tj Maciel on 9/21/2020 1:50 PM      US THORACENTESIS   Final Result      Successful ultrasound-guided thoracentesis with  1.4 L of fluid drained. **This report has been created using voice recognition software. It may contain minor errors which are inherent in voice recognition technology. **      Final report electronically signed by Dr. Tj Maciel on 9/21/2020 12:33 PM      XR CHEST PORTABLE   Final Result   There is complete opacification of the left hemithorax which may be due to a large pleural effusion.  Appearance of the chest is worse when compared to the previous exam.               **This report has been created using voice recognition software. It may contain minor errors which are inherent in voice recognition technology. **      Final report electronically signed by Dr Claudene Patch on 9/21/2020 6:20 AM      XR CHEST PORTABLE   Final Result   Cardiomegaly and pulmonary vascular congestion with small bilateral pleural effusions. The appearance of the chest has slightly improved when compared to the previous study. **This report has been created using voice recognition software. It may contain minor errors which are inherent in voice recognition technology. **      Final report electronically signed by Dr Claudene Patch on 9/20/2020 2:28 AM      XR ABDOMEN FOR NG/OG/NE TUBE PLACEMENT   Final Result   1. There is an enteric tube present coursing below the diaphragm, the distal portions of which are excluded from the current examination. However the distal side-port projects over the gastric body. **This report has been created using voice recognition software. It may contain minor errors which are inherent in voice recognition technology. **      Final report electronically signed by Dr. Brian Jaimes on 9/19/2020 6:56 PM      XR CHEST PORTABLE   Final Result   1. Significantly improved aeration of the left lung. However there is partial exclusion of the left lateral thorax and examination. 2. Interval placement of endotracheal tube with tip overlying the trachea approximately 7.1 cm above the malaika. There are 3. Diffuse bilateral interstitial opacities are present overlying the visualized lung. This likely represents underlying pulmonary    edema. 4. Mild hazy opacity at the right lung base may indicate small underlying right-sided pleural fluid. **This report has been created using voice recognition software. It may contain minor errors which are inherent in voice recognition technology. **      Final report electronically signed by Dr. Zita Lucio on 9/19/2020 6:55 PM      XR CHEST PORTABLE   Final Result   Worsening appearance of the chest with now complete opacification of the left lung   Mucous plugging be a consideration. **This report has been created using voice recognition software. It may contain minor errors which are inherent in voice recognition technology. **      Final report electronically signed by Dr. Nereyda Love on 9/19/2020 3:14 AM      XR CHEST PORTABLE   Final Result   1. Mild to moderate cardiomegaly. Moderate-sized pleural effusion left side, slightly improved from earlier. Tiny effusion right side. No pneumothorax seen, however. 2. Moderate pneumonia/pulmonary edema scattered diffusely in the right lung and likely diffusely in the left lung is well. 3. Overall appearance of chest not significantly changed from earlier with the exception of slightly small pleural effusion left side. **This report has been created using voice recognition software. It may contain minor errors which are inherent in voice recognition technology. **      Final report electronically signed by Dr. Sulma Epps on 9/18/2020 2:52 PM      XR CHEST 1 VIEW   Final Result   No pneumothorax post left thoracentesis. **This report has been created using voice recognition software. It may contain minor errors which are inherent in voice recognition technology. **      Final report electronically signed by Dr. Robert Lepe on 9/18/2020 1:51 PM      US THORACENTESIS   Final Result      Successful ultrasound-guided thoracentesis with  1.4 L of fluid drained. Patient was specifically informed that his large left pleural effusion would likely have to be drained in two procedures. **This report has been created using voice recognition software. It may contain minor errors which are inherent in voice recognition technology. **      Final report electronically signed by  Mel Valerio on 9/18/2020 3:50 PM      US THORACENTESIS   Final Result      Successful ultrasound-guided thoracentesis with  0.92 L of fluid drained. **This report has been created using voice recognition software. It may contain minor errors which are inherent in voice recognition technology. **      Final report electronically signed by Dr. Mel Valerio on 9/17/2020 4:32 PM      XR CHEST PA INSPIRATION 1 VW   Final Result   No pneumothorax post right thoracentesis. Note should be made that internal medicine was contacted and stated they definitely warranted a right thoracentesis to proceed a left thoracentesis. **This report has been created using voice recognition software. It may contain minor errors which are inherent in voice recognition technology. **      Final report electronically signed by Dr. Mel Valerio on 9/17/2020 4:16 PM      CT CHEST WO CONTRAST   Final Result   Bilateral pleural effusions. Left upper and lower lobe consolidations. Nodular densities right lung. Cardiomegaly with small pericardial effusion. **This report has been created using voice recognition software. It may contain minor errors which are inherent in voice recognition technology. **      Final report electronically signed by Dr. Christiano Sparks on 9/16/2020 11:13 PM      CT ABDOMEN PELVIS WO CONTRAST Additional Contrast? None   Final Result   1. Small amount of ascites in the pelvis   2. Hepatomegaly   3. Cholelithiasis   4. Bilateral nonobstructive nephrolithiasis. **This report has been created using voice recognition software. It may contain minor errors which are inherent in voice recognition technology. **      Final report electronically signed by Dr. Christiano Sparks on 9/16/2020 11:16 PM      XR CHEST PORTABLE   Final Result   Congestive failure with complete opacification of the left lower lobe likely due to combination of consolidation and pleural fluid.  Probable posteriorly layered right effusion versus developing infiltrate            **This report has been created using voice recognition software. It may contain minor errors which are inherent in voice recognition technology. **      Final report electronically signed by Dr. Brett Stephens on 9/16/2020 10:33 PM          DVT prophylaxis:     Code Status: DNR-CC        Diet:   DIET GENERAL;  Dietary Nutrition Supplements: Standard High Calorie Oral Supplement        Electronically signed by Edison Hatfield DO on 9/27/2020 at 2:39 PM

## 2020-09-28 PROCEDURE — 6360000002 HC RX W HCPCS: Performed by: INTERNAL MEDICINE

## 2020-09-28 PROCEDURE — 1200000000 HC SEMI PRIVATE

## 2020-09-28 PROCEDURE — 6370000000 HC RX 637 (ALT 250 FOR IP): Performed by: STUDENT IN AN ORGANIZED HEALTH CARE EDUCATION/TRAINING PROGRAM

## 2020-09-28 PROCEDURE — 99231 SBSQ HOSP IP/OBS SF/LOW 25: CPT | Performed by: NURSE PRACTITIONER

## 2020-09-28 RX ADMIN — MORPHINE SULFATE 1 MG: 2 INJECTION, SOLUTION INTRAMUSCULAR; INTRAVENOUS at 13:31

## 2020-09-28 RX ADMIN — LORAZEPAM 1 MG: 2 INJECTION INTRAMUSCULAR; INTRAVENOUS at 02:47

## 2020-09-28 RX ADMIN — MORPHINE SULFATE 1 MG: 2 INJECTION, SOLUTION INTRAMUSCULAR; INTRAVENOUS at 19:52

## 2020-09-28 RX ADMIN — MORPHINE SULFATE 1 MG: 2 INJECTION, SOLUTION INTRAMUSCULAR; INTRAVENOUS at 05:24

## 2020-09-28 RX ADMIN — MORPHINE SULFATE 1 MG: 2 INJECTION, SOLUTION INTRAMUSCULAR; INTRAVENOUS at 10:53

## 2020-09-28 ASSESSMENT — PAIN DESCRIPTION - ORIENTATION
ORIENTATION: LOWER;MID
ORIENTATION: LOWER;MID

## 2020-09-28 ASSESSMENT — PAIN SCALES - GENERAL
PAINLEVEL_OUTOF10: 3
PAINLEVEL_OUTOF10: 0
PAINLEVEL_OUTOF10: 6
PAINLEVEL_OUTOF10: 7
PAINLEVEL_OUTOF10: 7
PAINLEVEL_OUTOF10: 0
PAINLEVEL_OUTOF10: 0
PAINLEVEL_OUTOF10: 6

## 2020-09-28 ASSESSMENT — PAIN DESCRIPTION - LOCATION
LOCATION: ABDOMEN
LOCATION: ABDOMEN

## 2020-09-28 ASSESSMENT — PAIN SCALES - WONG BAKER
WONGBAKER_NUMERICALRESPONSE: 0
WONGBAKER_NUMERICALRESPONSE: 0
WONGBAKER_NUMERICALRESPONSE: 6

## 2020-09-28 ASSESSMENT — PAIN DESCRIPTION - ONSET
ONSET: ON-GOING
ONSET: ON-GOING

## 2020-09-28 ASSESSMENT — PAIN DESCRIPTION - DESCRIPTORS
DESCRIPTORS: PATIENT UNABLE TO DESCRIBE
DESCRIPTORS: PATIENT UNABLE TO DESCRIBE

## 2020-09-28 ASSESSMENT — PAIN DESCRIPTION - PAIN TYPE
TYPE: ACUTE PAIN
TYPE: ACUTE PAIN

## 2020-09-28 ASSESSMENT — PAIN DESCRIPTION - FREQUENCY
FREQUENCY: OTHER (COMMENT)
FREQUENCY: OTHER (COMMENT)

## 2020-09-28 NOTE — PROGRESS NOTES
Entered patient's room for hourly rounding and patient had turned in bed to right side, and left pleurex drain found to be dislodged and on the floor. Notified hospitalist at this time. Clean, dry, dressing placed and intact. Patient following with Hospice, no new orders at this time.    Electronically signed by Allie Alexander RN on 9/27/2020 at 10:31 PM

## 2020-09-28 NOTE — PLAN OF CARE
Patient turns self and is assisted with turning when needed. Patient with Briones intact. EPC on buttocks. Care plan reviewed with patient. Patient verbalize understanding of the plan of care and contribute to goal setting.     Electronically signed by Elisa Lezama RN on 9/28/2020 at 12:22 AM

## 2020-09-28 NOTE — PROGRESS NOTES
Patient in and out of consciousness. Respirations 13 breaths per minute. Patient with shallow labored breathing and use of accessory muscles used. States yes when asked if he'd like pain medication for help of breathing. PRN morphine given per orders.    Electronically signed by La Paula RN on 9/27/2020 at 10:33 PM

## 2020-09-28 NOTE — PROGRESS NOTES
Hospitalist Progress Note    Patient:  Carmen Olson      Unit/Bed:5K-20/020-A    YOB: 1940    MRN: [id number]       Acct: [de-identified]     PCP: Carmela Crowley DO    Date of Admission: 9/16/2020    Assessment/Plan:    1. End-of-life care--appreciate hospice care; I spoke with hospice nurse today and patient is not meeting inpatient hospice, this is going to be an extremely difficult situation for discharge  2. Acute on chronic hypoxic respiratory failure--on 6 L of oxygen and currently satting 74%  3. Bilateral pleural effusions--Lights criteria consistent with transudate; S/P multiple thoracentesis  4. Stage I lung adenocarcinoma  5. PAF  6. COPD  7. Essential hypertension, uncontrolled  8. CAD  9. Tobacco abuse    Expected discharge date: Unknown    Disposition:    [] Home       [] TCU       [] Rehab       [] Psych       [] SNF       [] [facility]       [x] Other-unsure at this time    Chief Complaint: End-of-life care    Hospital Course:  Patient is a 32-year-old male with past medical history as listed above, who presents with weakness and shortness of breath. He was recently diagnosed with stage I adenocarcinoma of the lung, and was scheduled to start radiation therapy however was found to have bilateral pleural effusions as outpatient, thus his therapy was canceled and he was to be drained then with radiation therapy after on 9/17, however presented to the ED by his ex-wife for ongoing fatigue. He was started on oxygen in the ER. He had a left thoracentesis on 9/18 as well as a right thoracentesis on 9/17, however developed progressive hypoxia requiring high flow nasal cannula thus pulmonary was consulted and patient had bronchoscopy on 9/19 which showed a mucous plug in the left mainstem bronchus. The patient did develop recurrence of the left-sided pleural effusion and ultimately had pigtail catheter placed on 9/22.   The patient was scheduled for a bronchoscopy on 9/23, however after further discussion with pulmonary the patient declined further invasive evaluation and opted for comfort measures. Hospice was consulted on 9/23. Patient remained on high flow nasal cannula but decided he did not wish to continue on this, and has continued on nasal cannula 6 L.    9/28--> patient is lying in bed eating a chocolate chip cookie; tempted to drink his Ensure without the plastic wrap off of the lid; I spoke with the hospice nurse; extremely difficult discharge situation; I also spoke to care facilitator and social work and social work skin to be calling the [facility] in [place] to look at options    Subjective (past 24 hours): Eating a chocolate chip cookie, no verbal response    Medications:  Reviewed    Infusion Medications   Scheduled Medications    lidocaine  1 patch Transdermal Daily     PRN Meds: glycopyrrolate, morphine, LORazepam, acetaminophen **OR** acetaminophen, polyethylene glycol, promethazine **OR** ondansetron      Intake/Output Summary (Last 24 hours) at 9/28/2020 1015  Last data filed at 9/27/2020 1347  Gross per 24 hour   Intake 180 ml   Output 450 ml   Net -270 ml       Diet:  DIET GENERAL;  Dietary Nutrition Supplements: Standard High Calorie Oral Supplement    Exam:  BP (!) 152/67   Pulse 70   Temp 96.9 °F (36.1 °C) (Oral)   Resp 18   Ht 6' (1.829 m)   Wt 154 lb 3.2 oz (69.9 kg)   SpO2 (!) 74%   BMI 20.91 kg/m²     General appearance: appears older than stated age and cooperative. Chronically ill looking; emaciated appearance  HEENT: Pupils equal, round, and reactive to light. Conjunctivae/corneas clear. Neck: Supple, with full range of motion. No jugular venous distention. Trachea midline. Respiratory: Tachypneic and diminished throughout. Cardiovascular: Regular rate and rhythm with normal S1/S2 without murmurs, rubs or gallops. Abdomen: Soft, non-tender, non-distended with normal bowel sounds.   Musculoskeletal: passive and active ROM x 4 extremities.   Skin: Skin color pasty, texture, turgor normal.    Neurologic: Awake   psychiatric: Awake but no verbal communication     Code Status: DNR-CC    Tele:   [] yes             [x] no    Active Hospital Problems    Diagnosis Date Noted    Mucus plugging of bronchi [J98.09]     Moderate malnutrition ([facility] [address] [E44.0] 09/18/2020    Malignant neoplasm of upper lobe of lung (HCC) [C34.10]     Acute on chronic respiratory failure with hypoxia (HCC) [J96.21]     Status post thoracentesis [Z98.890]     Bilateral pleural effusion [J90] 09/16/2020       Electronically signed by DANIEL Montez CNP on 9/28/2020 at 10:15 AM

## 2020-09-28 NOTE — PROGRESS NOTES
Patient continues to take NC off through the night. Patient alert and responsive at this time, and denies pain.    Electronically signed by Jany Reyes RN on 9/28/2020 at 12:49 AM

## 2020-09-29 PROCEDURE — 6360000002 HC RX W HCPCS: Performed by: INTERNAL MEDICINE

## 2020-09-29 PROCEDURE — 1200000003 HC TELEMETRY R&B

## 2020-09-29 PROCEDURE — 99231 SBSQ HOSP IP/OBS SF/LOW 25: CPT | Performed by: FAMILY MEDICINE

## 2020-09-29 PROCEDURE — 6370000000 HC RX 637 (ALT 250 FOR IP): Performed by: STUDENT IN AN ORGANIZED HEALTH CARE EDUCATION/TRAINING PROGRAM

## 2020-09-29 PROCEDURE — 1200000000 HC SEMI PRIVATE

## 2020-09-29 RX ORDER — PREDNISONE 20 MG/1
40 TABLET ORAL DAILY
Status: DISCONTINUED | OUTPATIENT
Start: 2020-09-29 | End: 2020-10-01 | Stop reason: HOSPADM

## 2020-09-29 RX ADMIN — PREDNISONE 40 MG: 20 TABLET ORAL at 14:03

## 2020-09-29 RX ADMIN — LORAZEPAM 1 MG: 2 INJECTION INTRAMUSCULAR; INTRAVENOUS at 16:01

## 2020-09-29 RX ADMIN — MORPHINE SULFATE 1 MG: 2 INJECTION, SOLUTION INTRAMUSCULAR; INTRAVENOUS at 13:19

## 2020-09-29 RX ADMIN — MORPHINE SULFATE 1 MG: 2 INJECTION, SOLUTION INTRAMUSCULAR; INTRAVENOUS at 19:48

## 2020-09-29 ASSESSMENT — PAIN DESCRIPTION - ORIENTATION
ORIENTATION: LOWER;MID
ORIENTATION: LOWER;MID

## 2020-09-29 ASSESSMENT — PAIN DESCRIPTION - PAIN TYPE
TYPE: ACUTE PAIN
TYPE: ACUTE PAIN

## 2020-09-29 ASSESSMENT — PAIN DESCRIPTION - LOCATION
LOCATION: ABDOMEN
LOCATION: ABDOMEN

## 2020-09-29 ASSESSMENT — PAIN DESCRIPTION - FREQUENCY
FREQUENCY: INTERMITTENT
FREQUENCY: INTERMITTENT

## 2020-09-29 ASSESSMENT — PAIN DESCRIPTION - DESCRIPTORS
DESCRIPTORS: PATIENT UNABLE TO DESCRIBE
DESCRIPTORS: PATIENT UNABLE TO DESCRIBE

## 2020-09-29 ASSESSMENT — PAIN SCALES - GENERAL
PAINLEVEL_OUTOF10: 7
PAINLEVEL_OUTOF10: 0
PAINLEVEL_OUTOF10: 0
PAINLEVEL_OUTOF10: 7
PAINLEVEL_OUTOF10: 7
PAINLEVEL_OUTOF10: 0

## 2020-09-29 ASSESSMENT — PAIN DESCRIPTION - ONSET
ONSET: ON-GOING
ONSET: ON-GOING

## 2020-09-29 NOTE — PROGRESS NOTES
PROGRESS NOTE      Patient:  Delmis Vogt      Unit/Bed:5K-20/020-A    YOB: 1940    MRN: [id number]       Acct: [de-identified]     PCP: Zohra Ramirez DO    Date of Admission: 9/16/2020      Assessment/Plan:    Active Hospital Problems    Diagnosis Date Noted    Mucus plugging of bronchi [J98.09]     Moderate malnutrition ([address] [E44.0] 09/18/2020    Malignant neoplasm of upper lobe of lung ([address] [C34.10]     Acute on chronic respiratory failure with hypoxia ([address] [J96.21]     Status post thoracentesis [Z98.890]     Bilateral pleural effusion [J90] 09/16/2020       1. End-of-life care:   -Hospice is following, patient not meeting inpatient hospice criteria yesterday.   -Likely did not meet inpatient hospice criteria again today  -Social work and care coordinator working on placement for patient at discharge  -There are no funds for ECF placement per family  -[address] possibly  -Patient may be home with hospice care    2. Acute on chronic hypoxic respiratory failure  -On 6 L of O2 saturating at 81%  -Patient appears comfortable  -No signs of respiratory distress  -Will add prednisone for wheezing    3. Bilateral pleural effusion  -Status post thoracentesis bilaterally. Consistent with transudate  -No malignant cells seen  -Possibly secondary to mucous plugging    4. Stage I lung adenocarcinoma  -Followed with radiation oncology  -Had not undergone any treatments    5. PAF  6. COPD  7. Essential hypertension  8. CAD  9. Tobacco abuse    Disposition: Patient requiring end-of-life care however at this time there are no funds for ECF placement and patient not meeting inpatient hospice criteria. Social work and care coordination working on discharge plan for patient. Will appreciate their recommendations.      Chief Complaint: End-of-life care    Hospital Course: Patient is a 72-year-old male with past medical history as listed above, who presents with weakness and shortness of breath.  He was recently diagnosed with stage I adenocarcinoma of the lung, and was scheduled to start radiation therapy however was found to have bilateral pleural effusions as outpatient, thus his therapy was canceled and he was to be drained then with radiation therapy after on 9/17, however presented to the ED by his ex-wife for ongoing fatigue.  He was started on oxygen in the ER. [facility] had a left thoracentesis on 9/18 as well as a right thoracentesis on 9/17, however developed progressive hypoxia requiring high flow nasal cannula thus pulmonary was consulted and patient had bronchoscopy on 9/19 which showed a mucous plug in the left mainstem bronchus.  The patient did develop recurrence of the left-sided pleural effusion and ultimately had pigtail catheter placed on 9/22.  The patient was scheduled for a bronchoscopy on 9/23, however after further discussion with pulmonary the patient declined further invasive evaluation and opted for comfort measures.  Hospice was consulted on 9/23. Mckenzie Curtis remained on high flow nasal cannula but decided he did not wish to continue on this, and has continued on nasal cannula 6 L.     9/29-patient lying in bed watching TV this morning. This on my evaluation patient would not be a candidate for inpatient hospice today. Discussed with nursing for team, wife states that they would not have funds for ECF placement at discharge. Social work and care facilitator working on placement for patient. Subjective: Patient states that he is doing much better today than he has been in the past.  He is having some shortness of breath but is very comfortable. Patient affirms that he does not want any extraordinary measures done, would like to remain DNR CC.        Medications:  Reviewed    Infusion Medications   Scheduled Medications    lidocaine  1 patch Transdermal Daily     PRN Meds: glycopyrrolate, morphine, LORazepam, acetaminophen **OR** acetaminophen, polyethylene glycol, promethazine **OR** ondansetron      Intake/Output Summary (Last 24 hours) at 9/29/2020 0755  Last data filed at 9/29/2020 0623  Gross per 24 hour   Intake 190 ml   Output 1050 ml   Net -860 ml       Diet:  DIET GENERAL;  Dietary Nutrition Supplements: Standard High Calorie Oral Supplement    Exam:  BP (!) 146/71   Pulse 83   Temp 97.5 °F (36.4 °C) (Oral)   Resp 18   Ht 6' (1.829 m)   Wt 154 lb 3.2 oz (69.9 kg)   SpO2 (!) 81%   BMI 20.91 kg/m²     General appearance: Acute on chronically ill, cachectic white male  HEENT: Pupils equal, round, and reactive to light. Conjunctivae/corneas clear. Neck: Supple, with full range of motion. No jugular venous distention. Trachea midline. Respiratory: Shallow breaths with decreased breath sounds  Cardiovascular: Regular rate and rhythm with normal S1/S2 without murmurs, rubs or gallops. Abdomen: Soft, non-tender, non-distended with normal bowel sounds. Musculoskeletal: passive and active ROM x 4 extremities. Generalized weakness  Skin: Skin color, texture, turgor normal.  No rashes or lesions. Neurologic:  Neurovascularly intact without any focal sensory/motor deficits. Cranial nerves: II-XII intact, grossly non-focal.  Psychiatric: Alert and oriented, thought content appropriate, normal insight  Capillary Refill: Brisk,< 3 seconds   Peripheral Pulses: +2 palpable, equal bilaterally       Labs:   No results for input(s): WBC, HGB, HCT, PLT in the last 72 hours. No results for input(s): NA, K, CL, CO2, BUN, CREATININE, CALCIUM, PHOS in the last 72 hours. Invalid input(s): MAGNES  No results for input(s): AST, ALT, BILIDIR, BILITOT, ALKPHOS in the last 72 hours. No results for input(s): INR in the last 72 hours. No results for input(s): Benny Altes in the last 72 hours.     Urinalysis:      Lab Results   Component Value Date    NITRU NEGATIVE 09/16/2020    WBCUA 2-4 09/16/2020    BACTERIA NONE SEEN 09/16/2020    RBCUA 3-5 09/16/2020    BLOODU NEGATIVE 09/16/2020    GLUCOSEU x-ray will be performed. **This report has been created using voice recognition software. It may contain minor errors which are inherent in voice recognition technology. **      Final report electronically signed by Dr. Yfn Contreras on 9/22/2020 11:02 AM      US THORACENTESIS WITH TUBE   Final Result      Successful ultrasound-guided thoracentesis with  0.55 L of fluid drained. **This report has been created using voice recognition software. It may contain minor errors which are inherent in voice recognition technology. **      Final report electronically signed by Dr. Yfn Contreras on 9/22/2020 11:25 AM      XR CHEST PORTABLE   Final Result   1. Almost complete opacification of the left hemithorax, likely secondary to large pleural effusion. 2. Small right-sided pleural effusion. 3. Extensive right lung atelectasis/infiltrate. **This report has been created using voice recognition software. It may contain minor errors which are inherent in voice recognition technology. **      Final report electronically signed by Dr. Yesi Tidwell on 9/22/2020 8:26 AM      VL DUP LOWER EXTREMITY VENOUS BILATERAL   Final Result      1. No sonographic evidence of lower extremity DVT. **This report has been created using voice recognition software. It may contain minor errors which are inherent in voice recognition technology. **      Final report electronically signed by Dr. Randa Vasquez on 9/21/2020 2:08 PM      XR CHEST PA INSPIRATION 1 VW   Final Result   No pneumothorax post left thoracentesis. **This report has been created using voice recognition software. It may contain minor errors which are inherent in voice recognition technology. **      Final report electronically signed by Dr. Yfn Contreras on 9/21/2020 1:50 PM      US THORACENTESIS   Final Result      Successful ultrasound-guided thoracentesis with  1.4 L of fluid drained.                **This report has been created using voice recognition software. It may contain minor errors which are inherent in voice recognition technology. **      Final report electronically signed by Dr. Yfn Contreras on 9/21/2020 12:33 PM      XR CHEST PORTABLE   Final Result   There is complete opacification of the left hemithorax which may be due to a large pleural effusion. Appearance of the chest is worse when compared to the previous exam.               **This report has been created using voice recognition software. It may contain minor errors which are inherent in voice recognition technology. **      Final report electronically signed by Dr Lisa Carranza on 9/21/2020 6:20 AM      XR CHEST PORTABLE   Final Result   Cardiomegaly and pulmonary vascular congestion with small bilateral pleural effusions. The appearance of the chest has slightly improved when compared to the previous study. **This report has been created using voice recognition software. It may contain minor errors which are inherent in voice recognition technology. **      Final report electronically signed by Dr Lisa Carranza on 9/20/2020 2:28 AM      XR ABDOMEN FOR NG/OG/NE TUBE PLACEMENT   Final Result   1. There is an enteric tube present coursing below the diaphragm, the distal portions of which are excluded from the current examination. However the distal side-port projects over the gastric body. **This report has been created using voice recognition software. It may contain minor errors which are inherent in voice recognition technology. **      Final report electronically signed by Dr. Randa Vasquez on 9/19/2020 6:56 PM      XR CHEST PORTABLE   Final Result   1. Significantly improved aeration of the left lung. However there is partial exclusion of the left lateral thorax and examination. 2. Interval placement of endotracheal tube with tip overlying the trachea approximately 7.1 cm above the malaika. There are 3.  Diffuse bilateral interstitial opacities are present overlying the visualized lung. This likely represents underlying pulmonary    edema. 4. Mild hazy opacity at the right lung base may indicate small underlying right-sided pleural fluid. **This report has been created using voice recognition software. It may contain minor errors which are inherent in voice recognition technology. **      Final report electronically signed by Dr. Rosmery Pearson on 9/19/2020 6:55 PM      XR CHEST PORTABLE   Final Result   Worsening appearance of the chest with now complete opacification of the left lung   Mucous plugging be a consideration. **This report has been created using voice recognition software. It may contain minor errors which are inherent in voice recognition technology. **      Final report electronically signed by Dr. Wilbert Berg on 9/19/2020 3:14 AM      XR CHEST PORTABLE   Final Result   1. Mild to moderate cardiomegaly. Moderate-sized pleural effusion left side, slightly improved from earlier. Tiny effusion right side. No pneumothorax seen, however. 2. Moderate pneumonia/pulmonary edema scattered diffusely in the right lung and likely diffusely in the left lung is well. 3. Overall appearance of chest not significantly changed from earlier with the exception of slightly small pleural effusion left side. **This report has been created using voice recognition software. It may contain minor errors which are inherent in voice recognition technology. **      Final report electronically signed by Dr. Mani Sofia on 9/18/2020 2:52 PM      XR CHEST 1 VIEW   Final Result   No pneumothorax post left thoracentesis. **This report has been created using voice recognition software. It may contain minor errors which are inherent in voice recognition technology. **      Final report electronically signed by Dr. Tj Maciel on 9/18/2020 1:51 PM      US THORACENTESIS   Final Result Successful ultrasound-guided thoracentesis with  1.4 L of fluid drained. Patient was specifically informed that his large left pleural effusion would likely have to be drained in two procedures. **This report has been created using voice recognition software. It may contain minor errors which are inherent in voice recognition technology. **      Final report electronically signed by Dr. Lurena Carrel on 9/18/2020 3:50 PM      US THORACENTESIS   Final Result      Successful ultrasound-guided thoracentesis with  0.92 L of fluid drained. **This report has been created using voice recognition software. It may contain minor errors which are inherent in voice recognition technology. **      Final report electronically signed by Dr. Lurena Carrel on 9/17/2020 4:32 PM      XR CHEST PA INSPIRATION 1 VW   Final Result   No pneumothorax post right thoracentesis. Note should be made that internal medicine was contacted and stated they definitely warranted a right thoracentesis to proceed a left thoracentesis. **This report has been created using voice recognition software. It may contain minor errors which are inherent in voice recognition technology. **      Final report electronically signed by Dr. Lurena Carrel on 9/17/2020 4:16 PM      CT CHEST WO CONTRAST   Final Result   Bilateral pleural effusions. Left upper and lower lobe consolidations. Nodular densities right lung. Cardiomegaly with small pericardial effusion. **This report has been created using voice recognition software. It may contain minor errors which are inherent in voice recognition technology. **      Final report electronically signed by Dr. Oscar Pritchard on 9/16/2020 11:13 PM      CT ABDOMEN PELVIS WO CONTRAST Additional Contrast? None   Final Result   1. Small amount of ascites in the pelvis   2. Hepatomegaly   3. Cholelithiasis   4. Bilateral nonobstructive nephrolithiasis.             **This

## 2020-09-29 NOTE — PLAN OF CARE
Problem: Falls - Risk of:  Goal: Will remain free from falls  Description: Will remain free from falls  Outcome: Ongoing  Note: No falls noted this shift. Fall risk assessment completed. Hourly rounding performed. Bed locked in lowest position, bed alarm on, call light and personal items within reach, and fall sign posted. Patient has indwelling capone catheter for comfort. Problem: Skin Integrity:  Goal: Will show no infection signs and symptoms  Description: Will show no infection signs and symptoms  Outcome: Ongoing  Note: . No new skin lesions noted this shift. Patient encouraged to reposition every two hours. Nursing staff assist with patient repositioning every two hours. Skin assessments completed and ongoing. Problem: Respiratory  Goal: O2 Sat > 90%  Outcome: Ongoing  Note: Patient admitted with bilateral plueral effusions and diagnosed with stage 1 lung adenocarcinoma. Patient had pleurx drain removed. On 6L nasal cannula with O2 saturation at 81%. Lung sounds clear and diminished. Will continue to assess. Problem: GI  Goal: No bowel complications  Outcome: Ongoing  Note: Bowel sounds hypoactive. No BM throughout the night. Problem:   Goal: Adequate urinary output  Outcome: Ongoing  Note: Capone catheter placed for comfort. Problem: Nutrition  Goal: Optimal nutrition therapy  Outcome: Ongoing  Note: Patient on a general diet and tolerating well. Denies any nausea or vomiting. Will continue to assess. Encouragement of intake. Problem: Pain:  Goal: Pain level will decrease  Description: Pain level will decrease  Outcome: Ongoing  Note: Pain Assessment: 0-10  Pain Level: 0  Patient's Stated Pain Goal: No pain   Is pain goal met at this time? Yes     Non-Pharmaceutical Pain Intervention(s): Rest, Repositioned, PRN morphine administered per MAR. Care plan reviewed with patient. Patient verbalize understanding of the plan of care and contribute to goal setting.

## 2020-09-29 NOTE — PROGRESS NOTES
Visit made to assess patient symptoms and appropriate level of care for hospice. Patient with minimal use of comfort medication in last 24hrs. Alert in bed, denied need for comfort medication. Denied pain and shortness of breath at this time. Assisted primary nurse Gustavo Berman RN with turning of patient for application of pain patch. Patient assisted with turn. No noted s/s of distress or discomfort. Hospice will continue to follow along as patient DOES NOT meet for inpatient hospice- need placement as no one able to provide care for patient in home. Updated SW and primary nurse that patient not meeting for GIP hospice care.

## 2020-09-29 NOTE — FLOWSHEET NOTE
[facility]  PHYSICAL THERAPY MISSED TREATMENT NOTE  STR ONC MED 5K    Date: 2020  Patient Name: Rajesh Wood        MRN: [id number]   : 1940  (68 y.o.)  Gender: male   Referring Practitioner: Dr. Aydin Fall  Diagnosis: pleural effusion         REASON FOR MISSED TREATMENT:  Pt has decided on hospice care, will discharge from PT services at this time.            Jacque Sherwood PTA [id number]

## 2020-09-29 NOTE — PLAN OF CARE
Problem: Falls - Risk of:  Goal: Will remain free from falls  Description: Will remain free from falls  9/29/2020 1739 by Joe Aburto RN  Outcome: Met This Shift  Fall assessment completed. Patient using call light appropriately to call for assistance with ambulation to bathroom. Personal items within reach. Patient is also compliant with use of non-skid slippers. Problem: Skin Integrity:  Goal: Will show no infection signs and symptoms  Description: Will show no infection signs and symptoms  9/29/2020 1739 by Joe Aburto RN  Outcome: Met This Shift  No skin breakdown this shift. Patient being assisted with turning. Patients states understanding of repositioning every two hours. Problem: Respiratory  Goal: O2 Sat > 90%  9/29/2020 1739 by Joe Aburto RN  Outcome: Ongoing  Patients oxygen saturation 88-93 % on 6 L02 per Nasal Canula. No shortness of breath noted. Lung sounds dim, able C&DB as ordered. Problem: GI  Goal: No bowel complications  2/11/6240 1739 by Joe Aburto RN  Outcome: Ongoing  Patient bowel sounds active. Passing flatus. Pt taking prescribed medication to assist with BM. Problem:   Goal: Adequate urinary output  9/29/2020 1739 by Joe Aburto RN  Outcome: Ongoing  Patient has capone cath in place for comfort care. Problem: Nutrition  Goal: Optimal nutrition therapy  9/29/2020 1739 by Joe Aburto RN  Outcome: Ongoing  Patients appetite good. Continuing to encourage fluids. Problem: Pain:  Goal: Pain level will decrease  Description: Pain level will decrease  9/29/2020 1739 by Joe Aburto RN  Outcome: Ongoing  Patient states pain relief from PRN pain medications. Pain reassessed one hour post PRN pain medication given. Patient rates pain 7 on NOREEN 0-10 scale.       Problem: Pain:  Goal: Control of acute pain  Description: Control of acute pain  Outcome: Ongoing  Patient taking morphine PRN for acute pain control. Care plan reviewed with patient and family. Patient and family verbalize understanding of the plan of care and contribute to goal setting.

## 2020-09-29 NOTE — CARE COORDINATION
9/29/20, 11:21 AM EDT    DISCHARGE PLANNING EVALUATION    10:08 am-[facility] [address] [facility]-patient is still not meeting criteria for GIP hospice. 4:03 PM Call to [facility] messages for Nallely Oshea and Susanne Costa with hospice program to contact SHERYL re: potential referral.     4:22 PM Received call from Riky Holly-verified that patient is service connected. They shelley need chart information faxed to them for review to determine if he is appropriate for their hospice unit. He did advise that patient could go to a local ECF with hospice care and still have VA coverage for long term care. SHERYL will speak with POA and see which direction she would like to pursue. If ECF- is [facility] coordinator. Call to ex-wife Carlos-explained the above to her. She would prefer that patient go to a nursing home that is closer vs [facility]. Reviewed list of contracted facilities-first choice is [facility] followed by [facility] and [place]. SHERYL advised her that referral would be made and that they would need to verify coverage. She stated her understanding. Call to Riky Yen with [facility] with referral information. They will need to verify if VA will cover for hospice care. Staff will get back with SHERYL.

## 2020-09-30 PROCEDURE — 99231 SBSQ HOSP IP/OBS SF/LOW 25: CPT | Performed by: FAMILY MEDICINE

## 2020-09-30 PROCEDURE — 1200000003 HC TELEMETRY R&B

## 2020-09-30 PROCEDURE — 6370000000 HC RX 637 (ALT 250 FOR IP): Performed by: STUDENT IN AN ORGANIZED HEALTH CARE EDUCATION/TRAINING PROGRAM

## 2020-09-30 PROCEDURE — 6360000002 HC RX W HCPCS: Performed by: INTERNAL MEDICINE

## 2020-09-30 RX ADMIN — POLYETHYLENE GLYCOL 3350 17 G: 17 POWDER, FOR SOLUTION ORAL at 09:24

## 2020-09-30 RX ADMIN — PREDNISONE 40 MG: 20 TABLET ORAL at 08:51

## 2020-09-30 RX ADMIN — MORPHINE SULFATE 1 MG: 2 INJECTION, SOLUTION INTRAMUSCULAR; INTRAVENOUS at 13:40

## 2020-09-30 ASSESSMENT — PAIN SCALES - GENERAL
PAINLEVEL_OUTOF10: 0

## 2020-09-30 NOTE — PROGRESS NOTES
Comprehensive Nutrition Assessment    Type and Reason for Visit:  Reassess    Nutrition Recommendations/Plan: Continue current diet and ONS as ordered. Nutrition Assessment:    Pt improving from a nutritional standpoint AEB acceptance of ONS. Remains at risk for further nutritional compromise r/t moderate malnutrition, increased nutrient needs for wound healing and underlying medical condition (hx lung cancer, COPD). Nutrition recommendations/interventions as per above. Malnutrition Assessment:  Malnutrition Status: Moderate malnutrition    Context:  Acute Illness     Findings of the 6 clinical characteristics of malnutrition:  Energy Intake:  Mild decrease in energy intake (Comment)(skips atleast 1 meal/day per diet hx)  Weight Loss:  (16# wt loss in 1 year per pt,)     Body Fat Loss:  1 - Mild body fat loss Orbital   Muscle Mass Loss:  1 - Mild muscle mass loss Temples (temporalis)  Fluid Accumulation:  Unable to assess     Strength:  Not Performed    Estimated Daily Nutrient Needs:  Energy (kcal):  ~2250 kcals (30/kgm); Weight Used for Energy Requirements:  ((9/18) 75kgm)     Protein (g):  90 grams (1.2 grams protein/kgm);  Weight Used for Protein Requirements:  (75kgm (9/18))               Nutrition Related Findings:  Patient seen- sleepy, reports \"eating some\" and acceptance of ensure supplement; intake 1-25% of meals so far today, BM x 1 today; deltasone, glycolax; note comfort care code status      Wounds:  Stage I(posterior buttocks)       Current Nutrition Therapies:    DIET GENERAL;  Dietary Nutrition Supplements: Standard High Calorie Oral Supplement    Anthropometric Measures:  · Height: 6' (182.9 cm)  · Current Body Weight: 154 lb 3.2 oz (69.9 kg)(9/24, trace edema)   · Admission Body Weight: 164 lb 14.5 oz (74.8 kg)((9/17) + 1 RLE & LLE edema)    · Usual Body Weight: (162# 3.2oz (8/3/20) per EMR)     · Ideal Body Weight: 178 lbs;      · BMI: 20.9      · BMI Categories: Underweight (BMI less than 25) age over 72       Nutrition Diagnosis:   · Moderate malnutrition related to catabolic illness as evidenced by mild loss of subcutaneous fat, mild muscle loss(diet hx skips atleast 1 meal/day)      Nutrition Interventions:   Food and/or Nutrient Delivery:  Continue Current Diet, Continue Oral Nutrition Supplement  Nutrition Education/Counseling:  Education initiated(9/21 Encouraged po, ONS intake at best efforts.)   Coordination of Nutrition Care:  Continued Inpatient Monitoring    Goals:  Pt. will tolerate and consume 75% or more of meals to promote wound healing during LOS. Nutrition Monitoring and Evaluation:      Food/Nutrient Intake Outcomes:  Food and Nutrient Intake, Supplement Intake  Physical Signs/Symptoms Outcomes:  Biochemical Data, Chewing or Swallowing, GI Status, Fluid Status or Edema, Nutrition Focused Physical Findings, Skin, Weight     Discharge Planning:     Too soon to determine     Electronically signed by Maikol Guillaume RD, LD on 9/30/20 at 3:06 PM EDT    Contact: [phone]

## 2020-09-30 NOTE — CARE COORDINATION
9/30/20, 11:43 AM EDT    DISCHARGE PLANNING EVALUATION    Spoke with Iron at [facility] and she stated that facility was reviewing and working with the [place] to verify benefits. Will await return call if they can accept patient.

## 2020-09-30 NOTE — CARE COORDINATION
9/30/20, 2:17 PM EDT    DISCHARGE ON GOING [address] day: 14  Location: 5-20/020-A Reason for admit: Pleural effusion [J90]   Treatment Plan of Care: Patient transferred from  to [place] [address]. Hospice following, [facility] following for placement. Patient does not meet GIP. His wife can't care for him at home. Barriers to Discharge: needs hospice placement. PCP: Brock Bowens DO  Readmission Risk Score: 16%  Patient Goals/Plan/Treatment Preferences: [facility] following for Hospice placement.

## 2020-09-30 NOTE — PLAN OF CARE
Problem: Falls - Risk of:  Goal: Will remain free from falls  Description: Will remain free from falls  Outcome: Ongoing  Note: No falls noted this shift. Fall risk assessment completed. Hourly rounding performed. Bed locked in lowest position, bed alarm on, call light and personal items within reach, and fall sign posted. Patient has indwelling capone catheter for comfort. Problem: Skin Integrity:  Goal: Will show no infection signs and symptoms  Description: Will show no infection signs and symptoms  Outcome: Ongoing  Note: No new skin lesions noted this shift. Patient encouraged to reposition every two hours. Patient repositioning self every two hours in bed. Skin assessments completed and ongoing. Problem: Respiratory  Goal: O2 Sat > 90%  Outcome: Ongoing  Note: Patient admitted with bilateral plueral effusions and diagnosed with stage 1 lung adenocarcinoma. Patient had pleurx drain removed. On 6L nasal cannula with O2 saturation at 81%. Lung sounds clear and diminished. Will continue to assess. Problem: GI  Goal: No bowel complications  Outcome: Ongoing  Note: Bowel sounds hypoactive. No BM throughout the night. Problem:   Goal: Adequate urinary output  Outcome: Ongoing  Note: Capone catheter placed for comfort. Problem: Nutrition  Goal: Optimal nutrition therapy  Outcome: Ongoing  Note: Patient on a general diet and tolerating well. Denies any nausea or vomiting. Will continue to assess. Encouragement of intake. Problem: Pain:  Goal: Pain level will decrease  Description: Pain level will decrease  Outcome: Ongoing  Note: Pain Assessment: 0-10  Pain Level: 0  Patient's Stated Pain Goal: No pain   Is pain goal met at this time? Yes     Non-Pharmaceutical Pain Intervention(s): Rest, Repositioned, PRN morphine administered per MAR. Care plan reviewed with patient. Patient verbalize understanding of the plan of care and contribute to goal setting.     Electronically signed by

## 2020-09-30 NOTE — CARE COORDINATION
Patient is no longer a [employer] per [employer].  Electronically signed by Guille Peters RN on 9/30/20 at 9:29 AM EDT

## 2020-09-30 NOTE — PLAN OF CARE
Problem: Falls - Risk of:  Goal: Will remain free from falls  Description: Will remain free from falls  Outcome: Met This Shift  Fall assessment completed. Patient using call light appropriately to call for assistance with ambulation to bathroom. Personal items within reach. Patient is also compliant with use of non-skid slippers. Problem: Skin Integrity:  Goal: Will show no infection signs and symptoms  Description: Will show no infection signs and symptoms  Outcome: Ongoing  No skin breakdown this shift. Patient being assisted with turning. Patients states understanding of repositioning every two hours. Problem: Respiratory  Goal: O2 Sat > 90%  Outcome: Ongoing  Patients oxygen saturation 87 % on 6 L 02 per Nasal [place]. No shortness of breath noted. Lung sounds  dim, able C&DB as ordered. Problem: GI  Goal: No bowel complications  Outcome: Met This Shift  Patient bowel sounds active. Passing flatus. Pt taking prescribed medication to assist with BM. Problem:   Goal: Adequate urinary output  Outcome: Met This Shift  Patient has capone cath in place. Urine yellow clear. Problem: Nutrition  Goal: Optimal nutrition therapy  9/30/2020 1942 by Jayshree Acosta RN  Outcome: Met This Shift  Patients appetite good. Continuing to encourage fluids. Problem: Musculor/Skeletal Functional Status  Goal: Highest potential functional level  Outcome: Ongoing   Patient ambulates with 2 assist. Gait unsteady. Patient able to perform passive ROM. Problem: Pain:  Goal: Pain level will decrease  Description: Pain level will decrease  Outcome: Ongoing  Patient states pain relief from PRN pain medications. Pain reassessed one hour post PRN pain medication given. Patient rates pain 8 on NOREEN 0-10 scale. Problem: Pain:  Goal: Control of acute pain  Description: Control of acute pain  Outcome: Ongoing  Patient taking Morphine PRN for pain control.     Care plan reviewed with patient and family. Patient and family verbalize understanding of the plan of care and contribute to goal setting.

## 2020-09-30 NOTE — PROGRESS NOTES
Hospice visit to check on plan of care and assist as needed. Patient requiring minimal usage of comfort medication at this time and DOES NOT meet for inpatient hospice care. Discussed with primary nurse Jerry Quigley RN, voiced patient has been comfortable and denied needs for patient. Talked with Juanita Irby and awaiting to hear back about whether [facility] patient E[facility]. Asked to update this nurse should plan of care be decided on. Call placed to ex wife/POA Janneth Araujo to discuss hospice remains available and should wish for service on discharge. Voiced that she feels appropriate with patient not wishing for treatment and voiced that she wished she could take care of him but just unable. Amazed that patient doing okay, after weekend when had appeared to be dying. Discussed with her that never sure how things may go. Much support given. Very thankful for all of [facility] staff and care that Eyad Hardin has received. Believes that will wish for [facility] hospice when plan of discharge in place. When plan in place this nurse will discuss again with Eyad Hardin if alert(which he has been, most of the time).  Resting peacefully at this time and did not awake

## 2020-09-30 NOTE — PROGRESS NOTES
ongoing fatigue.  He was started on oxygen in the ER. Oly Bailey had a left thoracentesis on 9/18 as well as a right thoracentesis on 9/17, however developed progressive hypoxia requiring high flow nasal cannula thus pulmonary was consulted and patient had bronchoscopy on 9/19 which showed a mucous plug in the left mainstem bronchus.  The patient did develop recurrence of the left-sided pleural effusion and ultimately had pigtail catheter placed on 9/22.  The patient was scheduled for a bronchoscopy on 9/23, however after further discussion with pulmonary the patient declined further invasive evaluation and opted for comfort measures.  Hospice was consulted on 9/23. Kellie Durán remained on high flow nasal cannula but decided he did not wish to continue on this, and has continued on nasal cannula 6 L. For further details and updates please refer to assessment and plan at the beginning of the note. ROS (10 point review of systems completed. Pertinent positives noted. Otherwise ROS is negative) : Cachectic  PMH:  Per HPI and reviewed in the chart  SHX: Reviewed in the chart, also the patient  [id number] [facility],[address]: Reviewed in the chart, also with the patient  Allergies: Reviewed in the chart  Medications:       predniSONE  40 mg Oral Daily    lidocaine  1 patch Transdermal Daily   Subjective 9/30/2020: Hospice follow-up on the patient, he is doing well today! .  Still awaiting for [facility] approval.  Nursing notes, vitals, hospice notes reviewed. Vital Signs:   Vitals reviewed and compared with the previous ones  /63   Pulse 83   Temp 96.3 °F (35.7 °C) (Oral)   Resp 20   Ht 6' (1.829 m)   Wt 154 lb 3.2 oz (69.9 kg)   SpO2 92%   BMI 20.91 kg/m²      Intake/Output Summary (Last 24 hours) at 9/30/2020 1058  Last data filed at 9/30/2020 1028  Gross per 24 hour   Intake 740 ml   Output 750 ml   Net -10 ml        General:   Cachectic, chronically ill  HEENT:  normocephalic and atraumatic. No scleral icterus. PERRLA. Neck: supple.  No RBCUA 3-5 09/16/2020    BLOODU NEGATIVE 09/16/2020    GLUCOSEU NEGATIVE 09/16/2020       Radiology:  XR CHEST PORTABLE   Final Result   Exam Date and Exam Time: 09/24/2020 03:35 AM      Accession: [id number]      Reason for exam: left pleural effusion w/left pigtail      Ordering Diagnosis: Pleural effusion      Impression: 9/23/20. Findings:      Marked improved aeration left lung with patchy ill-defined opacities in    the left mid and lower lung zones. Considering the slight differences in    technique and positioning the overall appearance of the right upper lobe    ill-defined consolidation has mildly increased particularly in the lower    distribution. Ill-defined opacity in the right lower lung zone is    relatively unchanged. Cardiomegaly. Left thoracostomy tube terminates    within the left infrahilar region. IMPRESSION:      Marked reduction of left pleural effusion with improved aeration left    lung. Mild increased ill-defined opacification in the right upper lobe most    consolidated at the lower distribution. Bibasilar ill-defined opacities favoring layering pleural effusions with    atelectasis and/or pneumonia. This document has been electronically signed by: Elsa Don. Dolly Welch on    09/24/2020 07:17 AM         XR CHEST PORTABLE   Final Result   Left pleural catheter with persistent opacification of the left    hemithorax. No pneumothorax. This document has been electronically signed by: Kye Schwartz MD on    09/23/2020 02:58 AM         XR CHEST PORTABLE   Final Result   It is unclear whether the small left apical pneumothorax persists, though it certainly has not enlarged since previous. **This report has been created using voice recognition software. It may contain minor errors which are inherent in voice recognition technology. **      Final report electronically signed by Dr. Abel Perea on 9/22/2020 3:47 PM      XR CHEST PORTABLE   Final Result   A small left pneumothorax is seen. A short-term follow-up chest x-ray will be performed. **This report has been created using voice recognition software. It may contain minor errors which are inherent in voice recognition technology. **      Final report electronically signed by Dr. Renée Ta on 9/22/2020 11:02 AM      US THORACENTESIS WITH TUBE   Final Result      Successful ultrasound-guided thoracentesis with  0.55 L of fluid drained. **This report has been created using voice recognition software. It may contain minor errors which are inherent in voice recognition technology. **      Final report electronically signed by Dr. Renée aT on 9/22/2020 11:25 AM      XR CHEST PORTABLE   Final Result   1. Almost complete opacification of the left hemithorax, likely secondary to large pleural effusion. 2. Small right-sided pleural effusion. 3. Extensive right lung atelectasis/infiltrate. **This report has been created using voice recognition software. It may contain minor errors which are inherent in voice recognition technology. **      Final report electronically signed by Dr. Nakul Haney on 9/22/2020 8:26 AM      VL DUP LOWER EXTREMITY VENOUS BILATERAL   Final Result      1. No sonographic evidence of lower extremity DVT. **This report has been created using voice recognition software. It may contain minor errors which are inherent in voice recognition technology. **      Final report electronically signed by Dr. Gabe Vann on 9/21/2020 2:08 PM      XR CHEST PA INSPIRATION 1 VW   Final Result   No pneumothorax post left thoracentesis. **This report has been created using voice recognition software. It may contain minor errors which are inherent in voice recognition technology. **      Final report electronically signed by Dr. Renée Ta on 9/21/2020 1:50 PM      US THORACENTESIS   Final Result      Successful ultrasound-guided thoracentesis with  1.4 L of fluid drained. **This report has been created using voice recognition software. It may contain minor errors which are inherent in voice recognition technology. **      Final report electronically signed by Dr. Abel Perea on 9/21/2020 12:33 PM      XR CHEST PORTABLE   Final Result   There is complete opacification of the left hemithorax which may be due to a large pleural effusion. Appearance of the chest is worse when compared to the previous exam.               **This report has been created using voice recognition software. It may contain minor errors which are inherent in voice recognition technology. **      Final report electronically signed by Dr Abdiel Bull on 9/21/2020 6:20 AM      XR CHEST PORTABLE   Final Result   Cardiomegaly and pulmonary vascular congestion with small bilateral pleural effusions. The appearance of the chest has slightly improved when compared to the previous study. **This report has been created using voice recognition software. It may contain minor errors which are inherent in voice recognition technology. **      Final report electronically signed by Dr Abdiel Bull on 9/20/2020 2:28 AM      XR ABDOMEN FOR NG/OG/NE TUBE PLACEMENT   Final Result   1. There is an enteric tube present coursing below the diaphragm, the distal portions of which are excluded from the current examination. However the distal side-port projects over the gastric body. **This report has been created using voice recognition software. It may contain minor errors which are inherent in voice recognition technology. **      Final report electronically signed by Dr. Janae Chen on 9/19/2020 6:56 PM      XR CHEST PORTABLE   Final Result   1. Significantly improved aeration of the left lung. However there is partial exclusion of the left lateral thorax and examination.    2. Interval placement of endotracheal tube with tip overlying the trachea approximately 7.1 cm above the [place]. There are 3. Diffuse bilateral interstitial opacities are present overlying the visualized lung. This likely represents underlying pulmonary    edema. 4. Mild hazy opacity at the right lung base may indicate small underlying right-sided pleural fluid. **This report has been created using voice recognition software. It may contain minor errors which are inherent in voice recognition technology. **      Final report electronically signed by Dr. Mk Bose on 9/19/2020 6:55 PM      XR CHEST PORTABLE   Final Result   Worsening appearance of the chest with now complete opacification of the left lung   Mucous plugging be a consideration. **This report has been created using voice recognition software. It may contain minor errors which are inherent in voice recognition technology. **      Final report electronically signed by Dr. Demetrice Sands on 9/19/2020 3:14 AM      XR CHEST PORTABLE   Final Result   1. Mild to moderate cardiomegaly. Moderate-sized pleural effusion left side, slightly improved from earlier. Tiny effusion right side. No pneumothorax seen, however. 2. Moderate pneumonia/pulmonary edema scattered diffusely in the right lung and likely diffusely in the left lung is well. 3. Overall appearance of chest not significantly changed from earlier with the exception of slightly small pleural effusion left side. **This report has been created using voice recognition software. It may contain minor errors which are inherent in voice recognition technology. **      Final report electronically signed by Dr. Keny Okeefe on 9/18/2020 2:52 PM      XR CHEST 1 VIEW   Final Result   No pneumothorax post left thoracentesis. **This report has been created using voice recognition software. It may contain minor errors which are inherent in voice recognition technology. **      Final report electronically signed by Dr. Ness Melissa on 9/18/2020 1:51 PM      US THORACENTESIS   Final Result      Successful ultrasound-guided thoracentesis with  1.4 L of fluid drained. Patient was specifically informed that his large left pleural effusion would likely have to be drained in two procedures. **This report has been created using voice recognition software. It may contain minor errors which are inherent in voice recognition technology. **      Final report electronically signed by Dr. Rigo Licona on 9/18/2020 3:50 PM      US THORACENTESIS   Final Result      Successful ultrasound-guided thoracentesis with  0.92 L of fluid drained. **This report has been created using voice recognition software. It may contain minor errors which are inherent in voice recognition technology. **      Final report electronically signed by Dr. Rigo Licona on 9/17/2020 4:32 PM      XR CHEST PA INSPIRATION 1 VW   Final Result   No pneumothorax post right thoracentesis. Note should be made that internal medicine was contacted and stated they definitely warranted a right thoracentesis to proceed a left thoracentesis. **This report has been created using voice recognition software. It may contain minor errors which are inherent in voice recognition technology. **      Final report electronically signed by Dr. Rigo Licona on 9/17/2020 4:16 PM      CT CHEST WO CONTRAST   Final Result   Bilateral pleural effusions. Left upper and lower lobe consolidations. Nodular densities right lung. Cardiomegaly with small pericardial effusion. **This report has been created using voice recognition software. It may contain minor errors which are inherent in voice recognition technology. **      Final report electronically signed by Dr. Lisa Carvalho on 9/16/2020 11:13 PM      CT ABDOMEN PELVIS WO CONTRAST Additional Contrast? None   Final Result   1. Small amount of ascites in the pelvis   2. Hepatomegaly   3. Cholelithiasis   4. Bilateral nonobstructive nephrolithiasis. **This report has been created using voice recognition software. It may contain minor errors which are inherent in voice recognition technology. **      Final report electronically signed by Dr. Imani Neri on 9/16/2020 11:16 PM      XR CHEST PORTABLE   Final Result   Congestive failure with complete opacification of the left lower lobe likely due to combination of consolidation and pleural fluid. Probable posteriorly layered right effusion versus developing infiltrate            **This report has been created using voice recognition software. It may contain minor errors which are inherent in voice recognition technology. **      Final report electronically signed by Dr. Imani Neri on 9/16/2020 10:33 PM        Ct Abdomen Pelvis Wo Contrast Additional Contrast? None    Result Date: 9/16/2020  PROCEDURE: CT ABDOMEN PELVIS WO CONTRAST CLINICAL INFORMATION: Abdominal pain recently diagnosed with lung cancer. . COMPARISON: No prior study. TECHNIQUE: 2-D multiplanar noncontrast images of the abdomen and pelvis All CT scans at this facility use dose modulation, iterative reconstruction, and/or weight-based dosing when appropriate to reduce radiation dose to as low as reasonably achievable. FINDINGS: Limitations: Solid organ or hollow viscera evaluation limited without contrast. Lung bases Please see the same-day dedicated exam Abdomen pelvis Hepatomegaly. Distention of the IVC and hepatic veins may indicate right heart failure. Spleen is normal. Fullness of the left adrenal gland could be related to metastatic disease. Right adrenal is not definitively identified. Nonobstructing right intrarenal calculi. Nonobstructing left intrarenal calculi. Gallstones. Pancreas is atrophic. Aorta is atherosclerotic. Pelvis There is ascites within the pelvis. Urinary bladder is unremarkable. There is no bowel obstruction. Degradation of images by right hip replacement.  Subcutaneous edema throughout the abdomen and pelvis which is greater on the left suggesting third spacing. No suspicious bone lesions     1. Small amount of ascites in the pelvis 2. Hepatomegaly 3. Cholelithiasis 4. Bilateral nonobstructive nephrolithiasis. **This report has been created using voice recognition software. It may contain minor errors which are inherent in voice recognition technology. ** Final report electronically signed by Dr. Demetrice Sands on 9/16/2020 11:16 PM    Ct Chest Wo Contrast    Result Date: 9/16/2020  PROCEDURE: CT CHEST WO CONTRAST CLINICAL INFORMATION: Hypoxia shortness of breath, recently diagnosed with bilateral pleural effusions and lung cancer. . COMPARISON: No prior study. TECHNIQUE: 2-D multiplanar noncontrast images of the chest All CT scans at this facility use dose modulation, iterative reconstruction, and/or weight-based dosing when appropriate to reduce radiation dose to as low as reasonably achievable. FINDINGS: Large bilateral pleural effusions left greater than right. Left lower lobe compressive atelectasis or consolidation. Left upper lobe consolidation at the major fissure. Centrilobular emphysematous changes. Spiculated nodular density posterior right upper lung image 18 series 2 measuring 10 mm. Nodule versus focal atelectasis posterior right upper lung 6 mm. Image 31 Left hilar masslike consolidation. A shallow adenopathy with a single 10 mm short axis pretracheal node and a 1.3 cm subcarinal node. Aorta is moderately atherosclerotic with calcific atherosclerosis. Heart size is enlarged. Minimal apical pericardial effusion 1 cm. No suspicious bone lesions. ABDOMEN: Please see the same-day dedicated exam     Bilateral pleural effusions. Left upper and lower lobe consolidations. Nodular densities right lung. Cardiomegaly with small pericardial effusion. **This report has been created using voice recognition software.   It may contain minor errors which are inherent in voice recognition technology. ** Final report electronically signed by Dr. Blanca Grove on 9/16/2020 11:13 PM    Xr Chest Portable    Result Date: 9/16/2020  PROCEDURE: XR CHEST PORTABLE CLINICAL INFORMATION: Fatigue cough shortness of breath . COMPARISON: 8/25/2020 TECHNIQUE: Portable upright FINDINGS: COMPLETE opacification of the left lower lobe airspace consolidation and pleural fluid. Cardiomegaly. Pulmonary vascular congestion. Haziness right lower lobe probably posterior pleural fluid. EKG leads overlie the chest.     Congestive failure with complete opacification of the left lower lobe likely due to combination of consolidation and pleural fluid. Probable posteriorly layered right effusion versus developing infiltrate **This report has been created using voice recognition software. It may contain minor errors which are inherent in voice recognition technology. ** Final report electronically signed by Dr. Blanca Grove on 9/16/2020 10:33 PM    Us Thoracentesis    Result Date: 9/17/2020  PROCEDURE: US THORACENTESIS CLINICAL INFORMATION: Right Lung. Pt with pleural effusion with lung cancer, need assistance with tap. Thank you! . COMPARISON: CT 9/16/2020 PROCEDURE: Clinical team was contacted and they specifically stated that he wanted the right sided procedure to precede the left. The benefits and the risk of the procedure were explained to the patient. The patient was given an opportunity to ask questions. Signed consent was obtained. Limited ultrasound exam of the right chest showed moderate amount of pleural fluid. Using ultrasound guidance, a site was marked on the skin. The right side of the posterior chest was prepped and draped using the usual sterile technique and the skin was anesthetized with 2 percent lidocaine. A 5 Uzbek one-step catheter was introduced to the right pleural space. 0.92 L of clear yellow fluid drained. The catheter was then removed.  The patient tolerated the procedure well with no complications. Specimen sent for laboratory studies as requested. Estimated blood loss is 0 cc. Patient left the department in good condition. Successful ultrasound-guided thoracentesis with  0.92 L of fluid drained. **This report has been created using voice recognition software. It may contain minor errors which are inherent in voice recognition technology. ** Final report electronically signed by Dr. Jose Hamilton on 9/17/2020 4:32 PM    Xr Chest Pa Inspiration 1 Vw    Result Date: 9/17/2020  PROCEDURE: XR CHEST PA INSPIRATION 1 VW CLINICAL INFORMATION: post right thoracentesis. COMPARISON: 9/16/2020 TECHNIQUE: AP upright view of the chest. FINDINGS: Right pleural fluid has been evacuated. No pneumothorax. There is complete white out of the left hemithorax, significantly worsened from previous. There is favored to relate to pleural fluid as there is mild mediastinal shift toward the right, as atelectasis or shift toward the left. No pneumothorax post right thoracentesis. Note should be made that internal medicine was contacted and stated they definitely warranted a right thoracentesis to proceed a left thoracentesis. **This report has been created using voice recognition software. It may contain minor errors which are inherent in voice recognition technology. ** Final report electronically signed by Dr. Jose Hamilton on 9/17/2020 4:16 PM      Discussed plan with patient and nurse. Patient and nurse verbalized understanding and agree. All questions addressed with concerns. Please excuse my TYPOS!     Electronically signed by Dar Forrester MD on 9/30/2020 at 10:58 AM

## 2020-09-30 NOTE — PLAN OF CARE
Problem: Nutrition  Goal: Optimal nutrition therapy  Outcome: Ongoing   Nutrition Problem #1: Moderate malnutrition  Intervention: Food and/or Nutrient Delivery: Continue Current Diet, Continue Oral Nutrition Supplement  Nutritional Goals: Pt. will tolerate and consume 75% or more of meals to promote wound healing during LOS.

## 2020-10-01 VITALS
HEART RATE: 77 BPM | TEMPERATURE: 97 F | HEIGHT: 72 IN | WEIGHT: 154.2 LBS | DIASTOLIC BLOOD PRESSURE: 67 MMHG | BODY MASS INDEX: 20.88 KG/M2 | OXYGEN SATURATION: 86 % | RESPIRATION RATE: 16 BRPM | SYSTOLIC BLOOD PRESSURE: 134 MMHG

## 2020-10-01 PROCEDURE — 99239 HOSP IP/OBS DSCHRG MGMT >30: CPT | Performed by: INTERNAL MEDICINE

## 2020-10-01 PROCEDURE — 6370000000 HC RX 637 (ALT 250 FOR IP): Performed by: STUDENT IN AN ORGANIZED HEALTH CARE EDUCATION/TRAINING PROGRAM

## 2020-10-01 RX ORDER — PREDNISONE 20 MG/1
40 TABLET ORAL DAILY
Qty: 10 TABLET | Refills: 0 | Status: SHIPPED | OUTPATIENT
Start: 2020-10-02 | End: 2020-10-07

## 2020-10-01 RX ADMIN — PREDNISONE 40 MG: 20 TABLET ORAL at 09:01

## 2020-10-01 NOTE — CARE COORDINATION
10/1/20, 10:20 AM EDT    DISCHARGE PLANNING EVALUATION    9:52 am: Received call from [facility] with Jeevan MILLIGAN-she has spoken with ex-wife/POA Diego Canales and she would like for patient to be admitted to their in-patient hospice unit. They can accept today-plan is for patient to be a direct admit to the acute care unit ([facility]-3rd floor of [facility]) where they will swab him for COVID and will then transfer to hospice unit when results are received. SW asked about COVID test being completed at [facility] declined stating that they would still have to do one when he arrives. SW to contact with her with transport time. Number obtained for RN to RN report and fax number with orders. ROSIE oPwell updated. JUSTIN Rice updated and has notified Dr Dimple Gowers via perfect serve. Call to [facility] and transport arranged for 1 pm.     Call to ex-wife/POA-left message for her to contact SW. Call to [facility] with HCF-and advised her of change in discharge plans. 11:06 AM Call to ex wife Carlos-confirmed with her plan for patient to be admitted to [facility] in-patient hospice. SW advised her that patient will be transferred today at 1 pm-she plans to be here before he leaves. SHERYL asked her about status of POA-she consulted an  and was told that since she has medical DPOA, she didn't need POA for financial.     Transport forms faxed to [address] [place] [address]. AVS, last dose MAR and DNR form faxed to [facility] attention to [facility]. Call to [facility] with update on the above. Number for report provided to ROSIE Powell. No other needs at this time. Update: spoke with Diego [facility] and patient-patient updated on plans, questions answered-he is agreeable with plan. 10/1/20, 11:54 AM EDT    Patient goals/plan/ treatment preferences discussed by  and . Patient goals/plan/ treatment preferences reviewed with patient/ family.   Patient/ family verbalize understanding of discharge plan and are in agreement with goal/plan/treatment preferences. Understanding was demonstrated using the teach back method. AVS provided by RN at time of discharge, which includes all necessary medical information pertaining to the patients current course of illness, treatment, post-discharge goals of care, and treatment preferences. Services After Discharge  Services At/After Discharge:  In ambulance, Hospice, Transport([facility])

## 2020-10-01 NOTE — DISCHARGE INSTR - COC
Continuity of Care Form    Patient Name: Ahsan Phillip   :  1940  MRN:  [id number]    Admit date:  2020  Discharge date:  10/1/2020    Code Status Order: [facility]   Advance Directives:   [place] Directive Type of Healthcare Directive Copy in [address] Agent's Name Healthcare Agent's Phone Number    20 1608  Yes, patient has an advance directive for healthcare treatment  Health care treatment directive  Yes, copy in chart  Healthcare power of   x wife Pat Graft  [phone]    20 1041  No, patient does not have an advance directive for healthcare treatment  --  --  --  --  --            Admitting Physician:  Ramon Chow DO  PCP: Melida Puentes DO    Discharging Nurse: [address] [place] [address] Unit/Room#: 5K-20/020-A  Discharging Unit Phone Number: [phone]    Emergency Contact:   Extended Emergency Contact Information  Primary Emergency Contact: [facility] Phone: [phone]  Mobile Phone: [phone]  Relation: Spouse   needed? No    Past Surgical History:  Past Surgical History:   Procedure Laterality Date    EYE SURGERY      Cataracts removed    JOINT REPLACEMENT      Hip    LUNG BIOPSY      VASCULAR SURGERY      Stents in leg       Immunization History: There is no immunization history on file for this patient.     Active Problems:  Patient Active Problem List   Diagnosis Code    Bilateral pleural effusion J90    Malignant neoplasm of upper lobe of lung (HCC) C34.10    Acute on chronic respiratory failure with hypoxia (HCC) J96.21    Status post thoracentesis Z98.890    Moderate malnutrition (HCC) E44.0    Mucus plugging of bronchi J98.09    End of life care Z51.5       Isolation/Infection:   Isolation            No Isolation          Patient Infection Status       Infection Onset Added Last Indicated Last Indicated By Review Planned Expiration Resolved Resolved By None active    Resolved    COVID-19 Rule Out 09/16/20 09/16/20 09/16/20 COVID-19 (Ordered)   09/16/20 Rule-Out Test Resulted            Nurse Assessment:  Last Vital Signs: /67   Pulse 77   Temp 97 °F (36.1 °C)   Resp 16   Ht 6' (1.829 m)   Wt 154 lb 3.2 oz (69.9 kg)   SpO2 (!) 86%   BMI 20.91 kg/m²     Last documented pain score (0-10 scale): Pain Level: 0  Last Weight:   Wt Readings from Last 1 Encounters:   09/24/20 154 lb 3.2 oz (69.9 kg)     Mental Status:  oriented and alert    IV Access:  - None    Nursing Mobility/ADLs:  Walking   Assisted  Transfer  Assisted  Bathing  Assisted  Dressing  Assisted  Toileting  Assisted  Feeding  Independent  Med Admin  Assisted  Med Delivery   whole    Wound Care Documentation and Therapy:  Wound 09/17/20 Buttocks Posterior Stage 1 Buttocks (Active)   Wound Pressure Stage  1 09/30/20 1930   Dressing Status Other (Comment) 09/30/20 1930   Dressing/Treatment Protective barrier 09/30/20 1930   Wound Assessment Clean; Intact; Red 09/30/20 1930   Drainage Amount None 09/30/20 1930   Odor None 09/30/20 1930   Radha-wound Assessment Blanchable erythema 09/30/20 1930   Number of days: 13        Elimination:  Continence: Bowel: YES  Bladder: No  Urinary Catheter: Indication for Use of Catheter: Hospice/comfort/palliateive care   Colostomy/Ileostomy/Ileal Conduit: No       Date of Last BM: 09/30/20    Intake/Output Summary (Last 24 hours) at 10/1/2020 1004  Last data filed at 10/1/2020 0528  Gross per 24 hour   Intake 720 ml   Output 600 ml   Net 120 ml     I/O last 3 completed shifts: In: 5 [P.O.:720]  Out: 600 [Urine:600]    Safety Concerns:      At Risk for Falls    Impairments/Disabilities:      None    Nutrition Therapy:  Current Nutrition Therapy:   - Oral Diet:  General    Routes of Feeding: Oral  Liquids: No Restrictions  Daily Fluid Restriction: no  Last Modified Barium Swallow with Video (Video Swallowing Test): not done    Treatments at the Time of Hospital Discharge:   Respiratory Treatments: NONE  Oxygen Therapy:  is on oxygen at 6 L/min per nasal cannula. Ventilator:    - No ventilator support    Rehab Therapies:  NONE  Weight Bearing Status/Restrictions: No weight bearing restirctions  Other Medical Equipment (for information only, NOT a DME order):  walker  Other Treatments: COMFORT CARE/HOSPICE    Patient's personal belongings (please select all that are sent with patient):  None    RN SIGNATURE: Tamera Ballesteros RN    CASE MANAGEMENT/SOCIAL WORK SECTION    Inpatient Status Date: 9/16/2020    Readmission Risk Assessment Score:  Readmission Risk              Risk of Unplanned Readmission:        16           Discharging to Facility/ Agency   Name: [facility]  Address: [address] [address]  Phone: [phone]  Fax: [phone]    Dialysis Facility (if applicable)   Name:  Address:  Dialysis Schedule:  Phone:  Fax:    / signature: Electronically signed by JEREMIAH Simons on 10/1/20 at 10:05 AM EDT    PHYSICIAN SECTION    Prognosis: Poor    Condition at Discharge: Stable    Rehab Potential (if transferring to Rehab): Poor    Recommended Labs or Other Treatments After Discharge: None    Physician Certification: I certify the above information and transfer of Danuta Burton  is necessary for the continuing treatment of the diagnosis listed and that he requires Hospice for less 30 days.      Update Admission H&P: No change in H&P    PHYSICIAN SIGNATURE:  Electronically signed by Earney Severs, MD on 10/1/20 at 11:12 AM EDT

## 2020-10-01 NOTE — PLAN OF CARE
Problem: Falls - Risk of:  Goal: Will remain free from falls  Description: Will remain free from falls  Outcome: Ongoing  Note: No falls noted this shift. Fall risk assessment completed. Hourly rounding performed. Bed locked in lowest position, bed alarm on, call light and personal items within reach, and fall sign posted. Patient has indwelling capone catheter for comfort. Problem: Skin Integrity:  Goal: Will show no infection signs and symptoms  Description: Will show no infection signs and symptoms  Outcome: Ongoing  Note: No new skin lesions noted this shift. Patient encouraged to reposition every two hours. Patient repositioning self every two hours in bed. Skin assessments completed and ongoing. Problem: Respiratory  Goal: O2 Sat > 90%  Outcome: Ongoing  Note: Patient admitted with bilateral plueral effusions and diagnosed with stage 1 lung adenocarcinoma. Patient had pleurx drain removed. On 6L nasal cannula with O2 saturation at 91%. Lung sounds clear and diminished. Will continue to assess. Problem: GI  Goal: No bowel complications  Outcome: Ongoing  Note: Bowel sounds hypoactive. BM noted throughout the night. Only a smear. Problem:   Goal: Adequate urinary output  Outcome: Ongoing  Note: Capone catheter placed for comfort. Problem: Nutrition  Goal: Optimal nutrition therapy  Outcome: Ongoing  Note: Patient on a general diet and tolerating well. Denies any nausea or vomiting. Will continue to assess. Encouragement of intake. Problem: Pain:  Goal: Pain level will decrease  Description: Pain level will decrease  Outcome: Ongoing  Note: Pain Assessment: 0-10  Pain Level: 0  Patient's Stated Pain Goal: No pain   Is pain goal met at this time? Yes     Non-Pharmaceutical Pain Intervention(s): Rest, Repositioned, PRN morphine administered per MAR. Care plan reviewed with patient. Patient verbalize understanding of the plan of care and contribute to goal setting. Electronically signed by Deniz Lan RN on 10/1/2020 at 5:01 AM

## 2020-10-01 NOTE — DISCHARGE SUMMARY
Hospital Medicine Discharge Summary      Patient:  Keven Fermin  YOB: 1940  MRN: [id number]   PCP: Michelle Vergara DO         Acct: [de-identified]     Admit Date: 9/16/2020     Discharge Date:  10/1/2020       Admitting Physician: Eleazar Burden DO  Discharge Physician: Nisha Zimmerman MD     Discharge Diagnoses   DISCHARGE DIAGNOSES:  1. End-of-life care: Patient discharged to [facility] with hospice  2. Acute on chronic hypoxic respiratory failure due to mucous plugging: requiring 6L O2. Steroids added during this admission  3. Bilateral pleural effusions-S/p thoracentesis bilaterally with cytology that did not show malignant cells. 4. Stage I lung adenocarcinoma: Follows with Radiation Oncology  5. Paroxysymal atrial fibrillation  6. COPD  7. Essential hypertension  8. CAD  9. Tobacco dependence    Disposition:    [x] [facility] with hospice           Condition at Discharge: Stable    The patient was seen and examined on day of discharge and this discharge summary is in conjunction with any daily progress note from day of discharge. Hospital course   As derived from the note by Dr. Aliyah Bernard on 10/01/2020  Keven Fermin is a 68 y.o. male current smoker with a previous medical history of Stage I lung adenocarcinoma who presented to [facility] on 9/16/2020 with generalized weakness and progressively worsening shortness of breath.  \" He was recently diagnosed with stage I adenocarcinoma of the lung, and was scheduled to start radiation therapy however was found to have bilateral pleural effusions as outpatient, thus his therapy was canceled and he was to be drained thereafter with radiation therapyon 9/17, however his ex-wife brought him into the ED for further evaluation.   [facility] was started on oxygen in the ER. [facility] had a left thoracentesis on 9/18 as well as a right thoracentesis on 9/17, however he developed progressive hypoxia requiring high flow nasal cannula thus pulmonary was consulted and patient had bronchoscopy on 9/19 which showed a mucous plug in the left mainstem bronchus.  The patient did develop recurrence of the left-sided pleural effusion and ultimately had apigtail catheter placed on 9/22.  The patient was scheduled for a bronchoscopy on 9/23, however after further discussion with pulmonary the patient declined further invasive evaluation and opted for comfort measures.  He was additionally started on steroids for wheezing. Hospice was consulted on 9/23. [facility] remained on high flow nasal cannula but decided he did not wish to continue on this, and he was continued on nasal cannula 6 L\". He is to be discharged to the [facility] with hospice today. Discharge Medications      Sandrita Regan   Home Medication Instructions [facility]:[id number]    Printed on:10/01/20 1102   Medication Information                      lidocaine (LIDODERM) 5 %  Place 1 patch onto the skin daily 12 hours on, 12 hours off.             predniSONE (DELTASONE) 20 MG tablet  Take 2 tablets by mouth daily for 5 days                     Physical Examination     Vitals:  Vitals:    09/30/20 0809 09/30/20 1500 09/30/20 1930 10/01/20 0839   BP: 131/63 122/64 (!) 119/56 134/67   Pulse: 83 80 93 77   Resp: 20 18 20 16   Temp: 96.3 °F (35.7 °C) 97.5 °F (36.4 °C) 98.5 °F (36.9 °C) 97 °F (36.1 °C)   TempSrc: Oral Oral Oral Oral   SpO2: 92% 93% 91% (!) 86%   Weight:       Height:           Weight: Weight: 154 lb 3.2 oz (69.9 kg)     24 hour intake/output:    Intake/Output Summary (Last 24 hours) at 10/1/2020 1106  Last data filed at 10/1/2020 0528  Gross per 24 hour   Intake 520 ml   Output 600 ml   Net -80 ml         General appearance:  No apparent distress. Thin appearing. Poor dentition  HEENT: Normocephalic. Extraocular motion intact. Conjunctivae clear. Nose symmetric without evidence of discharge. Oral mucosa moist w/o erythema or exudate. Neck: Supple. Trachea midline. No thyromegaly. Cardiovascular:  RRR w/ normal S1/S2.   No murmurs, rubs or gallops. Respiratory: Clear to auscultation, bilaterally without rales/wheezes/rhonchi. Abdomen: Soft, non-tender, non-distended with normal bowel sounds. Musculoskeletal:  Generalized weakness. Full ROM without deformity. Neurologic:  No focal sensory/motor deficits. Cranial nerves: II-XII intact. Lymphatic: Deferred   Psychiatric:  Alert and oriented to person- knows that he has lung cancer but states that he does not know why he is sick. Celinaanettdenae Nephew does not make sense at times    Vascular: Dorsalis pedis pulses bilaterally palpable 2+. Radial pulses palpable bilaterally 2+. No peripheral edema. Capillary refill<3 seconds  Genitourinary: Deferred. Skin: No visible rashes or lesions. Labs     Labs: For convenience and continuity at follow-up the following most recent labs are provided:      No results for input(s): WBC, HGB, HCT, PLT in the last 72 hours. No results for input(s): NA, K, CL, CO2, BUN, CREATININE, CALCIUM, PHOS in the last 72 hours. Invalid input(s): MAGNES  No results for input(s): AST, ALT, BILIDIR, BILITOT, ALKPHOS in the last 72 hours. No results for input(s): INR in the last 72 hours. No results for input(s): Yg Snowball in the last 72 hours. Renal:    Lab Results   Component Value Date     09/22/2020    K 4.2 09/22/2020    K 4.6 09/17/2020     09/22/2020    CO2 30 09/22/2020    BUN 21 09/22/2020    CREATININE 0.8 09/22/2020    CALCIUM 8.2 09/22/2020    PHOS 3.4 09/18/2020           Radiology     Radiology:        Ct Abdomen Pelvis Wo Contrast Additional Contrast? None    Result Date: 9/16/2020  PROCEDURE: CT ABDOMEN PELVIS WO CONTRAST CLINICAL INFORMATION: Abdominal pain recently diagnosed with lung cancer. . COMPARISON: No prior study.  TECHNIQUE: 2-D multiplanar noncontrast images of the abdomen and pelvis All CT scans at this facility use dose modulation, iterative reconstruction, and/or weight-based dosing when appropriate to reduce radiation dose to as low as reasonably achievable. FINDINGS: Limitations: Solid organ or hollow viscera evaluation limited without contrast. Lung bases Please see the same-day dedicated exam Abdomen pelvis Hepatomegaly. Distention of the IVC and hepatic veins may indicate right heart failure. Spleen is normal. Fullness of the left adrenal gland could be related to metastatic disease. Right adrenal is not definitively identified. Nonobstructing right intrarenal calculi. Nonobstructing left intrarenal calculi. Gallstones. Pancreas is atrophic. Aorta is atherosclerotic. Pelvis There is ascites within the pelvis. Urinary bladder is unremarkable. There is no bowel obstruction. Degradation of images by right hip replacement. Subcutaneous edema throughout the abdomen and pelvis which is greater on the left suggesting third spacing. No suspicious bone lesions     1. Small amount of ascites in the pelvis 2. Hepatomegaly 3. Cholelithiasis 4. Bilateral nonobstructive nephrolithiasis. **This report has been created using voice recognition software. It may contain minor errors which are inherent in voice recognition technology. ** Final report electronically signed by Dr. Inderjit Drake on 9/16/2020 11:16 PM    Ct Chest Wo Contrast    Result Date: 9/16/2020  PROCEDURE: CT CHEST WO CONTRAST CLINICAL INFORMATION: Hypoxia shortness of breath, recently diagnosed with bilateral pleural effusions and lung cancer. . COMPARISON: No prior study. TECHNIQUE: 2-D multiplanar noncontrast images of the chest All CT scans at this facility use dose modulation, iterative reconstruction, and/or weight-based dosing when appropriate to reduce radiation dose to as low as reasonably achievable. FINDINGS: Large bilateral pleural effusions left greater than right. Left lower lobe compressive atelectasis or consolidation. Left upper lobe consolidation at the major fissure. Centrilobular emphysematous changes.  Spiculated nodular density posterior right upper lung image 18 series 2 measuring 10 mm. Nodule versus focal atelectasis posterior right upper lung 6 mm. Image 31 Left hilar masslike consolidation. A shallow adenopathy with a single 10 mm short axis pretracheal node and a 1.3 cm subcarinal node. Aorta is moderately atherosclerotic with calcific atherosclerosis. Heart size is enlarged. Minimal apical pericardial effusion 1 cm. No suspicious bone lesions. ABDOMEN: Please see the same-day dedicated exam     Bilateral pleural effusions. Left upper and lower lobe consolidations. Nodular densities right lung. Cardiomegaly with small pericardial effusion. **This report has been created using voice recognition software. It may contain minor errors which are inherent in voice recognition technology. ** Final report electronically signed by Dr. Momo Stovall on 9/16/2020 11:13 PM    Xr Chest Portable    Result Date: 9/24/2020  XR CHEST PORTABLE Portable AP chest.     Exam Date and Exam Time: 09/24/2020 03:35 AM Accession: [id number] Reason for exam: left pleural effusion w/left pigtail Ordering Diagnosis: Pleural effusion Impression: 9/23/20. Findings: Marked improved aeration left lung with patchy ill-defined opacities in the left mid and lower lung zones. Considering the slight differences in technique and positioning the overall appearance of the right upper lobe ill-defined consolidation has mildly increased particularly in the lower distribution. Ill-defined opacity in the right lower lung zone is relatively unchanged. Cardiomegaly. Left thoracostomy tube terminates within the left infrahilar region. IMPRESSION: Marked reduction of left pleural effusion with improved aeration left lung. Mild increased ill-defined opacification in the right upper lobe most consolidated at the lower distribution. Bibasilar ill-defined opacities favoring layering pleural effusions with atelectasis and/or pneumonia.  This document has been electronically signed by: [facility]. Zenaida Agudelo on 09/24/2020 07:17 AM     Xr Chest Portable    Result Date: 9/23/2020  XR CHEST PORTABLE Exam Date and Exam Time: 09/23/2020 02:05 AM Accession: [id number] Reason for exam: bilateral pleural effusions L Ordering Diagnosis: Pleural effusion 1 view chest x-ray Comparison:  CR,SR  - XR CHEST PORTABLE  - 09/22/2020 02:21 AM EDT Findings: There is persistent opacification of the left hemithorax with underlying left pleural catheter. Prominent vessels seen in the right upper lobe with underlying interstitial infiltrates not excluded. No pneumothorax. Cardiac silhouette is obscured. No acute fracture. Left pleural catheter with persistent opacification of the left hemithorax. No pneumothorax. This document has been electronically signed by: Catia Bach MD on 09/23/2020 02:58 AM     Xr Chest Portable    Result Date: 9/22/2020  PROCEDURE: XR CHEST PORTABLE 1514 hours CLINICAL INFORMATION: chest tube. COMPARISON: 9/22/2020 1041 hours TECHNIQUE: AP upright view of the chest. FINDINGS: Left chest tube inferiorly appears unchanged. A pleural reflection may persist at the left lung apex though this is uncertain. There is a pleural cap of fluid in this region again noted which limits this distinction assessment. Certainly there is no enlarging pneumothorax. Moderate right upper lobe airspace opacity again noted. There is mild right infrahilar parenchymal opacity which appears worsened. Moderate left perihilar parenchymal opacity at the bases slightly worsened. This is suspicious for pulmonary edema. It is unclear whether the small left apical pneumothorax persists, though it certainly has not enlarged since previous. **This report has been created using voice recognition software. It may contain minor errors which are inherent in voice recognition technology. ** Final report electronically signed by Dr. Chrystal Agudelo on 9/22/2020 3:47 PM    Xr Chest Portable    Result Date: 9/22/2020  PROCEDURE: XR CHEST PORTABLE CLINICAL INFORMATION: s/p left pigtail placement- IR at bedside. COMPARISON: 9/22/2020 TECHNIQUE: AP upright view of the chest. FINDINGS: A left pigtail chest tube has been placed inferiorly. Moderately improved aeration of the left upper lobe. Pleural fluid is moderately improved. A small left apical pneumothorax is suspected, measuring up to 14 mm. Moderate right upper lobe parenchymal opacity bordering the fissure continues to worsen. There is moderate prominence of the lung markings. Mild cardiomegaly is stable. A small left pneumothorax is seen. A short-term follow-up chest x-ray will be performed. **This report has been created using voice recognition software. It may contain minor errors which are inherent in voice recognition technology. ** Final report electronically signed by Dr. Jose Hamilton on 9/22/2020 11:02 AM    Xr Chest Portable    Result Date: 9/22/2020  PROCEDURE: XR CHEST PORTABLE CLINICAL INFORMATION: Left-sided opacification TECHNIQUE: Mobile AP chest radiograph. COMPARISON: Mobile AP chest radiograph 9/21/2020 FINDINGS: There is almost complete opacification of the left hemithorax with minimal aeration of the upper lung. Cardiac silhouette remains secured. There are worsening alveolar and reticular opacities throughout the right lung. The right costophrenic angle is blunted. Degenerative changes in the thoracic spine are poorly visualized. 1. Almost complete opacification of the left hemithorax, likely secondary to large pleural effusion. 2. Small right-sided pleural effusion. 3. Extensive right lung atelectasis/infiltrate. **This report has been created using voice recognition software. It may contain minor errors which are inherent in voice recognition technology. ** Final report electronically signed by Dr. Elen Galdamez on 9/22/2020 8:26 AM    Xr Chest Portable    Result Date: 9/21/2020  PROCEDURE: XR CHEST PORTABLE CLINICAL INFORMATION: 37-year-old male with increased oxygen demands. COMPARISON: Chest x-ray 9/20/2020. TECHNIQUE: AP upright view of the chest was obtained. FINDINGS: There is complete opacification of the left hemithorax. This could be due to a large pleural effusion. The patient has been extubated. The nasogastric tube is been removed. There is persistent pulmonary vascular congestion. There are infiltrates in the right upper lung. The heart is obscured. There is no pneumothorax. Visualized portions of the upper abdomen are within normal limits. The osseous structures are intact. No acute fractures or suspicious osseous lesions. There is complete opacification of the left hemithorax which may be due to a large pleural effusion. Appearance of the chest is worse when compared to the previous exam. **This report has been created using voice recognition software. It may contain minor errors which are inherent in voice recognition technology. ** Final report electronically signed by Dr Clemens Sandhoff on 9/21/2020 6:20 AM    Xr Chest Portable    Result Date: 9/20/2020  PROCEDURE: XR CHEST PORTABLE CLINICAL INFORMATION: 72-year-old male with pleural effusion. COMPARISON: Chest x-ray 9/19/2020 6:27 PM TECHNIQUE: AP semiupright view of the chest was obtained. FINDINGS: An endotracheal tube is 5.8 cm from the malaika. A nasogastric tube is seen coursing below the level of the diaphragm without visualization of its distal tip. There is persistent pulmonary vascular congestion which has slightly improved since the prior exam. There is blunting of both costophrenic angles which may represent small pleural effusions. Visualized portions of the upper abdomen are within normal limits. The osseous structures are intact. No acute fractures or suspicious osseous lesions. Cardiomegaly and pulmonary vascular congestion with small bilateral pleural effusions. The appearance of the chest has slightly improved when compared to the previous study.  **This report has been created using voice recognition software. It may contain minor errors which are inherent in voice recognition technology. ** Final report electronically signed by Dr Clemens Sandhoff on 9/20/2020 2:28 AM    Xr Chest Portable    Result Date: 9/19/2020  PROCEDURE: XR CHEST PORTABLE CLINICAL INFORMATION: ET and NG tube placement, s/p Bronch COMPARISON: Earlier examination from same day. TECHNIQUE:  AP mobile chest single view  FINDINGS: There is an endotracheal tube present with the tip overlying the trachea approximately 7.1 cm above the malaika. There is improved aeration of the left lung compared to prior examination from earlier same day. Bilateral pulmonary edema is demonstrated. Hazy opacity at the right lung base is demonstrated and may represent mild no definite pneumothorax is seen. There is partial exclusion of the lateral left chest. Underlying pleural fluid. 1. Significantly improved aeration of the left lung. However there is partial exclusion of the left lateral thorax and examination. 2. Interval placement of endotracheal tube with tip overlying the trachea approximately 7.1 cm above the malaika. There are 3. Diffuse bilateral interstitial opacities are present overlying the visualized lung. This likely represents underlying pulmonary edema. 4. Mild hazy opacity at the right lung base may indicate small underlying right-sided pleural fluid. **This report has been created using voice recognition software. It may contain minor errors which are inherent in voice recognition technology. ** Final report electronically signed by Dr. Josephine Bay on 9/19/2020 6:55 PM    Xr Chest Portable    Result Date: 9/19/2020  PROCEDURE: XR CHEST PORTABLE CLINICAL INFORMATION: pleural effusions . COMPARISON: No prior study. TECHNIQUE: Portable upright FINDINGS: Worsening appearance of the chest with complete opacification of the left hemithorax. Likely additional volume loss. Right lung demonstrates vascular congestion.  No effusion. Worsening appearance of the chest with now complete opacification of the left lung Mucous plugging be a consideration. **This report has been created using voice recognition software. It may contain minor errors which are inherent in voice recognition technology. ** Final report electronically signed by Dr. Inderjit Drake on 9/19/2020 3:14 AM    Xr Chest Portable    Result Date: 9/18/2020  PROCEDURE: XR CHEST PORTABLE CLINICAL INFORMATION: Desaturation. Status post thoracentesis. COMPARISON: Earlier film same date, 1255 hours. TECHNIQUE: A single mobile view of the chest was obtained. 1. Mild to moderate cardiomegaly. Moderate-sized pleural effusion left side, slightly improved from earlier. Tiny effusion right side. No pneumothorax seen, however. 2. Moderate pneumonia/pulmonary edema scattered diffusely in the right lung and likely diffusely in the left lung is well. 3. Overall appearance of chest not significantly changed from earlier with the exception of slightly small pleural effusion left side. **This report has been created using voice recognition software. It may contain minor errors which are inherent in voice recognition technology. ** Final report electronically signed by Dr. Karl Reyes on 9/18/2020 2:52 PM    Xr Chest Portable    Result Date: 9/16/2020  PROCEDURE: XR CHEST PORTABLE CLINICAL INFORMATION: Fatigue cough shortness of breath . COMPARISON: 8/25/2020 TECHNIQUE: Portable upright FINDINGS: COMPLETE opacification of the left lower lobe airspace consolidation and pleural fluid. Cardiomegaly. Pulmonary vascular congestion. Haziness right lower lobe probably posterior pleural fluid. EKG leads overlie the chest.     Congestive failure with complete opacification of the left lower lobe likely due to combination of consolidation and pleural fluid. Probable posteriorly layered right effusion versus developing infiltrate **This report has been created using voice recognition software. It may contain minor errors which are inherent in voice recognition technology. ** Final report electronically signed by Dr. Sera Rahman on 9/16/2020 10:33 PM    Xr Chest 1 View    Result Date: 9/18/2020  PROCEDURE: XR CHEST 1 VIEW CLINICAL INFORMATION: s/p left thoracentesis. COMPARISON: 9/17/2020 TECHNIQUE: AP upright view of the chest. FINDINGS: There is only improved aeration of the left lung, though a large left pleural effusion persists. No pneumothorax is seen. Moderate congestion is suggested within the right lung. There are mild airspace opacities within the right mid lung which could relate to pulmonary edema. No pneumothorax post left thoracentesis. **This report has been created using voice recognition software. It may contain minor errors which are inherent in voice recognition technology. ** Final report electronically signed by Dr. Renée Ta on 9/18/2020 1:51 PM    Vl Dup Lower Extremity Venous Bilateral    Result Date: 9/21/2020  PROCEDURE: Bilateral lower extremity venous duplex examination CLINICAL INFORMATION: eval for clots, patient on high flow oxygen, unable to transport COMPARISON: No prior study. TECHNIQUE: Grayscale, color-flow, and spectral waveform ultrasound images were obtained through the bilateral lower extremity venous structures. Augmentation and compression maneuvers were performed at multiple levels. FINDINGS: RIGHT SIDE: The common femoral vein demonstrates normal flow, augmentation and compressibility. The saphenofemoral junction appears patent and compressible. The greater saphenous vein demonstrates normal flow proximally. The superficial femoral and popliteal veins demonstrate normal flow, compressibility and augmentation. The deep veins of the calf appear patent including the gastrocnemius, posterior tibial, peroneal and anterior tibial veins. LEFT SIDE: The common femoral vein demonstrates normal flow, augmentation and compressibility.  The saphenofemoral junction appears patent and compressible. The greater saphenous vein demonstrates normal flow proximally. The superficial femoral and popliteal veins demonstrate normal flow, compressibility and augmentation. The deep veins of the calf appear patent including the gastrocnemius, posterior tibial, peroneal and anterior tibial veins. 1. No sonographic evidence of lower extremity DVT. **This report has been created using voice recognition software. It may contain minor errors which are inherent in voice recognition technology. ** Final report electronically signed by Dr. Gricelda Dias on 9/21/2020 2:08 PM    Us Thoracentesis    Result Date: 9/21/2020  PROCEDURE: US THORACENTESIS CLINICAL INFORMATION: left side pleural effusion r/o need for thoracentesis . COMPARISON: Chest x-ray 9/21/2020 PROCEDURE: The benefits and the risk of the procedure were explained to the patient. The patient was given an opportunity to ask questions. Signed consent was obtained. Limited ultrasound exam of the left chest showed  large  amount of pleural fluid. Using ultrasound guidance, a site was marked on the skin. The left side of the posterior chest was prepped and draped using the usual sterile technique and the skin was anesthetized with 2 percent lidocaine. A 5 Serbian one-step catheter was introduced to the left pleural space. 1.4 L of clear yellow fluid drained. The catheter was then removed. The patient tolerated the procedure well with no complications. Specimen sent for laboratory studies as requested. Estimated blood loss is 0 cc. Patient left the department in good condition. Successful ultrasound-guided thoracentesis with  1.4 L of fluid drained. **This report has been created using voice recognition software. It may contain minor errors which are inherent in voice recognition technology. ** Final report electronically signed by Dr. Mel Valerio on 9/21/2020 12:33 PM    Us Thoracentesis    Result Date: 9/18/2020  PROCEDURE: [address] THORACENTESIS CLINICAL INFORMATION: Left lung large pleural effusion . COMPARISON: 9/17/2020 PROCEDURE: The benefits and the risk of the procedure were explained to the patient. The patient was given an opportunity to ask questions. Signed consent was obtained. Limited ultrasound exam of the left chest showed  large  amount of pleural fluid. Using ultrasound guidance, a site was marked on the skin. The left side of the posterior chest was prepped and draped using the usual sterile technique and the skin was anesthetized with 2 percent lidocaine. A 5 Cameroonian one-step catheter was introduced to the left pleural space. 1.4 L of clear yellow fluid drained. Patient complained of pain and the procedure was terminated. The catheter was then removed. The patient tolerated the procedure well with no complications. Specimen sent for laboratory studies as requested. Estimated blood loss is 0 cc. Patient left the department in good condition. Successful ultrasound-guided thoracentesis with  1.4 L of fluid drained. Patient was specifically informed that his large left pleural effusion would likely have to be drained in two procedures. **This report has been created using voice recognition software. It may contain minor errors which are inherent in voice recognition technology. ** Final report electronically signed by Dr. Tyrel Arellano on 9/18/2020 3:50 PM    Us Thoracentesis    Result Date: 9/17/2020  PROCEDURE: US THORACENTESIS CLINICAL INFORMATION: Right Lung. Pt with pleural effusion with lung cancer, need assistance with tap. Thank you! . COMPARISON: CT 9/16/2020 PROCEDURE: Clinical team was contacted and they specifically stated that he wanted the right sided procedure to precede the left. The benefits and the risk of the procedure were explained to the patient. The patient was given an opportunity to ask questions. Signed consent was obtained.  Limited ultrasound exam of the right chest showed moderate amount of pleural fluid. Using ultrasound guidance, a site was marked on the skin. The right side of the posterior chest was prepped and draped using the usual sterile technique and the skin was anesthetized with 2 percent lidocaine. A 5 Greenlandic one-step catheter was introduced to the right pleural space. 0.92 L of clear yellow fluid drained. The catheter was then removed. The patient tolerated the procedure well with no complications. Specimen sent for laboratory studies as requested. Estimated blood loss is 0 cc. Patient left the department in good condition. Successful ultrasound-guided thoracentesis with  0.92 L of fluid drained. **This report has been created using voice recognition software. It may contain minor errors which are inherent in voice recognition technology. ** Final report electronically signed by Dr. Yobany Singh on 9/17/2020 4:32 PM    Us Thoracentesis With Tube    Result Date: 9/22/2020  PROCEDURE: US THORACENTESIS WITH TUBE CLINICAL INFORMATION: pigtail placement for reoccurring left pleural effusion . COMPARISON: Chest x-ray 9/22/2020 PROCEDURE: The benefits and the risk of the procedure were explained to the patient. The patient was given an opportunity to ask questions. Signed consent was obtained. Limited ultrasound exam of the left chest showed  large  amount of pleural fluid. It was difficult to find an access point without intervening lung. Using ultrasound guidance, a site was marked on the skin. The left side of the posterior chest was prepped and draped using the usual sterile technique and the skin was anesthetized with 2 percent lidocaine. A 5 Greenlandic one-step catheter was introduced to the left pleural space. 0.55 L of bloody fluid drained. The catheter was then removed. The patient tolerated the procedure well with no complications. Specimen sent for laboratory studies as requested. Estimated blood loss is 0 cc. Patient left the department in good condition.      Successful ultrasound-guided thoracentesis with  0.55 L of fluid drained. **This report has been created using voice recognition software. It may contain minor errors which are inherent in voice recognition technology. ** Final report electronically signed by Dr. Chucky Gutierrez on 9/22/2020 11:25 AM    Xr Abdomen For Ng/og/ne Tube Placement    Result Date: 9/19/2020  PROCEDURE: XR ABDOMEN FOR NG/OG/NE TUBE PLACEMENT CLINICAL INFORMATION: NG placement COMPARISON: No prior study. TECHNIQUE:  AP supine abdomen  FINDINGS: There is an enteric tube present coursing below the diaphragm, the distal portions of which are excluded from the current examination. However the distal side-port projects over the gastric body. The visualized lung bases appear significantly improved in aeration compared to prior examination. There is partial exclusion of the right costophrenic angle. 1. There is an enteric tube present coursing below the diaphragm, the distal portions of which are excluded from the current examination. However the distal side-port projects over the gastric body. **This report has been created using voice recognition software. It may contain minor errors which are inherent in voice recognition technology. ** Final report electronically signed by Dr. Star Stephenson on 9/19/2020 6:56 PM    Xr Chest Pa Inspiration 1 Vw    Result Date: 9/21/2020  PROCEDURE: XR CHEST PA INSPIRATION 1 VW CLINICAL INFORMATION: left thoracentesis. COMPARISON: 9/21/2020 TECHNIQUE: AP upright view of the chest. FINDINGS: Moderately improved aeration of the left lung superiorly. A small left pleural effusion likely persists though is significantly improved. No pneumothorax. Moderate congestion. Mild right upper lobe airspace opacity is slightly improved. This favors pulmonary edema. No pneumothorax post left thoracentesis. **This report has been created using voice recognition software.  It may contain minor errors which are inherent in voice recognition technology. ** Final report electronically signed by Dr. Suha Dawson on 9/21/2020 1:50 PM    Xr Chest Pa Inspiration 1 Vw    Result Date: 9/17/2020  PROCEDURE: XR CHEST PA INSPIRATION 1 VW CLINICAL INFORMATION: post right thoracentesis. COMPARISON: 9/16/2020 TECHNIQUE: AP upright view of the chest. FINDINGS: Right pleural fluid has been evacuated. No pneumothorax. There is complete white out of the left hemithorax, significantly worsened from previous. There is favored to relate to pleural fluid as there is mild mediastinal shift toward the right, as atelectasis or shift toward the left. No pneumothorax post right thoracentesis. Note should be made that internal medicine was contacted and stated they definitely warranted a right thoracentesis to proceed a left thoracentesis. **This report has been created using voice recognition software. It may contain minor errors which are inherent in voice recognition technology. ** Final report electronically signed by Dr. Suha Dawson on 9/17/2020 4:16 PM        Consults     IP CONSULT TO DIETITIAN  IP CONSULT TO SOCIAL WORK  IP CONSULT TO PULMONOLOGY  PALLIATIVE CARE EVAL  IP CONSULT TO DIETITIAN  PHARMACY TO DOSE MEDICATION  IP CONSULT TO SOCIAL WORK  IP CONSULT TO SPIRITUAL SERVICES  IP CONSULT TO SPIRITUAL SERVICES  IP CONSULT TO HOSPICE  IP CONSULT TO HOSPICE  IP CONSULT TO [place]          Discharge Instructions     Patient Instructions:    Discharge lab work: None  Activity: As tolerated  Diet: DIET GENERAL;  Dietary Nutrition Supplements: Standard High Calorie Oral Supplement      Follow-up visits     DO Linda Rodrigues 40. [address]  [address] [place] [id number]      have CBC, BMP, and chest x-ray done 3-5 days prior         Code status at time of discharge     [id number]       Time Spent on discharge is more than 51 minutes in the examination, evaluation, counseling and review of medications and discharge plan. Signed: Thank you Corby Almaraz DO for the opportunity to be involved in this patient's care.     Electronically signed by [place] MD on 10/1/2020 at 11:06 AM